# Patient Record
Sex: FEMALE | Race: WHITE | NOT HISPANIC OR LATINO | Employment: FULL TIME | URBAN - NONMETROPOLITAN AREA
[De-identification: names, ages, dates, MRNs, and addresses within clinical notes are randomized per-mention and may not be internally consistent; named-entity substitution may affect disease eponyms.]

---

## 2017-01-01 ENCOUNTER — HOSPITAL ENCOUNTER (OUTPATIENT)
Dept: URGENT CARE | Facility: CLINIC | Age: 57
Discharge: HOME | End: 2017-01-01
Attending: NURSE PRACTITIONER | Admitting: NURSE PRACTITIONER

## 2017-01-01 ENCOUNTER — HOSPITAL ENCOUNTER (OUTPATIENT)
Dept: URGENT CARE | Facility: CLINIC | Age: 57
Discharge: HOME | End: 2017-01-01
Attending: FAMILY MEDICINE | Admitting: FAMILY MEDICINE

## 2017-01-05 ENCOUNTER — OFFICE VISIT (OUTPATIENT)
Dept: FAMILY MEDICINE CLINIC | Facility: CLINIC | Age: 57
End: 2017-01-05

## 2017-01-05 VITALS
HEIGHT: 61 IN | SYSTOLIC BLOOD PRESSURE: 110 MMHG | WEIGHT: 224 LBS | HEART RATE: 57 BPM | DIASTOLIC BLOOD PRESSURE: 62 MMHG | OXYGEN SATURATION: 98 % | BODY MASS INDEX: 42.29 KG/M2

## 2017-01-05 DIAGNOSIS — Z98.84 H/O GASTRIC BYPASS: Primary | ICD-10-CM

## 2017-01-05 DIAGNOSIS — K59.00 CONSTIPATION, UNSPECIFIED CONSTIPATION TYPE: ICD-10-CM

## 2017-01-05 DIAGNOSIS — R10.84 GENERALIZED ABDOMINAL PAIN: ICD-10-CM

## 2017-01-05 LAB
25(OH)D2 SERPL-MCNC: 38 NG/ML (ref 30–100)
ALBUMIN SERPL-MCNC: 3.8 GM/DL (ref 3.4–4.8)
ALP SERPL-CCNC: 72 U/L (ref 38–126)
ALT SERPL-CCNC: 44 U/L (ref 9–52)
ANION GAP SERPL CALCULATED.3IONS-SCNC: 8 MMOL/L (ref 5–15)
AST SERPL-CCNC: 39 U/L (ref 14–36)
BASOPHILS # BLD AUTO: 0.02 X1000/UL (ref 0–0.2)
BASOPHILS NFR BLD AUTO: 0.3 % (ref 0–2)
BILIRUB SERPL-MCNC: 0.7 MG/DL (ref 0.2–1.3)
BUN SERPL-MCNC: 17 MG/DL (ref 7–21)
CALCIUM SERPL-MCNC: 9.4 MG/DL (ref 8.4–10.2)
CHLORIDE SERPL-SCNC: 104 MMOL/L (ref 95–110)
CO2 SERPL-SCNC: 29 MMOL/L (ref 22–31)
CONV AUTO IG#: 0.01 X1000/UL (ref 0.01–0.02)
CONV AUTO IG%: 0.1 % (ref 0–0.5)
CREAT SERPL-MCNC: 0.6 MG/DL (ref 0.5–1)
EOSINOPHIL # BLD AUTO: 0.17 X1000/UL (ref 0–0.7)
EOSINOPHIL NFR BLD AUTO: 2.4 % (ref 0–7)
ERYTHROCYTE [DISTWIDTH] IN BLOOD: 15 % (ref 11.5–14.5)
GLUCOSE SERPL-MCNC: 125 MG/DL (ref 60–100)
GRANULOCYTES # BLD AUTO: 4.09 X1000/UL (ref 2–8.6)
GRANULOCYTES NFR BLD AUTO: 58.3 % (ref 37–80)
HCT VFR BLD CALC: 43.4 % (ref 35–45)
HGB BLD-MCNC: 14.4 GM/DL (ref 12–15.5)
IRON SATN MFR SERPL: 16.5 % (ref 15–50)
IRON SERPL-MCNC: 49 UG/DL (ref 37–170)
LYMPHOCYTES # BLD AUTO: 2.29 X1000/UL (ref 0.6–4.2)
LYMPHOCYTES NFR BLD AUTO: 32.6 % (ref 10–50)
MCH RBC QN: 29.6 PG (ref 26–34)
MCHC RBC-ENTMCNC: 33.2 GM/DL (ref 31.4–36)
MCV RBC: 89.3 FL (ref 80–98)
MONOCYTES # BLD AUTO: 0.44 X1000/UL (ref 0–0.9)
MONOCYTES NFR BLD AUTO: 6.3 % (ref 0–12)
PLATELET # BLD: 264 X1000/MM3 (ref 150–450)
PMV BLD: 10.7 FL (ref 8–12)
POTASSIUM SERPL-SCNC: 3.9 MMOL/L (ref 3.5–5.1)
PROT SERPL-MCNC: 7.2 GM/DL (ref 6.3–8.6)
RBC # BLD: 4.86 MEGA/MM3 (ref 3.77–5.16)
SODIUM SERPL-SCNC: 141 MMOL/L (ref 137–145)
T4 FREE SERPL-MCNC: 0.93 NG/DL (ref 0.78–2.19)
TIBC SERPL-MCNC: 297 UG/DL (ref 265–497)
VIT B12 SERPL-MCNC: 668 PG/ML (ref 239–931)
WBC # BLD: 7 X1000/UL (ref 3.2–9.8)

## 2017-01-05 PROCEDURE — 99214 OFFICE O/P EST MOD 30 MIN: CPT | Performed by: FAMILY MEDICINE

## 2017-01-05 RX ORDER — SENNA AND DOCUSATE SODIUM 50; 8.6 MG/1; MG/1
2 TABLET, FILM COATED ORAL DAILY
Qty: 60 TABLET | Refills: 5 | Status: SHIPPED | OUTPATIENT
Start: 2017-01-05 | End: 2017-08-07

## 2017-01-05 NOTE — MR AVS SNAPSHOT
Kimberly Means   1/5/2017 10:00 AM   Office Visit    Dept Phone:  [phone]   Encounter #:  [id number]    Provider:  Jessi Iverson MD   Department:  [facility] FAMILY MEDICINE                Your Full Care Plan              Today's Medication Changes          These changes are accurate as of: 1/5/17  1:18 PM.  If you have any questions, ask your nurse or doctor.               New Medication(s)Ordered:     sennosides-docusate sodium 8.6-50 MG tablet   Commonly known as:  SENOKOT-S   Take 2 tablets by mouth Daily.   Started by:  Jessi Iverson MD            Where to Get Your Medications      These medications were sent to [facility] [address] [place] [address] AT [facility] - [phone]  - [phone] [facility] [address]     Phone:  [phone]     sennosides-docusate sodium 8.6-50 MG tablet                  Your Updated Medication List          This list is accurate as of: 1/5/17  1:18 PM.  Always use your most recent med list.                albuterol 108 (90 BASE) MCG/ACT inhaler   Commonly known as:  PROVENTIL HFA;VENTOLIN HFA       ALPRAZolam 0.5 MG tablet   Commonly known as:  XANAX       aspirin 81 MG tablet       butalbital-aspirin-caffeine -40 MG capsule   Commonly known as:  FIORINAL       clopidogrel 75 MG tablet   Commonly known as:  PLAVIX   Take 1 tablet by mouth Every Night.       cyanocobalamin 1000 MCG/ML injection       escitalopram 10 MG tablet   Commonly known as:  LEXAPRO       esomeprazole 40 MG capsule   Commonly known as:  NEXIUM   Take 1 capsule by mouth every morning before breakfast.       ezetimibe-simvastatin 10-40 MG per tablet   Commonly known as:  VYTORIN   Take 1 tablet by mouth Every Night.       fluticasone 50 MCG/ACT nasal spray   Commonly known as:  FLONASE       losartan 100 MG tablet   Commonly known as:  COZAAR       metoprolol succinate XL 50 MG 24 hr  tablet   Commonly known as:  TOPROL-XL   Take 1 tablet by mouth Daily.       promethazine 12.5 MG tablet   Commonly known as:  PHENERGAN       sennosides-docusate sodium 8.6-50 MG tablet   Commonly known as:  SENOKOT-S   Take 2 tablets by mouth Daily.       vitamin D 80718 UNITS capsule capsule   Commonly known as:  ERGOCALCIFEROL               We Performed the Following     CBC & Differential     Comprehensive Metabolic Panel     Iron Profile     T4, Free     TSH     Vitamin A     Vitamin B12     Vitamin D 25 Hydroxy     Vitamin E     Vitamin K1       You Were Diagnosed With        Codes Comments    H/O gastric bypass    -  Primary ICD-10-CM: Z98.890  ICD-9-CM: V45.86     Generalized abdominal pain     ICD-10-CM: R10.84  ICD-9-CM: 789.07     Constipation, unspecified constipation type     ICD-10-CM: K59.00  ICD-9-CM: 564.00       Instructions     None    Patient Instructions History      Upcoming Appointments     Visit Type Date Time Department    OFFICE VISIT 1/5/2017 10:00 AM [facility]    OFFICE VISIT 1/16/2017  9:45 AM [facility] F[facility]ULTY [facility]    OFFICE VISIT 4/27/2017  3:15 PM [facility] HEART CARE [facility]      MyChart Signup     Our records indicate that you have declined [facility] [employer] signup. If you would like to sign up for [employer], please email [email or web address] or call [phone] to obtain an activation code.             Other Info from Your Visit           Your Appointments     Jan 16, 2017  9:45 AM CST   Office Visit with Jessi Iverson MD   [facility] FAMILY MEDICINE (--)    [address]   [facility] [address] [phone]           Arrive 15 minutes prior to appointment.            Apr 27, 2017  3:15 PM CDT   Office Visit with Nallely Foster MD   [facility] CARDIOLOGY (--)    [address]  [facility] [address] [phone]           Arrive 15 minutes prior to appointment.              Allergies     Kiwi Extract   "Anaphylaxis    Dilaudid [Hydromorphone]  Other (See Comments)    Rapid heart rate    Latex      Lortab [Hydrocodone-acetaminophen]  Confusion, Hallucinations    Morphine And Related  Itching    Paxil [Paroxetine]      Sulfa Antibiotics  Hives      Reason for Visit     Weight Check           Vital Signs     Blood Pressure Pulse Height Weight Oxygen Saturation Body Mass Index    110/62 (BP Location: Right arm, Patient Position: Sitting, Cuff Size: Adult) 57 61\" (154.9 cm) 224 lb (102 kg) 98% 42.32 kg/m2    Smoking Status                   Never Smoker           Problems and Diagnoses Noted     Abdominal pain    History of gastric bypass    -  Primary    Constipation            "

## 2017-01-05 NOTE — PROGRESS NOTES
Subjective   Kimberly Means is a 56 y.o. female who presents to the clinic for an ER follow up. She was recently seen in the ER for abdominal pain.  She had a CT scan that was abnormal showing  DATE OF PROCEDURE- 1/2/2017 7-00 AM CST CT OF THE ABDOMEN AND PELVIS WITH IV CONTRAST INDICATION FOR PROCEDURE- 56 years -old patient presents for evaluation abdominal pain . TECHNIQUE- Contiguous axial images were obtained from lung bases to the proximal thighs after intravenous administration of 100 mm of Isovue-300 contrast. Oral contrast is not administered. Multiplanar reformations are submitted for interpretation. Dose length product is . Images were acquired in accordance with the principles of ALARA (as low as reasonably allowable). COMPARISON- CT the abdomen and pelvis dated May 3, 2016. FINDINGS- What is seen of the chest wall has a normal appearance. What is seen of the heart has a normal unenhanced appearance without obvious pericardial effusion. Lung bases are clear. No pleural effusions are visible. The liver has decreased attenuation without obvious mass or dilated intrahepatic biliary ducts. The gallbladder is not present with surgical clips in the gallbladder fossa probably secondary to cholecystectomy.. The spleen has a normal appearance with calcified granulomas. Both adrenal glands are within normal limits. The pancreas has a normal appearance. Both kidneys have a normal appearance without obvious perinephric fluid or hydronephrosis. There is no evidence for hydroureter or radiopaque ureteral calculi. The urinary bladder has a normal appearance. The uterus is not visualized suggesting patient may have had a hysterectomy. The distal esophagus has a normal appearance. Surgical sutures are visible in the stomach suggesting sequela of gastric bypass surgery. There is no obvious dilated bowel, ascites or pneumoperitoneum. The small bowel has a normal appearance. The descending colon is not distended which  gives appearance of mild thickening of the walls. There is also mild thickening of the walls of sigmoid colon.. There is a multifocal abnormal thickening of the walls of the ascending colon, new since the previous study. This could represent the presence of polyps and/or annular mass. Scattered colonic diverticula are visualized. No appendix is visualized. Abdominal aorta has a normal tapered appearance. The inferior vena cava has a normal appearance. Abdominal wall has moderately large ventral hernia containing bowel and fat in the central abdominal wall. There is multilevel thoracic spondylosis. Intervertebral disc spaces are within normal limits. Bony pelvis has a normal CT appearance. CONCLUSION- 1. No CT evidence of acute intra-abdominal disease. 2. Status post gastric surgery. 3. Cholecystectomy. 4. Nonspecific thickening of the walls of the distal descending colon and sigmoid colon. This maybe the result of acute or chronic inflammation. 5. Nonspecific abnormal thickening of the walls of the ascending colon. This maybe the result of a neoplastic process. 6. Large ventral hernia containing bowel and fat. 7. Hepatic steatosis.      She has been having problems with constipation.  She states Dr. Villegas did her colonoscopy a year ago.  Her weight is down 40 pounds after having gastric sleeve 11/8/2016.  She wasn't able to eat over the weekend due to abdominal pain.  She ate 1/2 cup of oats this am.  She is feeling better.  She needs labs due to her recent surgery.    History of Present Illness     The following portions of the patient's history were reviewed and updated as appropriate:   She  has a past medical history of Abdominal pain; Acute bronchitis; Acute left otitis media; Acute otitis externa; Allergic rhinitis; Allergy to shrimp; Ankle pain; Asthma; Astigmatism; Bacterial pneumonia (12/02/2015); Contact dermatitis; Coronary arteriosclerosis; Cough; Depressive disorder; Diarrhea; Dysuria; Encounter for  Papanicolaou smear of cervix (06/01/2009); Epigastric pain; Epistaxis; Essential hypertension; Fatigue; Fever; Functional diarrhea; Gastroenteritis; Generalized anxiety disorder (01/2015); Heel pain; Hematochezia; Hernia of abdominal wall (05/13/2016); History of bone density study (05/27/2014); History of colonoscopy; History of esophagogastroduodenoscopy; History of mammogram (06/29/2016); History of screening mammography; Hyperglycemia; Hyperlipidemia; Impaired glucose tolerance; Irritable bowel syndrome; Measles; Mechanical complication of internal orthopedic device; Migraine; Morbid obesity due to excess calories; Mumps; Myopia; Nausea; Need for DTaP vaccine (01/17/2015); Retinal tear; Spasm of back muscles; Sprain of ankle; Umbilical hernia; Upper respiratory infection; Urinary tract infectious disease; Ventral hernia; Vitamin D deficiency; Vitreous detachment; and Vitreous opacities.  She  does not have any pertinent problems on file.  She  has a past surgical history that includes Coronary angioplasty; Cholecystectomy (08/26/1993); Cervical Corpectomy; Epigastric Hernia Repair (02/02/1973); Tonsillectomy; Hysterectomy (09/05/1989); Vaginal delivery; and Gastric Sleeve (11/08/2016).  Her family history includes COPD in her father; Coronary artery disease in her father and other; Diabetes in her father and other; Diabetes type II in her mother; Hypertension in her other; Hypothyroidism in her mother; Long QT syndrome in her mother; Thyroid disease in her father and mother; Tremor in her father.  She  reports that she has never smoked. She has never used smokeless tobacco. She reports that she does not drink alcohol or use illicit drugs.  Current Outpatient Prescriptions   Medication Sig Dispense Refill   • albuterol (PROVENTIL HFA;VENTOLIN HFA) 108 (90 BASE) MCG/ACT inhaler Inhale 2 puffs every 4 (four) hours as needed for wheezing.     • ALPRAZolam (XANAX) 0.5 MG tablet Take 0.5 mg by mouth. 2-3 times daily  as needed for anxiety     • aspirin 81 MG tablet Take 81 mg by mouth every night.     • butalbital-aspirin-caffeine (FIORINAL) -40 MG capsule Take 1 capsule by mouth every 4 (four) hours as needed for headaches.     • clopidogrel (PLAVIX) 75 MG tablet Take 1 tablet by mouth Every Night. 30 tablet 6   • cyanocobalamin 1000 MCG/ML injection Inject  into the shoulder, thigh, or buttocks. Inject 1 ML intramuscularly once every month     • escitalopram (LEXAPRO) 10 MG tablet Take  by mouth.     • esomeprazole (NEXIUM) 40 MG capsule Take 1 capsule by mouth every morning before breakfast. 30 capsule 5   • ezetimibe-simvastatin (VYTORIN) 10-40 MG per tablet Take 1 tablet by mouth Every Night. 30 tablet 5   • fluticasone (FLONASE) 50 MCG/ACT nasal spray 2 sprays into each nostril daily. Administer 2 sprays in each nostril for each dose.     • losartan (COZAAR) 100 MG tablet TK 1 T PO QD  3   • metoprolol succinate XL (TOPROL-XL) 50 MG 24 hr tablet Take 1 tablet by mouth Daily. 90 tablet 3   • promethazine (PHENERGAN) 12.5 MG tablet Take  by mouth.     • vitamin D (ERGOCALCIFEROL) 59060 UNITS capsule capsule Take 50,000 Units by mouth. 1 capsule Monday and Friday     • sennosides-docusate sodium (SENOKOT-S) 8.6-50 MG tablet Take 2 tablets by mouth Daily. 60 tablet 5     No current facility-administered medications for this visit.      Current Outpatient Prescriptions on File Prior to Visit   Medication Sig   • albuterol (PROVENTIL HFA;VENTOLIN HFA) 108 (90 BASE) MCG/ACT inhaler Inhale 2 puffs every 4 (four) hours as needed for wheezing.   • ALPRAZolam (XANAX) 0.5 MG tablet Take 0.5 mg by mouth. 2-3 times daily as needed for anxiety   • aspirin 81 MG tablet Take 81 mg by mouth every night.   • butalbital-aspirin-caffeine (FIORINAL) -40 MG capsule Take 1 capsule by mouth every 4 (four) hours as needed for headaches.   • clopidogrel (PLAVIX) 75 MG tablet Take 1 tablet by mouth Every Night.   • cyanocobalamin 1000  MCG/ML injection Inject  into the shoulder, thigh, or buttocks. Inject 1 ML intramuscularly once every month   • escitalopram (LEXAPRO) 10 MG tablet Take  by mouth.   • esomeprazole (NEXIUM) 40 MG capsule Take 1 capsule by mouth every morning before breakfast.   • ezetimibe-simvastatin (VYTORIN) 10-40 MG per tablet Take 1 tablet by mouth Every Night.   • fluticasone (FLONASE) 50 MCG/ACT nasal spray 2 sprays into each nostril daily. Administer 2 sprays in each nostril for each dose.   • losartan (COZAAR) 100 MG tablet TK 1 T PO QD   • metoprolol succinate XL (TOPROL-XL) 50 MG 24 hr tablet Take 1 tablet by mouth Daily.   • promethazine (PHENERGAN) 12.5 MG tablet Take  by mouth.   • vitamin D (ERGOCALCIFEROL) 81879 UNITS capsule capsule Take 50,000 Units by mouth. 1 capsule Monday and Friday     No current facility-administered medications on file prior to visit.      She is allergic to kiwi extract; dilaudid [hydromorphone]; latex; lortab [hydrocodone-acetaminophen]; morphine and related; paxil [paroxetine]; and sulfa antibiotics..    Review of Systems   Constitutional: Positive for appetite change and fatigue. Negative for chills, fever and unexpected weight change.   HENT: Negative.    Respiratory: Negative.    Cardiovascular: Negative.    Gastrointestinal: Positive for abdominal distention, abdominal pain, constipation, diarrhea and nausea. Negative for anal bleeding, blood in stool and vomiting.   Genitourinary: Negative.    Musculoskeletal: Positive for arthralgias.   Skin: Negative.    Neurological: Negative.    Psychiatric/Behavioral: Negative.        Objective   Physical Exam   Constitutional: She is oriented to person, place, and time. She appears well-developed and well-nourished. No distress.   HENT:   Head: Normocephalic and atraumatic.   Right Ear: External ear normal.   Left Ear: External ear normal.   Nose: Nose normal.   Mouth/Throat: Oropharynx is clear and moist. No oropharyngeal exudate.   Eyes:  Conjunctivae and EOM are normal. Pupils are equal, round, and reactive to light. Right eye exhibits no discharge. Left eye exhibits no discharge. No scleral icterus.   Neck: Neck supple.   Cardiovascular: Normal rate, regular rhythm, normal heart sounds and intact distal pulses.  Exam reveals no gallop and no friction rub.    No murmur heard.  Pulmonary/Chest: Effort normal and breath sounds normal. No respiratory distress. She has no wheezes. She has no rales.   Abdominal: Soft. Bowel sounds are normal. She exhibits no distension and no mass. There is tenderness. There is no rebound and no guarding. A hernia is present.   Musculoskeletal: She exhibits no edema.   Lymphadenopathy:     She has no cervical adenopathy.   Neurological: She is alert and oriented to person, place, and time.   Skin: Skin is warm and dry. She is not diaphoretic.   Psychiatric: She has a normal mood and affect. Her behavior is normal. Judgment and thought content normal.   Nursing note and vitals reviewed.      Assessment/Plan   Problems Addressed this Visit        Nervous and Auditory    Abdominal pain    Relevant Orders    CBC & Differential (Completed)    Comprehensive Metabolic Panel (Completed)      Other Visit Diagnoses     H/O gastric bypass    -  Primary    Relevant Orders    Vitamin B12 (Completed)    Vitamin D 25 Hydroxy (Completed)    TSH    T4, Free (Completed)    Vitamin A    Vitamin E    Vitamin K1    Iron Profile (Completed)    Constipation, unspecified constipation type        Relevant Medications    sennosides-docusate sodium (SENOKOT-S) 8.6-50 MG tablet        Talk with Dr. Villegas regarding her CT results.  Senokot for constipation.  Labs and ER notes reviewed with patient.  Check labs due to bariatric surgery.    Risk score 3.

## 2017-01-09 LAB
A-TOCOPHEROL VIT E SERPL-MCNC: 11.7 MG/L (ref 5.3–16.8)
PHYTONADIONE SERPL-MCNC: 0.43 NG/ML (ref 0.13–1.88)
VIT A SERPL-MCNC: 41 UG/DL (ref 20–65)

## 2017-01-16 ENCOUNTER — OFFICE VISIT (OUTPATIENT)
Dept: FAMILY MEDICINE CLINIC | Facility: CLINIC | Age: 57
End: 2017-01-16

## 2017-01-16 VITALS
BODY MASS INDEX: 40.97 KG/M2 | OXYGEN SATURATION: 97 % | SYSTOLIC BLOOD PRESSURE: 118 MMHG | WEIGHT: 217 LBS | HEIGHT: 61 IN | DIASTOLIC BLOOD PRESSURE: 70 MMHG | HEART RATE: 56 BPM

## 2017-01-16 DIAGNOSIS — K63.9 COLON WALL THICKENING: Primary | ICD-10-CM

## 2017-01-16 PROCEDURE — 99213 OFFICE O/P EST LOW 20 MIN: CPT | Performed by: FAMILY MEDICINE

## 2017-01-16 NOTE — MR AVS SNAPSHOT
Kimberly Means   1/16/2017 9:45 AM   Office Visit    Dept Phone:  [phone]   Encounter #:  [id number]    Provider:  Jessi Iverson MD   Department:  [facility] FAMILY MEDICINE                Your Full Care Plan              Today's Medication Changes          These changes are accurate as of: 1/16/17  4:20 PM.  If you have any questions, ask your nurse or doctor.               New Medication(s)Ordered:     polyethylene glycol 236 G solution   Commonly known as:  GOLYTELY   Take 240 mL by mouth 1 (One) Time for 1 dose.   Started by:  Britta Bernstein MA            Where to Get Your Medications      These medications were sent to [facility] [address] [place] [address] [address] [facility] AT [facility] - [phone]  - [phone] [facility] [address]     Phone:  [phone]     polyethylene glycol 236 G solution                  Your Updated Medication List          This list is accurate as of: 1/16/17  4:20 PM.  Always use your most recent med list.                albuterol 108 (90 BASE) MCG/ACT inhaler   Commonly known as:  PROVENTIL HFA;VENTOLIN HFA       ALPRAZolam 0.5 MG tablet   Commonly known as:  XANAX       aspirin 81 MG tablet       butalbital-aspirin-caffeine -40 MG capsule   Commonly known as:  FIORINAL       clopidogrel 75 MG tablet   Commonly known as:  PLAVIX   Take 1 tablet by mouth Every Night.       cyanocobalamin 1000 MCG/ML injection       escitalopram 10 MG tablet   Commonly known as:  LEXAPRO       esomeprazole 40 MG capsule   Commonly known as:  NEXIUM   Take 1 capsule by mouth every morning before breakfast.       ezetimibe-simvastatin 10-40 MG per tablet   Commonly known as:  VYTORIN   Take 1 tablet by mouth Every Night.       fluticasone 50 MCG/ACT nasal spray   Commonly known as:  FLONASE       losartan 100 MG tablet   Commonly known as:  COZAAR       metoprolol succinate XL 50 MG 24 hr  tablet   Commonly known as:  TOPROL-XL   Take 1 tablet by mouth Daily.       polyethylene glycol 236 G solution   Commonly known as:  GOLYTELY   Take 240 mL by mouth 1 (One) Time for 1 dose.       promethazine 12.5 MG tablet   Commonly known as:  PHENERGAN       sennosides-docusate sodium 8.6-50 MG tablet   Commonly known as:  SENOKOT-S   Take 2 tablets by mouth Daily.       vitamin D 54187 UNITS capsule capsule   Commonly known as:  ERGOCALCIFEROL               We Performed the Following     Ambulatory Referral For Screening Colonoscopy       You Were Diagnosed With        Codes Comments    Colon wall thickening    -  Primary ICD-10-CM: K63.9  ICD-9-CM: 569.89       Instructions     None    Patient Instructions History      Upcoming Appointments     Visit Type Date Time Department    OFFICE VISIT 1/16/2017  9:45 AM [facility] FACULTY [facility]    OFFICE VISIT 4/27/2017  3:15 PM [facility] HEART CARE [facility]      MyChart Signup     Our records indicate that you have declined [facility] signup. If you would like to sign up for [employer] [facility], please email [email or web address] or call [phone] to obtain an activation code.             Other Info from Your Visit           Your Appointments     Apr 27, 2017  3:15 PM CDT   Office Visit with Nallely Foster MD   [facility] CARDIOLOGY (--)    [address] [address] [address]  [facility] [address] [phone]           Arrive 15 minutes prior to appointment.              Allergies     Kiwi Extract  Anaphylaxis    Dilaudid [Hydromorphone]  Other (See Comments)    Rapid heart rate    Latex      Lortab [Hydrocodone-acetaminophen]  Confusion, Hallucinations    Morphine And Related  Itching    Paxil [Paroxetine]      Sulfa Antibiotics  Hives      Reason for Visit     Weight Check           Vital Signs     Blood Pressure Pulse Height Weight Oxygen Saturation Body Mass Index    118/70 (BP Location: Right arm, Patient Position: Sitting, Cuff Size: Adult)  "56 61\" (154.9 cm) 217 lb (98.4 kg) 97% 41 kg/m2    Smoking Status                   Never Smoker           Problems and Diagnoses Noted     Colon wall thickening    -  Primary        "

## 2017-01-30 NOTE — PROGRESS NOTES
Subjective   Kimberly Means is a 56 y.o. female who presents to the clinic for a recheck of her stomach.  She is still having problems with her bowels.  She had an abnormal CT scan that showed thickening of her colon.  She is wanting her lab results.       History of Present Illness     The following portions of the patient's history were reviewed and updated as appropriate:   She  has a past medical history of Abdominal pain; Acute bronchitis; Acute left otitis media; Acute otitis externa; Allergic rhinitis; Allergy to shrimp; Ankle pain; Asthma; Astigmatism; Bacterial pneumonia (12/02/2015); Contact dermatitis; Coronary arteriosclerosis; Cough; Depressive disorder; Diarrhea; Dysuria; Encounter for Papanicolaou smear of cervix (06/01/2009); Epigastric pain; Epistaxis; Essential hypertension; Fatigue; Fever; Functional diarrhea; Gastroenteritis; Generalized anxiety disorder (01/2015); Heel pain; Hematochezia; Hernia of abdominal wall (05/13/2016); History of bone density study (05/27/2014); History of colonoscopy; History of esophagogastroduodenoscopy; History of mammogram (06/29/2016); History of screening mammography; Hyperglycemia; Hyperlipidemia; Impaired glucose tolerance; Irritable bowel syndrome; Measles; Mechanical complication of internal orthopedic device; Migraine; Morbid obesity due to excess calories; Mumps; Myopia; Nausea; Need for DTaP vaccine (01/17/2015); Retinal tear; Spasm of back muscles; Sprain of ankle; Umbilical hernia; Upper respiratory infection; Urinary tract infectious disease; Ventral hernia; Vitamin D deficiency; Vitreous detachment; and Vitreous opacities.  She  does not have any pertinent problems on file.  She  has a past surgical history that includes Coronary angioplasty; Cholecystectomy (08/26/1993); Cervical Corpectomy; Epigastric Hernia Repair (02/02/1973); Tonsillectomy; Hysterectomy (09/05/1989); Vaginal delivery; and Gastric Sleeve (11/08/2016).  Her family history includes  COPD in her father; Coronary artery disease in her father and other; Diabetes in her father and other; Diabetes type II in her mother; Hypertension in her other; Hypothyroidism in her mother; Long QT syndrome in her mother; Thyroid disease in her father and mother; Tremor in her father.  She  reports that she has never smoked. She has never used smokeless tobacco. She reports that she does not drink alcohol or use illicit drugs.  Current Outpatient Prescriptions   Medication Sig Dispense Refill   • albuterol (PROVENTIL HFA;VENTOLIN HFA) 108 (90 BASE) MCG/ACT inhaler Inhale 2 puffs every 4 (four) hours as needed for wheezing.     • ALPRAZolam (XANAX) 0.5 MG tablet Take 0.5 mg by mouth. 2-3 times daily as needed for anxiety     • aspirin 81 MG tablet Take 81 mg by mouth every night.     • butalbital-aspirin-caffeine (FIORINAL) -40 MG capsule Take 1 capsule by mouth every 4 (four) hours as needed for headaches.     • clopidogrel (PLAVIX) 75 MG tablet Take 1 tablet by mouth Every Night. 30 tablet 6   • cyanocobalamin 1000 MCG/ML injection Inject  into the shoulder, thigh, or buttocks. Inject 1 ML intramuscularly once every month     • escitalopram (LEXAPRO) 10 MG tablet Take  by mouth.     • esomeprazole (NEXIUM) 40 MG capsule Take 1 capsule by mouth every morning before breakfast. 30 capsule 5   • ezetimibe-simvastatin (VYTORIN) 10-40 MG per tablet Take 1 tablet by mouth Every Night. 30 tablet 5   • fluticasone (FLONASE) 50 MCG/ACT nasal spray 2 sprays into each nostril daily. Administer 2 sprays in each nostril for each dose.     • losartan (COZAAR) 100 MG tablet TK 1 T PO QD  3   • metoprolol succinate XL (TOPROL-XL) 50 MG 24 hr tablet Take 1 tablet by mouth Daily. 90 tablet 3   • promethazine (PHENERGAN) 12.5 MG tablet Take  by mouth.     • sennosides-docusate sodium (SENOKOT-S) 8.6-50 MG tablet Take 2 tablets by mouth Daily. 60 tablet 5   • vitamin D (ERGOCALCIFEROL) 90541 UNITS capsule capsule Take 50,000  Units by mouth. 1 capsule Monday and Friday       No current facility-administered medications for this visit.      Current Outpatient Prescriptions on File Prior to Visit   Medication Sig   • albuterol (PROVENTIL HFA;VENTOLIN HFA) 108 (90 BASE) MCG/ACT inhaler Inhale 2 puffs every 4 (four) hours as needed for wheezing.   • ALPRAZolam (XANAX) 0.5 MG tablet Take 0.5 mg by mouth. 2-3 times daily as needed for anxiety   • aspirin 81 MG tablet Take 81 mg by mouth every night.   • butalbital-aspirin-caffeine (FIORINAL) -40 MG capsule Take 1 capsule by mouth every 4 (four) hours as needed for headaches.   • clopidogrel (PLAVIX) 75 MG tablet Take 1 tablet by mouth Every Night.   • cyanocobalamin 1000 MCG/ML injection Inject  into the shoulder, thigh, or buttocks. Inject 1 ML intramuscularly once every month   • escitalopram (LEXAPRO) 10 MG tablet Take  by mouth.   • esomeprazole (NEXIUM) 40 MG capsule Take 1 capsule by mouth every morning before breakfast.   • ezetimibe-simvastatin (VYTORIN) 10-40 MG per tablet Take 1 tablet by mouth Every Night.   • fluticasone (FLONASE) 50 MCG/ACT nasal spray 2 sprays into each nostril daily. Administer 2 sprays in each nostril for each dose.   • losartan (COZAAR) 100 MG tablet TK 1 T PO QD   • metoprolol succinate XL (TOPROL-XL) 50 MG 24 hr tablet Take 1 tablet by mouth Daily.   • promethazine (PHENERGAN) 12.5 MG tablet Take  by mouth.   • sennosides-docusate sodium (SENOKOT-S) 8.6-50 MG tablet Take 2 tablets by mouth Daily.   • vitamin D (ERGOCALCIFEROL) 54544 UNITS capsule capsule Take 50,000 Units by mouth. 1 capsule Monday and Friday     No current facility-administered medications on file prior to visit.      She is allergic to kiwi extract; dilaudid [hydromorphone]; latex; lortab [hydrocodone-acetaminophen]; morphine and related; paxil [paroxetine]; and sulfa antibiotics..    Review of Systems   Constitutional: Positive for appetite change and fatigue. Negative for chills,  fever and unexpected weight change.   HENT: Negative.    Respiratory: Negative.    Cardiovascular: Negative.    Gastrointestinal: Positive for abdominal distention, abdominal pain, constipation, diarrhea and nausea. Negative for anal bleeding, blood in stool and vomiting.   Genitourinary: Negative.    Musculoskeletal: Positive for arthralgias.   Skin: Negative.    Neurological: Negative.    Psychiatric/Behavioral: Negative.        Objective   Physical Exam   Constitutional: She is oriented to person, place, and time. She appears well-developed and well-nourished. No distress.   HENT:   Head: Normocephalic and atraumatic.   Right Ear: External ear normal.   Left Ear: External ear normal.   Nose: Nose normal.   Mouth/Throat: Oropharynx is clear and moist. No oropharyngeal exudate.   Eyes: Conjunctivae and EOM are normal. Pupils are equal, round, and reactive to light. Right eye exhibits no discharge. Left eye exhibits no discharge. No scleral icterus.   Neck: Neck supple.   Cardiovascular: Normal rate, regular rhythm, normal heart sounds and intact distal pulses.  Exam reveals no gallop and no friction rub.    No murmur heard.  Pulmonary/Chest: Effort normal and breath sounds normal. No respiratory distress. She has no wheezes. She has no rales.   Abdominal: Soft. Bowel sounds are normal. She exhibits no distension and no mass. There is tenderness. There is no rebound and no guarding. A hernia is present.   Musculoskeletal: She exhibits no edema.   Lymphadenopathy:     She has no cervical adenopathy.   Neurological: She is alert and oriented to person, place, and time.   Skin: Skin is warm and dry. She is not diaphoretic.   Psychiatric: She has a normal mood and affect. Her behavior is normal. Judgment and thought content normal.   Nursing note and vitals reviewed.      Assessment/Plan   Problems Addressed this Visit     None      Visit Diagnoses     Colon wall thickening    -  Primary    Relevant Orders    Ambulatory  Referral For Screening Colonoscopy        Refer to GI for the colon wall thickening on CT scan.  Labs reviewed with patient.    Risk score 4.

## 2017-02-08 RX ORDER — SODIUM, POTASSIUM,MAG SULFATES 17.5-3.13G
SOLUTION, RECONSTITUTED, ORAL ORAL
Qty: 2 BOTTLE | Refills: 0 | Status: ON HOLD | OUTPATIENT
Start: 2017-02-08 | End: 2017-02-17

## 2017-02-15 RX ORDER — CYANOCOBALAMIN 1000 UG/ML
INJECTION, SOLUTION INTRAMUSCULAR; SUBCUTANEOUS
Qty: 1 ML | Refills: 0 | Status: SHIPPED | OUTPATIENT
Start: 2017-02-15 | End: 2017-03-28 | Stop reason: SDUPTHER

## 2017-02-16 ENCOUNTER — OFFICE VISIT (OUTPATIENT)
Dept: OPHTHALMOLOGY | Facility: CLINIC | Age: 57
End: 2017-02-16

## 2017-02-16 DIAGNOSIS — H52.203 ASTIGMATISM, BILATERAL: ICD-10-CM

## 2017-02-16 DIAGNOSIS — H33.312 RETINAL TEAR, LEFT: Primary | ICD-10-CM

## 2017-02-16 DIAGNOSIS — H52.13 MYOPIA, BILATERAL: ICD-10-CM

## 2017-02-16 PROCEDURE — 92014 COMPRE OPH EXAM EST PT 1/>: CPT | Performed by: OPHTHALMOLOGY

## 2017-02-16 NOTE — PROGRESS NOTES
Subjective   Kimberly Means is a 56 y.o. female.   Chief Complaint   Patient presents with   • Eye Exam     HPI     BV, wears CL; retina tear OS, laser       Last edited by Obi Blue MD on 2/16/2017  3:12 PM.       Review of Systems    Objective   Visual Acuity (Snellen - Linear)      Right Left   Dist cc 20/40 -1 20/50 +2   Near cc J10 J16       Correction:  Contacts         Manifest Refraction      Sphere Cylinder Axis Dist   Right -3.50 +0.75 170 20/25   Left -3.25 +0.50 180 20/25            Final Rx      Sphere Cylinder Axis Add   Right -3.50 +0.75 170 +2.50   Left -3.25 +0.75 180 +2.50             Not recorded               Main Ophthalmology Exam     External Exam      Right Left    External Normal Normal      Slit Lamp Exam      Right Left    Lids/Lashes Normal Normal    Conjunctiva/Sclera White and quiet White and quiet    Cornea Clear SCL Clear SCL    Anterior Chamber Deep and quiet Deep and quiet    Iris Round and reactive Round and reactive    Lens Clear Clear    Vitreous Normal Normal      Fundus Exam      Right Left    Disc Normal Normal    Macula Normal Normal    Vessels Normal Normal    Periphery Normal CR scar sup nasal                Assessment/Plan   Diagnoses and all orders for this visit:    Retinal tear, left  Comments:  s/p retinopexy, doing well    Myopia, bilateral    Astigmatism, bilateral    Glasses Rx given per refraction  Contact Lens Rx as above         No Follow-up on file.

## 2017-02-16 NOTE — PROGRESS NOTES
Subjective   Kimberly Means is a 56 y.o. female.   Chief Complaint   Patient presents with   • Eye Exam     HPI     BV, wears CL; retina tear OS, laser       Last edited by Obi lBue MD on 2/16/2017  3:12 PM.       Review of Systems    Objective   Visual Acuity (Snellen - Linear)      Right Left   Dist cc 20/40 -1 20/50 +2   Near cc J10 J16       Correction:  Contacts         Manifest Refraction      Sphere Cylinder Axis Dist   Right -3.50 +0.75 170 20/25   Left -3.25 +0.50 180 20/25            Final Rx      Sphere Cylinder Axis Add   Right -3.50 +0.75 170 +2.50   Left -3.25 +0.75 180 +2.50             Not recorded               Main Ophthalmology Exam     External Exam      Right Left    External Normal Normal      Slit Lamp Exam      Right Left    Lids/Lashes Normal Normal    Conjunctiva/Sclera White and quiet White and quiet    Cornea Clear SCL Clear SCL    Anterior Chamber Deep and quiet Deep and quiet    Iris Round and reactive Round and reactive    Lens Clear Clear    Vitreous Normal Normal      Fundus Exam      Right Left    Disc Normal Normal    Macula Normal Normal    Vessels Normal Normal    Periphery Normal CR scar sup nasal                Assessment/Plan   Diagnoses and all orders for this visit:    Retinal tear, left  Comments:  s/p retinopexy, doing well    Myopia, bilateral    Astigmatism, bilateral    Glasses Rx given per refraction  Contact Lens Rx as above         Return in about 1 year (around 2/16/2018), or if symptoms worsen or fail to improve.

## 2017-02-17 ENCOUNTER — HOSPITAL ENCOUNTER (OUTPATIENT)
Facility: HOSPITAL | Age: 57
Setting detail: HOSPITAL OUTPATIENT SURGERY
Discharge: HOME | End: 2017-02-17
Attending: INTERNAL MEDICINE | Admitting: INTERNAL MEDICINE

## 2017-02-17 ENCOUNTER — ANESTHESIA EVENT (OUTPATIENT)
Dept: GASTROENTEROLOGY | Facility: HOSPITAL | Age: 57
End: 2017-02-17

## 2017-02-17 ENCOUNTER — ANESTHESIA (OUTPATIENT)
Dept: GASTROENTEROLOGY | Facility: HOSPITAL | Age: 57
End: 2017-02-17

## 2017-02-17 VITALS
WEIGHT: 204.15 LBS | HEART RATE: 75 BPM | RESPIRATION RATE: 20 BRPM | TEMPERATURE: 97 F | DIASTOLIC BLOOD PRESSURE: 65 MMHG | HEIGHT: 61 IN | BODY MASS INDEX: 38.54 KG/M2 | OXYGEN SATURATION: 96 % | SYSTOLIC BLOOD PRESSURE: 128 MMHG

## 2017-02-17 DIAGNOSIS — K63.9 DISEASE OF INTESTINE: ICD-10-CM

## 2017-02-17 PROCEDURE — 45380 COLONOSCOPY AND BIOPSY: CPT | Performed by: INTERNAL MEDICINE

## 2017-02-17 PROCEDURE — 25010000002 FENTANYL CITRATE (PF) 100 MCG/2ML SOLUTION: Performed by: NURSE ANESTHETIST, CERTIFIED REGISTERED

## 2017-02-17 PROCEDURE — 88305 TISSUE EXAM BY PATHOLOGIST: CPT | Performed by: PATHOLOGY

## 2017-02-17 PROCEDURE — 25010000002 MIDAZOLAM PER 1 MG: Performed by: NURSE ANESTHETIST, CERTIFIED REGISTERED

## 2017-02-17 PROCEDURE — 88305 TISSUE EXAM BY PATHOLOGIST: CPT | Performed by: INTERNAL MEDICINE

## 2017-02-17 PROCEDURE — 25010000002 PROPOFOL 10 MG/ML EMULSION: Performed by: NURSE ANESTHETIST, CERTIFIED REGISTERED

## 2017-02-17 RX ORDER — DEXTROSE AND SODIUM CHLORIDE 5; .45 G/100ML; G/100ML
20 INJECTION, SOLUTION INTRAVENOUS CONTINUOUS
Status: DISCONTINUED | OUTPATIENT
Start: 2017-02-17 | End: 2017-02-17 | Stop reason: HOSPADM

## 2017-02-17 RX ORDER — PROPOFOL 10 MG/ML
VIAL (ML) INTRAVENOUS AS NEEDED
Status: DISCONTINUED | OUTPATIENT
Start: 2017-02-17 | End: 2017-02-17 | Stop reason: SURG

## 2017-02-17 RX ORDER — MIDAZOLAM HYDROCHLORIDE 1 MG/ML
INJECTION INTRAMUSCULAR; INTRAVENOUS AS NEEDED
Status: DISCONTINUED | OUTPATIENT
Start: 2017-02-17 | End: 2017-02-17 | Stop reason: SURG

## 2017-02-17 RX ORDER — FENTANYL CITRATE 50 UG/ML
INJECTION, SOLUTION INTRAMUSCULAR; INTRAVENOUS AS NEEDED
Status: DISCONTINUED | OUTPATIENT
Start: 2017-02-17 | End: 2017-02-17 | Stop reason: SURG

## 2017-02-17 RX ADMIN — FENTANYL CITRATE 50 MCG: 50 INJECTION, SOLUTION INTRAMUSCULAR; INTRAVENOUS at 10:31

## 2017-02-17 RX ADMIN — FENTANYL CITRATE 50 MCG: 50 INJECTION, SOLUTION INTRAMUSCULAR; INTRAVENOUS at 10:30

## 2017-02-17 RX ADMIN — MIDAZOLAM 1 MG: 1 INJECTION INTRAMUSCULAR; INTRAVENOUS at 10:30

## 2017-02-17 RX ADMIN — DEXTROSE AND SODIUM CHLORIDE 20 ML/HR: 5; 450 INJECTION, SOLUTION INTRAVENOUS at 10:16

## 2017-02-17 RX ADMIN — PROPOFOL 130 MG: 10 INJECTION, EMULSION INTRAVENOUS at 10:30

## 2017-02-17 RX ADMIN — MIDAZOLAM 1 MG: 1 INJECTION INTRAMUSCULAR; INTRAVENOUS at 10:31

## 2017-02-17 NOTE — PLAN OF CARE
Problem: GI Endoscopy (Adult)  Goal: Signs and Symptoms of Listed Potential Problems Will be Absent or Manageable (GI Endoscopy)    02/17/17 1048   GI Endoscopy   Problems Assessed (GI Endoscopy) all   Problems Present (GI Endoscopy) none

## 2017-02-17 NOTE — ADDENDUM NOTE
Addendum  created 02/17/17 1152 by Skip Crabtree, CRNA    Anesthesia Event edited, Anesthesia Intra Meds edited, Procedure Event Log accessed

## 2017-02-17 NOTE — H&P
[facility]      Chief Complaint:   No chief complaint on file.      Subjective     HPI:   Patient found on CT scan to have a thickening of the colon most likely secondary to inflammatory changes but possibly secondary to neoplastic changes.  Patient will need colonoscopy to evaluate this area with biopsies in the area of the sigmoid and descending colon.    Plan while patient follow up with GI following this procedure.    Past Medical History:   Past Medical History   Diagnosis Date   • Abdominal pain    • Acute bronchitis    • Acute left otitis media    • Acute otitis externa    • Allergic rhinitis      Due to pollen   • Ankle pain    • Asthma    • Astigmatism    • Bacterial pneumonia 12/02/2015   • Contact dermatitis    • Coronary arteriosclerosis      stent to LAD 2006      • Cough    • Depressive disorder    • Diarrhea    • Dysuria    • Encounter for Papanicolaou smear of cervix 06/01/2009   • Epigastric pain    • Epistaxis    • Essential hypertension    • Fatigue    • Fever    • Functional diarrhea    • Gastroenteritis    • Generalized anxiety disorder 01/2015     given samples of lexapro.   • Heel pain    • Hematochezia    • Hernia of abdominal wall 05/13/2016     incarcerated- primarily repaired. Now recurrent      • History of bone density study 05/27/2014   • History of colonoscopy      diagnostic (screening) 02271- Normal colonoscopy.;  Last Performed: 12/10/2015   • History of esophagogastroduodenoscopy      epigastric pain, gastritis without bleeding,gastroesophageal reflux without esophagitis;  Last Performed: 01/06/1999   • History of mammogram 06/29/2016   • History of screening mammography    • Hyperglycemia    • Hyperlipidemia    • Impaired glucose tolerance    • Irritable bowel syndrome    • Measles    • Mechanical complication of internal orthopedic device    • Migraine    • Morbid obesity due to excess calories    • Mumps    • Myopia    • Nausea    • Need for DTaP vaccine  01/17/2015   • Retinal tear    • Spasm of back muscles    • Sprain of ankle    • Umbilical hernia    • Upper respiratory infection    • Urinary tract infectious disease    • Ventral hernia      Open ventral hernioplasty. Incarcerated ventral hernia;  Last Performed: 07/17/2012   • Vitamin D deficiency    • Vitreous detachment    • Vitreous opacities        Family History:  Family History   Problem Relation Age of Onset   • Thyroid disease Mother    • Diabetes type II Mother    • Long QT syndrome Mother    • Hypothyroidism Mother    • Thyroid disease Father    • Coronary artery disease Father    • Tremor Father    • Diabetes Father    • COPD Father    • Coronary artery disease Other    • Diabetes Other    • Hypertension Other        Social History:   reports that she has never smoked. She has never used smokeless tobacco. She reports that she does not drink alcohol or use illicit drugs.    Medications:   Current Facility-Administered Medications   Medication Dose Route Frequency Provider Last Rate Last Dose   • dextrose 5 % and sodium chloride 0.45 % infusion  20 mL/hr Intravenous Continuous Young Villegas MD 20 mL/hr at 02/17/17 1016 20 mL/hr at 02/17/17 1016       Allergies:  Kiwi extract; Dilaudid [hydromorphone]; Latex; Lortab [hydrocodone-acetaminophen]; Morphine and related; Paxil [paroxetine]; and Sulfa antibiotics    ROS:    Review of Systems   HENT: Negative for congestion, sore throat and trouble swallowing.    Respiratory: Negative for apnea, cough, choking, chest tightness, shortness of breath, wheezing and stridor.    Cardiovascular: Negative for chest pain, palpitations and leg swelling.   Gastrointestinal: Negative for abdominal distention, abdominal pain, anal bleeding, blood in stool, constipation, diarrhea, nausea, rectal pain and vomiting.   Skin: Negative for color change, pallor, rash and wound.   Neurological: Negative for dizziness, syncope, weakness and headaches.   Psychiatric/Behavioral:  "Negative for agitation, behavioral problems, confusion and decreased concentration.     Objective     Blood pressure 130/79, pulse 79, temperature 97.5 °F (36.4 °C), resp. rate 18, height 61\" (154.9 cm), weight 204 lb 2.3 oz (92.6 kg), SpO2 97 %.    Physical Exam   Constitutional: She is oriented to person, place, and time. She appears well-developed and well-nourished. No distress.   HENT:   Head: Normocephalic and atraumatic.   Cardiovascular: Normal rate, regular rhythm, normal heart sounds and intact distal pulses.  Exam reveals no gallop and no friction rub.    No murmur heard.  Pulmonary/Chest: Breath sounds normal. No respiratory distress. She has no wheezes. She has no rales. She exhibits no tenderness.   Abdominal: Soft. Bowel sounds are normal. She exhibits no distension and no mass. There is no tenderness. There is no rebound and no guarding. No hernia.   Musculoskeletal: Normal range of motion. She exhibits no edema.   Neurological: She is alert and oriented to person, place, and time.   Skin: Skin is warm and dry. No rash noted. She is not diaphoretic. No erythema. No pallor.   Psychiatric: She has a normal mood and affect. Her behavior is normal. Judgment and thought content normal.        Assessment/Plan   [unfilled]    COLONOSCOPY (N/A)    [unfilled]    Anticipated Surgical Procedure:  Orders Placed This Encounter   Procedures   • Insert Peripheral IV     Standing Status:   Standing     Number of Occurrences:   1       The risks, benefits, and alternatives of this procedure have been discussed with the patient or the responsible party- the patient understands and agrees to proceed.                                                                "

## 2017-02-17 NOTE — PLAN OF CARE
Problem: Patient Care Overview (Adult)  Goal: Plan of Care Review    02/17/17 1044   Coping/Psychosocial Response Interventions   Plan Of Care Reviewed With patient   Patient Care Overview   Progress no change   Outcome Evaluation   Outcome Summary/Follow up Plan vtials stable, pt responds to verbal stimuli         Problem: GI Endoscopy (Adult)  Goal: Signs and Symptoms of Listed Potential Problems Will be Absent or Manageable (GI Endoscopy)    02/17/17 1044   GI Endoscopy   Problems Assessed (GI Endoscopy) all   Problems Present (GI Endoscopy) none

## 2017-02-17 NOTE — PLAN OF CARE
Problem: Patient Care Overview (Adult)  Goal: Plan of Care Review    02/17/17 1049   Coping/Psychosocial Response Interventions   Plan Of Care Reviewed With patient   Patient Care Overview   Progress no change

## 2017-02-20 LAB
LAB AP CASE REPORT: NORMAL
Lab: NORMAL
PATH REPORT.FINAL DX SPEC: NORMAL
PATH REPORT.GROSS SPEC: NORMAL

## 2017-03-29 RX ORDER — CYANOCOBALAMIN 1000 UG/ML
INJECTION, SOLUTION INTRAMUSCULAR; SUBCUTANEOUS
Qty: 1 ML | Refills: 0 | Status: SHIPPED | OUTPATIENT
Start: 2017-03-29 | End: 2017-04-04 | Stop reason: SDUPTHER

## 2017-04-04 RX ORDER — CYANOCOBALAMIN 1000 UG/ML
1000 INJECTION, SOLUTION INTRAMUSCULAR; SUBCUTANEOUS
Qty: 1 ML | Refills: 5 | Status: SHIPPED | OUTPATIENT
Start: 2017-04-04 | End: 2017-05-17 | Stop reason: SDUPTHER

## 2017-04-07 RX ORDER — CLOPIDOGREL BISULFATE 75 MG/1
TABLET ORAL
Qty: 30 TABLET | Refills: 6 | Status: SHIPPED | OUTPATIENT
Start: 2017-04-07 | End: 2017-12-21 | Stop reason: SDUPTHER

## 2017-04-11 ENCOUNTER — APPOINTMENT (OUTPATIENT)
Dept: LAB | Facility: HOSPITAL | Age: 57
End: 2017-04-11

## 2017-04-11 PROCEDURE — 82150 ASSAY OF AMYLASE: CPT | Performed by: FAMILY MEDICINE

## 2017-04-11 PROCEDURE — 80053 COMPREHEN METABOLIC PANEL: CPT | Performed by: FAMILY MEDICINE

## 2017-04-11 PROCEDURE — 85025 COMPLETE CBC W/AUTO DIFF WBC: CPT | Performed by: FAMILY MEDICINE

## 2017-05-17 RX ORDER — ERGOCALCIFEROL 1.25 MG/1
CAPSULE ORAL
Qty: 30 CAPSULE | Refills: 2 | Status: SHIPPED | OUTPATIENT
Start: 2017-05-17 | End: 2017-12-21 | Stop reason: SDUPTHER

## 2017-05-17 RX ORDER — CYANOCOBALAMIN 1000 UG/ML
1000 INJECTION, SOLUTION INTRAMUSCULAR; SUBCUTANEOUS
Qty: 1 ML | Refills: 5 | Status: SHIPPED | OUTPATIENT
Start: 2017-05-17 | End: 2017-12-21 | Stop reason: SDUPTHER

## 2017-06-13 DIAGNOSIS — E78.5 HYPERLIPIDEMIA, UNSPECIFIED HYPERLIPIDEMIA TYPE: Primary | ICD-10-CM

## 2017-06-15 ENCOUNTER — LAB (OUTPATIENT)
Dept: LAB | Facility: HOSPITAL | Age: 57
End: 2017-06-15

## 2017-06-15 DIAGNOSIS — E78.5 HYPERLIPIDEMIA, UNSPECIFIED HYPERLIPIDEMIA TYPE: ICD-10-CM

## 2017-06-15 LAB
ARTICHOKE IGE QN: 116 MG/DL (ref 1–129)
CHOLEST SERPL-MCNC: 191 MG/DL (ref 0–199)
HDLC SERPL-MCNC: 44 MG/DL (ref 60–200)
LDLC/HDLC SERPL: 2.66 {RATIO} (ref 0–3.22)
TRIGL SERPL-MCNC: 150 MG/DL (ref 20–199)

## 2017-06-15 PROCEDURE — 80061 LIPID PANEL: CPT | Performed by: INTERNAL MEDICINE

## 2017-06-15 PROCEDURE — 36415 COLL VENOUS BLD VENIPUNCTURE: CPT

## 2017-06-16 ENCOUNTER — OFFICE VISIT (OUTPATIENT)
Dept: CARDIOLOGY | Facility: CLINIC | Age: 57
End: 2017-06-16

## 2017-06-16 VITALS
WEIGHT: 184 LBS | HEART RATE: 74 BPM | DIASTOLIC BLOOD PRESSURE: 70 MMHG | BODY MASS INDEX: 34.74 KG/M2 | HEIGHT: 61 IN | SYSTOLIC BLOOD PRESSURE: 122 MMHG

## 2017-06-16 DIAGNOSIS — E66.01 MORBID OBESITY DUE TO EXCESS CALORIES (HCC): ICD-10-CM

## 2017-06-16 DIAGNOSIS — E78.2 MIXED HYPERLIPIDEMIA: Primary | ICD-10-CM

## 2017-06-16 DIAGNOSIS — I25.10 CORONARY ARTERIOSCLEROSIS: ICD-10-CM

## 2017-06-16 DIAGNOSIS — I10 ESSENTIAL HYPERTENSION: ICD-10-CM

## 2017-06-16 PROCEDURE — 99213 OFFICE O/P EST LOW 20 MIN: CPT | Performed by: INTERNAL MEDICINE

## 2017-06-16 NOTE — PROGRESS NOTES
Kimberly Means  56 y.o. female    06/16/2017  1. Mixed hyperlipidemia    2. Essential hypertension    3. Coronary arteriosclerosis    4. Morbid obesity due to excess calories        History of Present Illness    Mrs. Means is here for follow-up of her above stated problems.  She has done remarkably well following her gastric sleeve surgery and has lost about 80 pounds in weight.  Blood pressure was in the normal range.  No chest pain, shortness of breath or palpitation was reported and clinical exam did not show any signs of angina or congestive heart failure.  She has been exercising on a regular basis and watching her diet.        SUBJECTIVE    Allergies   Allergen Reactions   • Kiwi Extract Anaphylaxis   • Dilaudid [Hydromorphone] Other (See Comments)     Rapid heart rate   • Latex    • Lortab [Hydrocodone-Acetaminophen] Confusion and Hallucinations   • Morphine And Related Itching   • Paxil [Paroxetine]    • Sulfa Antibiotics Hives         Past Medical History:   Diagnosis Date   • Abdominal pain    • Acute bronchitis    • Acute left otitis media    • Acute otitis externa    • Allergic rhinitis     Due to pollen   • Ankle pain    • Asthma    • Astigmatism    • Bacterial pneumonia 12/02/2015   • Contact dermatitis    • Coronary arteriosclerosis     stent to LAD 2006      • Cough    • Depressive disorder    • Diarrhea    • Dysuria    • Encounter for Papanicolaou smear of cervix 06/01/2009   • Epigastric pain    • Epistaxis    • Essential hypertension    • Fatigue    • Fever    • Functional diarrhea    • Gastroenteritis    • Generalized anxiety disorder 01/2015    given samples of lexapro.   • Heel pain    • Hematochezia    • Hernia of abdominal wall 05/13/2016    incarcerated- primarily repaired. Now recurrent      • History of bone density study 05/27/2014   • History of colonoscopy     diagnostic (screening) 01944- Normal colonoscopy.;  Last Performed: 12/10/2015   • History of esophagogastroduodenoscopy      epigastric pain, gastritis without bleeding,gastroesophageal reflux without esophagitis;  Last Performed: 01/06/1999   • History of mammogram 06/29/2016   • History of screening mammography    • Hyperglycemia    • Hyperlipidemia    • Impaired glucose tolerance    • Irritable bowel syndrome    • Measles    • Mechanical complication of internal orthopedic device    • Migraine    • Morbid obesity due to excess calories    • Mumps    • Myopia    • Nausea    • Need for DTaP vaccine 01/17/2015   • Retinal tear    • Spasm of back muscles    • Sprain of ankle    • Umbilical hernia    • Upper respiratory infection    • Urinary tract infectious disease    • Ventral hernia     Open ventral hernioplasty. Incarcerated ventral hernia;  Last Performed: 07/17/2012   • Vitamin D deficiency    • Vitreous detachment    • Vitreous opacities          Past Surgical History:   Procedure Laterality Date   • CERVICAL CONIZATION      stress urinary incontinence,endocervical dysplasia;  Last Performed: 09/05/1989   • CHOLECYSTECTOMY  08/26/1993    Laparoscopic   • COLONOSCOPY N/A 2/17/2017    Procedure: COLONOSCOPY;  Surgeon: Young Villegas MD;  Location: [facility] ENDOSCOPY;  Service:    • CORONARY ANGIOPLASTY      stent to LAD;  Last Performed: 2006   • EPIGASTRIC HERNIA REPAIR  02/02/1973   • GASTRIC SLEEVE LAPAROSCOPIC  11/08/2016   • HYSTERECTOMY  09/05/1989    mmk urethropexy   • TONSILLECTOMY      postoperative tonsillectomy,transfixed bleeding vessel, right tonsillar fossa;  Last Performed: 01/05/1974   • VAGINAL DELIVERY      x 2         Family History   Problem Relation Age of Onset   • Thyroid disease Mother    • Diabetes type II Mother    • Long QT syndrome Mother    • Hypothyroidism Mother    • Thyroid disease Father    • Coronary artery disease Father    • Tremor Father    • Diabetes Father    • COPD Father    • Coronary artery disease Other    • Diabetes Other    • Hypertension Other          Social History     Social History   •  Marital status:      Spouse name: Milla   • Number of children: 2   • Years of education: N/A     Occupational History   •  [employer]     Social History Main Topics   • Smoking status: Never Smoker   • Smokeless tobacco: Never Used   • Alcohol use No   • Drug use: No   • Sexual activity: Yes     Partners: Male     Birth control/ protection: Post-menopausal     Other Topics Concern   • Not on file     Social History Narrative         Current Outpatient Prescriptions   Medication Sig Dispense Refill   • albuterol (PROVENTIL HFA;VENTOLIN HFA) 108 (90 BASE) MCG/ACT inhaler Inhale 2 puffs every 4 (four) hours as needed for wheezing.     • ALPRAZolam (XANAX) 0.5 MG tablet Take 0.5 mg by mouth. 2-3 times daily as needed for anxiety     • aspirin 81 MG tablet Take 81 mg by mouth every night.     • butalbital-aspirin-caffeine (FIORINAL) -40 MG capsule Take 1 capsule by mouth every 4 (four) hours as needed for headaches.     • clopidogrel (PLAVIX) 75 MG tablet TAKE 1 TABLET BY MOUTH EVERY NIGHT. 30 tablet 6   • cyanocobalamin 1000 MCG/ML injection Inject 1 mL under the skin Every 28 (Twenty-Eight) Days. 1 mL 5   • escitalopram (LEXAPRO) 10 MG tablet Take 10 mg by mouth Daily.     • esomeprazole (NEXIUM) 40 MG capsule Take 1 capsule by mouth every morning before breakfast. 30 capsule 5   • ezetimibe-simvastatin (VYTORIN) 10-40 MG per tablet Take 1 tablet by mouth Every Night. 30 tablet 5   • fluticasone (FLONASE) 50 MCG/ACT nasal spray 2 sprays into each nostril daily. Administer 2 sprays in each nostril for each dose.     • metoprolol succinate XL (TOPROL-XL) 50 MG 24 hr tablet Take 1 tablet by mouth Daily. 90 tablet 3   • sennosides-docusate sodium (SENOKOT-S) 8.6-50 MG tablet Take 2 tablets by mouth Daily. 60 tablet 5   • vitamin D (ERGOCALCIFEROL) 65748 UNITS capsule capsule 1 capsule Monday and Friday 30 capsule 2     No current facility-administered medications for this visit.   "        OBJECTIVE    /70  Pulse 74  Ht 61\" (154.9 cm)  Wt 184 lb (83.5 kg)  BMI 34.77 kg/m2        Review of Systems     Constitutional:   weight loss following gastric sleeve surgery,  fever or chills, no change in exercise tolerance     HENT:  Denies any hearing loss, epistaxis, hoarseness, or difficulty speaking.     Eyes: Wears eyeglasses or contact lenses     Respiratory:  Denies dyspnea with exertion,no cough, wheezing, or hemoptysis.     Cardiovascular: Negative for palpations, chest pain, orthopnea, PND, peripheral edema, syncope, or claudication.     Gastrointestinal:  Denies change in bowel habits, dyspepsia, ulcer disease, hematochezia, or melena.     Endocrine: Negative for cold intolerance, heat intolerance, polydipsia, polyphagia and polyuria. Denies any history of weight change, heat/cold intolerance, polydipsia, polyuria     Genitourinary: Negative.      Musculoskeletal: Denies any history of arthritic symptoms or back problems     Skin:  Denies any change in hair or nails, rashes, or skin lesions.     Allergic/Immunologic: Negative.  Negative for environmental allergies, food allergies and immunocompromised state.     Neurological:  Denies any history of recurrent headaches, strokes, TIA, or seizure disorder.     Hematological: Denies any food allergies, seasonal allergies, bleeding disorders, or lymphadenopathy.     Psychiatric/Behavioral: Denies any history of depression, substance abuse, or change in cognitive function.         Physical Exam     Constitutional: Cooperative, alert and oriented,  in no acute distress.     HENT:   Head: Normocephalic, normal hair patterns, no masses or tenderness.  Ears, Nose, and Throat: No gross abnormalities. No pallor or cyanosis.   Eyes: EOMS intact, PERRL, conjunctivae and lids unremarkable. Fundoscopic exam and visual fields not performed.   Neck: No palpable masses or adenopathy, no thyromegaly, no JVD, carotid pulses are full and equal bilaterally " and without  Bruits.     Cardiovascular: Regular rhythm, S1 and S2 normal, no S3 or S4.  No murmurs, gallops, or rubs detected.     Pulmonary/Chest: Chest: normal symmetry, no tenderness to palpation, normal respiratory excursion            Pulmonary: Normal breath sounds. No rales or ronchi.    Abdominal: Abdomen soft, bowel sounds normoactive, no masses, no hepatosplenomegaly, non-tender, no bruits.     Musculoskeletal: No deformities, clubbing, cyanosis, erythema, or edema observed. There are no spinal abnormalities noted. Normal muscle strength and tone. Pulses full and equal in all extremities, no bruits auscultated.     Neurological: No gross motor or sensory deficits noted, affect appropriate, oriented to time, person, place.     Skin: Warm and dry to the touch, no apparent skin lesions or masses noted.     Psychiatric: She has a normal mood and affect. Her behavior is normal. Judgment and thought content normal.         Procedures      Lab Results   Component Value Date    WBC 8.65 04/11/2017    HGB 13.6 04/11/2017    HCT 41.2 04/11/2017    MCV 89.6 04/11/2017     04/11/2017     Lab Results   Component Value Date    GLUCOSE 101 (H) 04/11/2017    BUN 20 04/11/2017    CREATININE 0.62 04/11/2017    EGFRIFNONA 99 04/11/2017    BCR 32.3 (H) 04/11/2017    CO2 26.0 04/11/2017    CALCIUM 9.1 04/11/2017    ALBUMIN 4.00 04/11/2017    LABIL2 1.4 04/11/2017    AST 40 (H) 04/11/2017    ALT 35 04/11/2017     Lab Results   Component Value Date    CHOL 191 06/15/2017     Lab Results   Component Value Date    TRIG 150 06/15/2017    TRIG 228 (H) 03/30/2016    TRIG 198 09/12/2015     Lab Results   Component Value Date    HDL 44 (L) 06/15/2017    HDL 43 (L) 03/30/2016    HDL 38 (L) 01/20/2015     Lab Results   Component Value Date    LDLCALC 140 (H) 03/30/2016    LDLCALC 88 09/12/2015    LDLCALC 98 03/04/2015     No results found for: LDL  No results found for: HDLLDLRATIO  No components found for: CHOLHDL  Lab Results    Component Value Date    HGBA1C 6.2 (H) 03/31/2016     Lab Results   Component Value Date    TSH 1.36 09/29/2016           ASSESSMENT AND PLAN  Mrs. Means is stable with no evidence of angina, arrhythmia or congestive heart failure.  Antiplatelet therapy with aspirin and Plavix, lipid-lowering therapy with Vytorin and Toprol-XL has been continued.    Diagnoses and all orders for this visit:    Mixed hyperlipidemia    Essential hypertension    Coronary arteriosclerosis    Morbid obesity due to excess calories        Nallely Foster MD  6/16/2017  4:16 PM

## 2017-06-26 ENCOUNTER — OFFICE VISIT (OUTPATIENT)
Dept: OPHTHALMOLOGY | Facility: CLINIC | Age: 57
End: 2017-06-26

## 2017-06-26 DIAGNOSIS — H15.102 EPISCLERITIS, LEFT: Primary | ICD-10-CM

## 2017-06-26 DIAGNOSIS — H33.312 RETINAL TEAR, LEFT: ICD-10-CM

## 2017-06-26 PROCEDURE — 99213 OFFICE O/P EST LOW 20 MIN: CPT | Performed by: OPHTHALMOLOGY

## 2017-06-26 RX ORDER — PREDNISOLONE ACETATE 10 MG/ML
1 SUSPENSION/ DROPS OPHTHALMIC
Qty: 1 EACH | Refills: 1 | OUTPATIENT
Start: 2017-06-26 | End: 2017-08-07

## 2017-06-26 NOTE — PROGRESS NOTES
Subjective   Kimberly Means is a 56 y.o. female.   No chief complaint on file.    HPI     Pain, redness, OS x 4 days, no vision change       Last edited by Obi Blue MD on 6/26/2017  1:43 PM.       Review of Systems    Objective   Base Eye Exam     Visual Acuity (Snellen - Linear)      Right Left   Dist cc 20/25 20/30         Tonometry (Applanation, 1:44 PM)      Right Left   Pressure 17 17               Slit Lamp and Fundus Exam     External Exam      Right Left    External Normal tender globe      Slit Lamp Exam      Right Left    Lids/Lashes Normal Normal    Conjunctiva/Sclera White and quiet 3+ Injection sup temp bulbar    Cornea Clear Clear    Anterior Chamber Deep and quiet Deep and quiet    Iris Round and reactive Round and reactive    Lens Clear Clear    Vitreous Normal Normal      Fundus Exam      Right Left    Disc Normal Normal    Macula Normal Normal    Vessels Normal Normal    Periphery Normal CR scar sup nasal                Assessment/Plan   Diagnoses and all orders for this visit:    Episcleritis, left  Comments:  focal  Orders:  -     prednisoLONE acetate (OMNIPRED) 1 % ophthalmic suspension; Administer 1 drop into the left eye Every 2 (Two) Hours.    Retinal tear, left  Comments:  old, doing well      Plan:   PF OS    Return in about 4 days (around 6/30/2017).

## 2017-06-30 ENCOUNTER — OFFICE VISIT (OUTPATIENT)
Dept: OPHTHALMOLOGY | Facility: CLINIC | Age: 57
End: 2017-06-30

## 2017-06-30 DIAGNOSIS — H15.102 EPISCLERITIS, LEFT: Primary | ICD-10-CM

## 2017-06-30 PROCEDURE — 99212 OFFICE O/P EST SF 10 MIN: CPT | Performed by: OPHTHALMOLOGY

## 2017-06-30 NOTE — PROGRESS NOTES
Subjective   Kimberly Means is a 56 y.o. female.   Chief Complaint   Patient presents with   • Follow-up         Review of Systems    Objective   Base Eye Exam     Visual Acuity (Snellen - Linear)      Right Left   Dist cc 20/25 20/20 -1         Tonometry      Right Left   Pressure  19               Slit Lamp and Fundus Exam     External Exam      Right Left    External Normal tender globe improved      Slit Lamp Exam      Right Left    Lids/Lashes Normal Normal    Conjunctiva/Sclera White and quiet 1+ Injection sup    Cornea Clear Clear    Anterior Chamber Deep and quiet Deep and quiet    Iris Round and reactive Round and reactive    Lens Clear Clear    Vitreous Normal Normal                Assessment/Plan   Diagnoses and all orders for this visit:    Episcleritis, left  Comments:  improved      Plan:     Cont PF q2h x 3 days then qid    Return in about 1 week (around 7/7/2017).

## 2017-07-25 ENCOUNTER — TRANSCRIBE ORDERS (OUTPATIENT)
Dept: LAB | Facility: HOSPITAL | Age: 57
End: 2017-07-25

## 2017-07-25 ENCOUNTER — APPOINTMENT (OUTPATIENT)
Dept: LAB | Facility: HOSPITAL | Age: 57
End: 2017-07-25

## 2017-07-25 DIAGNOSIS — Z98.84 BARIATRIC SURGERY STATUS: Primary | ICD-10-CM

## 2017-07-25 LAB
25(OH)D3 SERPL-MCNC: 38.7 NG/ML (ref 30–100)
ALBUMIN SERPL-MCNC: 4.1 G/DL (ref 3.4–4.8)
ALBUMIN/GLOB SERPL: 1.4 G/DL (ref 1.1–1.8)
ALP SERPL-CCNC: 84 U/L (ref 38–126)
ALT SERPL W P-5'-P-CCNC: 43 U/L (ref 9–52)
ANION GAP SERPL CALCULATED.3IONS-SCNC: 7 MMOL/L (ref 5–15)
AST SERPL-CCNC: 42 U/L (ref 14–36)
BILIRUB SERPL-MCNC: 0.6 MG/DL (ref 0.2–1.3)
BUN BLD-MCNC: 13 MG/DL (ref 7–21)
BUN/CREAT SERPL: 21.7 (ref 7–25)
CALCIUM SPEC-SCNC: 9.2 MG/DL (ref 8.4–10.2)
CHLORIDE SERPL-SCNC: 104 MMOL/L (ref 95–110)
CO2 SERPL-SCNC: 32 MMOL/L (ref 22–31)
CREAT BLD-MCNC: 0.6 MG/DL (ref 0.5–1)
DEPRECATED RDW RBC AUTO: 45 FL (ref 36.4–46.3)
ERYTHROCYTE [DISTWIDTH] IN BLOOD BY AUTOMATED COUNT: 13.7 % (ref 11.5–14.5)
GFR SERPL CREATININE-BSD FRML MDRD: 102 ML/MIN/1.73 (ref 51–120)
GLOBULIN UR ELPH-MCNC: 3 GM/DL (ref 2.3–3.5)
GLUCOSE BLD-MCNC: 89 MG/DL (ref 60–100)
HCT VFR BLD AUTO: 41.8 % (ref 35–45)
HGB BLD-MCNC: 13.6 G/DL (ref 12–15.5)
LYMPHOCYTES # BLD MANUAL: 2.37 10*3/MM3 (ref 0.6–4.2)
LYMPHOCYTES NFR BLD MANUAL: 37 % (ref 10–50)
LYMPHOCYTES NFR BLD MANUAL: 8 % (ref 0–12)
MCH RBC QN AUTO: 29.4 PG (ref 26.5–34)
MCHC RBC AUTO-ENTMCNC: 32.5 G/DL (ref 31.4–36)
MCV RBC AUTO: 90.5 FL (ref 80–98)
MONOCYTES # BLD AUTO: 0.51 10*3/MM3 (ref 0–0.9)
NEUTROPHILS # BLD AUTO: 3.53 10*3/MM3 (ref 2–8.6)
NEUTROPHILS NFR BLD MANUAL: 55 % (ref 37–80)
PLATELET # BLD AUTO: 207 10*3/MM3 (ref 150–450)
PMV BLD AUTO: 10 FL (ref 8–12)
POTASSIUM BLD-SCNC: 4 MMOL/L (ref 3.5–5.1)
PROT SERPL-MCNC: 7.1 G/DL (ref 6.3–8.6)
RBC # BLD AUTO: 4.62 10*6/MM3 (ref 3.77–5.16)
RBC MORPH BLD: NORMAL
SMALL PLATELETS BLD QL SMEAR: ADEQUATE
SODIUM BLD-SCNC: 143 MMOL/L (ref 137–145)
VIT B12 BLD-MCNC: 726 PG/ML (ref 239–931)
WBC MORPH BLD: NORMAL
WBC NRBC COR # BLD: 6.41 10*3/MM3 (ref 3.2–9.8)

## 2017-07-25 PROCEDURE — 80053 COMPREHEN METABOLIC PANEL: CPT | Performed by: SURGERY

## 2017-07-25 PROCEDURE — 85007 BL SMEAR W/DIFF WBC COUNT: CPT | Performed by: SURGERY

## 2017-07-25 PROCEDURE — 82306 VITAMIN D 25 HYDROXY: CPT | Performed by: SURGERY

## 2017-07-25 PROCEDURE — 36415 COLL VENOUS BLD VENIPUNCTURE: CPT | Performed by: SURGERY

## 2017-07-25 PROCEDURE — 82607 VITAMIN B-12: CPT | Performed by: SURGERY

## 2017-07-25 PROCEDURE — 85027 COMPLETE CBC AUTOMATED: CPT | Performed by: SURGERY

## 2017-08-06 ENCOUNTER — HOSPITAL ENCOUNTER (OUTPATIENT)
Dept: CT IMAGING | Facility: HOSPITAL | Age: 57
Discharge: HOME | End: 2017-08-06
Admitting: NURSE PRACTITIONER

## 2017-08-06 PROCEDURE — 74177 CT ABD & PELVIS W/CONTRAST: CPT

## 2017-08-06 PROCEDURE — 0 IOPAMIDOL 61 % SOLUTION: Performed by: NURSE PRACTITIONER

## 2017-08-06 RX ADMIN — IOPAMIDOL 95 ML: 612 INJECTION, SOLUTION INTRAVENOUS at 12:00

## 2017-09-18 DIAGNOSIS — E78.2 MIXED HYPERLIPIDEMIA: ICD-10-CM

## 2017-09-18 RX ORDER — EZETIMIBE AND SIMVASTATIN 10; 40 MG/1; MG/1
1 TABLET ORAL NIGHTLY
Qty: 30 TABLET | Refills: 5 | OUTPATIENT
Start: 2017-09-18 | End: 2019-03-19

## 2017-10-27 DIAGNOSIS — K21.9 GASTROESOPHAGEAL REFLUX DISEASE, ESOPHAGITIS PRESENCE NOT SPECIFIED: ICD-10-CM

## 2017-10-27 DIAGNOSIS — F41.1 GENERALIZED ANXIETY DISORDER: Primary | ICD-10-CM

## 2017-10-27 RX ORDER — OMEPRAZOLE 20 MG/1
20 CAPSULE, DELAYED RELEASE ORAL DAILY
Qty: 30 CAPSULE | Refills: 5 | Status: SHIPPED | OUTPATIENT
Start: 2017-10-27 | End: 2017-11-16

## 2017-10-27 RX ORDER — OMEPRAZOLE 20 MG/1
CAPSULE, DELAYED RELEASE ORAL
Qty: 30 CAPSULE | Refills: 0 | OUTPATIENT
Start: 2017-10-27

## 2017-10-27 RX ORDER — ESCITALOPRAM OXALATE 10 MG/1
10 TABLET ORAL DAILY
Qty: 30 TABLET | Refills: 5 | Status: SHIPPED | OUTPATIENT
Start: 2017-10-27 | End: 2017-12-21 | Stop reason: SDUPTHER

## 2017-10-27 RX ORDER — ESCITALOPRAM OXALATE 10 MG/1
TABLET ORAL
Qty: 90 TABLET | Refills: 0 | OUTPATIENT
Start: 2017-10-27

## 2017-11-16 ENCOUNTER — OFFICE VISIT (OUTPATIENT)
Dept: FAMILY MEDICINE CLINIC | Facility: CLINIC | Age: 57
End: 2017-11-16

## 2017-11-16 VITALS
DIASTOLIC BLOOD PRESSURE: 80 MMHG | SYSTOLIC BLOOD PRESSURE: 120 MMHG | WEIGHT: 170 LBS | BODY MASS INDEX: 32.1 KG/M2 | HEIGHT: 61 IN | HEART RATE: 55 BPM | OXYGEN SATURATION: 98 %

## 2017-11-16 DIAGNOSIS — F41.1 GENERALIZED ANXIETY DISORDER: ICD-10-CM

## 2017-11-16 DIAGNOSIS — J30.9 ALLERGIC RHINITIS, UNSPECIFIED CHRONICITY, UNSPECIFIED SEASONALITY, UNSPECIFIED TRIGGER: ICD-10-CM

## 2017-11-16 DIAGNOSIS — Z12.31 SCREENING MAMMOGRAM, ENCOUNTER FOR: ICD-10-CM

## 2017-11-16 DIAGNOSIS — K21.00 GASTROESOPHAGEAL REFLUX DISEASE WITH ESOPHAGITIS: Primary | ICD-10-CM

## 2017-11-16 DIAGNOSIS — Z80.3 FAMILY HISTORY OF BREAST CANCER IN FIRST DEGREE RELATIVE: ICD-10-CM

## 2017-11-16 DIAGNOSIS — I25.10 CORONARY ARTERIOSCLEROSIS: ICD-10-CM

## 2017-11-16 DIAGNOSIS — E55.9 VITAMIN D DEFICIENCY: ICD-10-CM

## 2017-11-16 DIAGNOSIS — Z98.890 STATUS POST GASTRIC SURGERY: ICD-10-CM

## 2017-11-16 PROCEDURE — 99214 OFFICE O/P EST MOD 30 MIN: CPT | Performed by: FAMILY MEDICINE

## 2017-11-16 RX ORDER — DEXLANSOPRAZOLE 60 MG/1
60 CAPSULE, DELAYED RELEASE ORAL DAILY
Qty: 30 CAPSULE | Refills: 5 | Status: SHIPPED | OUTPATIENT
Start: 2017-11-16 | End: 2017-12-16

## 2017-11-28 RX ORDER — METOPROLOL SUCCINATE 50 MG/1
50 TABLET, EXTENDED RELEASE ORAL DAILY
Qty: 90 TABLET | Refills: 3 | Status: SHIPPED | OUTPATIENT
Start: 2017-11-28 | End: 2019-01-10 | Stop reason: SDUPTHER

## 2017-12-06 ENCOUNTER — TRANSCRIBE ORDERS (OUTPATIENT)
Dept: URGENT CARE | Facility: CLINIC | Age: 57
End: 2017-12-06

## 2017-12-06 DIAGNOSIS — M62.838 MUSCLE SPASMS OF NECK: Primary | ICD-10-CM

## 2017-12-06 RX ORDER — TIZANIDINE 4 MG/1
4 TABLET ORAL EVERY 8 HOURS PRN
Qty: 20 TABLET | Refills: 0 | Status: SHIPPED | OUTPATIENT
Start: 2017-12-06 | End: 2017-12-13

## 2017-12-21 RX ORDER — DEXLANSOPRAZOLE 60 MG/1
60 CAPSULE, DELAYED RELEASE ORAL DAILY
Qty: 90 CAPSULE | Refills: 3 | Status: SHIPPED | OUTPATIENT
Start: 2017-12-21 | End: 2018-01-20

## 2017-12-21 RX ORDER — FLUTICASONE PROPIONATE 50 MCG
2 SPRAY, SUSPENSION (ML) NASAL DAILY
Qty: 16 G | Refills: 5 | Status: SHIPPED | OUTPATIENT
Start: 2017-12-21 | End: 2019-04-11 | Stop reason: SDUPTHER

## 2017-12-21 RX ORDER — ERGOCALCIFEROL 1.25 MG/1
CAPSULE ORAL
Qty: 30 CAPSULE | Refills: 2 | Status: SHIPPED | OUTPATIENT
Start: 2017-12-21 | End: 2019-01-11

## 2017-12-21 RX ORDER — ESCITALOPRAM OXALATE 10 MG/1
10 TABLET ORAL DAILY
Qty: 90 TABLET | Refills: 3 | Status: SHIPPED | OUTPATIENT
Start: 2017-12-21 | End: 2018-09-22 | Stop reason: SDUPTHER

## 2017-12-21 RX ORDER — CYANOCOBALAMIN 1000 UG/ML
1000 INJECTION, SOLUTION INTRAMUSCULAR; SUBCUTANEOUS
Qty: 1 ML | Refills: 5 | Status: SHIPPED | OUTPATIENT
Start: 2017-12-21 | End: 2018-12-28 | Stop reason: SDUPTHER

## 2017-12-21 RX ORDER — CLOPIDOGREL BISULFATE 75 MG/1
75 TABLET ORAL DAILY
Qty: 90 TABLET | Refills: 3 | Status: SHIPPED | OUTPATIENT
Start: 2017-12-21 | End: 2018-04-09 | Stop reason: SDUPTHER

## 2018-04-09 DIAGNOSIS — I25.10 CORONARY ARTERIOSCLEROSIS: ICD-10-CM

## 2018-04-09 RX ORDER — CLOPIDOGREL BISULFATE 75 MG/1
75 TABLET ORAL DAILY
Qty: 90 TABLET | Refills: 3 | Status: SHIPPED | OUTPATIENT
Start: 2018-04-09 | End: 2019-01-21 | Stop reason: SDUPTHER

## 2018-04-09 RX ORDER — CLOPIDOGREL BISULFATE 75 MG/1
TABLET ORAL
Qty: 30 TABLET | Refills: 0 | Status: CANCELLED | OUTPATIENT
Start: 2018-04-09

## 2018-04-10 ENCOUNTER — APPOINTMENT (OUTPATIENT)
Dept: GENERAL RADIOLOGY | Facility: HOSPITAL | Age: 58
End: 2018-04-10

## 2018-04-10 ENCOUNTER — HOSPITAL ENCOUNTER (EMERGENCY)
Facility: HOSPITAL | Age: 58
Discharge: HOME | End: 2018-04-10
Attending: FAMILY MEDICINE | Admitting: FAMILY MEDICINE

## 2018-04-10 VITALS
TEMPERATURE: 97.8 F | DIASTOLIC BLOOD PRESSURE: 65 MMHG | HEIGHT: 61 IN | BODY MASS INDEX: 31.15 KG/M2 | HEART RATE: 67 BPM | RESPIRATION RATE: 14 BRPM | OXYGEN SATURATION: 95 % | WEIGHT: 165 LBS | SYSTOLIC BLOOD PRESSURE: 140 MMHG

## 2018-04-10 DIAGNOSIS — R00.2 PALPITATIONS: Primary | ICD-10-CM

## 2018-04-10 DIAGNOSIS — R11.0 NAUSEA: ICD-10-CM

## 2018-04-10 DIAGNOSIS — R42 DIZZINESS: ICD-10-CM

## 2018-04-10 LAB
ALBUMIN SERPL-MCNC: 4.1 G/DL (ref 3.4–4.8)
ALBUMIN/GLOB SERPL: 1.2 G/DL (ref 1.1–1.8)
ALP SERPL-CCNC: 72 U/L (ref 38–126)
ALT SERPL W P-5'-P-CCNC: 31 U/L (ref 9–52)
ANION GAP SERPL CALCULATED.3IONS-SCNC: 11 MMOL/L (ref 5–15)
AST SERPL-CCNC: 36 U/L (ref 14–36)
BASOPHILS # BLD AUTO: 0.03 10*3/MM3 (ref 0–0.2)
BASOPHILS NFR BLD AUTO: 0.4 % (ref 0–2)
BILIRUB SERPL-MCNC: 0.3 MG/DL (ref 0.2–1.3)
BUN BLD-MCNC: 15 MG/DL (ref 7–21)
BUN/CREAT SERPL: 22.1 (ref 7–25)
CALCIUM SPEC-SCNC: 9.3 MG/DL (ref 8.4–10.2)
CHLORIDE SERPL-SCNC: 102 MMOL/L (ref 95–110)
CO2 SERPL-SCNC: 29 MMOL/L (ref 22–31)
CREAT BLD-MCNC: 0.68 MG/DL (ref 0.5–1)
DEPRECATED RDW RBC AUTO: 41.8 FL (ref 36.4–46.3)
EOSINOPHIL # BLD AUTO: 0.13 10*3/MM3 (ref 0–0.7)
EOSINOPHIL NFR BLD AUTO: 1.6 % (ref 0–7)
ERYTHROCYTE [DISTWIDTH] IN BLOOD BY AUTOMATED COUNT: 13.2 % (ref 11.5–14.5)
GFR SERPL CREATININE-BSD FRML MDRD: 89 ML/MIN/1.73 (ref 51–120)
GLOBULIN UR ELPH-MCNC: 3.5 GM/DL (ref 2.3–3.5)
GLUCOSE BLD-MCNC: 77 MG/DL (ref 60–100)
HCT VFR BLD AUTO: 44 % (ref 35–45)
HGB BLD-MCNC: 14.9 G/DL (ref 12–15.5)
HOLD SPECIMEN: NORMAL
HOLD SPECIMEN: NORMAL
IMM GRANULOCYTES # BLD: 0.02 10*3/MM3 (ref 0–0.02)
IMM GRANULOCYTES NFR BLD: 0.2 % (ref 0–0.5)
LYMPHOCYTES # BLD AUTO: 2.77 10*3/MM3 (ref 0.6–4.2)
LYMPHOCYTES NFR BLD AUTO: 34.5 % (ref 10–50)
MCH RBC QN AUTO: 29.6 PG (ref 26.5–34)
MCHC RBC AUTO-ENTMCNC: 33.9 G/DL (ref 31.4–36)
MCV RBC AUTO: 87.5 FL (ref 80–98)
MONOCYTES # BLD AUTO: 0.49 10*3/MM3 (ref 0–0.9)
MONOCYTES NFR BLD AUTO: 6.1 % (ref 0–12)
NEUTROPHILS # BLD AUTO: 4.59 10*3/MM3 (ref 2–8.6)
NEUTROPHILS NFR BLD AUTO: 57.2 % (ref 37–80)
NT-PROBNP SERPL-MCNC: 96.9 PG/ML (ref 0–900)
PLATELET # BLD AUTO: 220 10*3/MM3 (ref 150–450)
PMV BLD AUTO: 9.8 FL (ref 8–12)
POTASSIUM BLD-SCNC: 4 MMOL/L (ref 3.5–5.1)
PROT SERPL-MCNC: 7.6 G/DL (ref 6.3–8.6)
RBC # BLD AUTO: 5.03 10*6/MM3 (ref 3.77–5.16)
SODIUM BLD-SCNC: 142 MMOL/L (ref 137–145)
TROPONIN I SERPL-MCNC: <0.012 NG/ML
WBC NRBC COR # BLD: 8.03 10*3/MM3 (ref 3.2–9.8)
WHOLE BLOOD HOLD SPECIMEN: NORMAL
WHOLE BLOOD HOLD SPECIMEN: NORMAL

## 2018-04-10 PROCEDURE — 93010 ELECTROCARDIOGRAM REPORT: CPT | Performed by: INTERNAL MEDICINE

## 2018-04-10 PROCEDURE — 80053 COMPREHEN METABOLIC PANEL: CPT | Performed by: FAMILY MEDICINE

## 2018-04-10 PROCEDURE — 93005 ELECTROCARDIOGRAM TRACING: CPT | Performed by: FAMILY MEDICINE

## 2018-04-10 PROCEDURE — 84484 ASSAY OF TROPONIN QUANT: CPT | Performed by: FAMILY MEDICINE

## 2018-04-10 PROCEDURE — 71045 X-RAY EXAM CHEST 1 VIEW: CPT

## 2018-04-10 PROCEDURE — 83880 ASSAY OF NATRIURETIC PEPTIDE: CPT | Performed by: FAMILY MEDICINE

## 2018-04-10 PROCEDURE — 85025 COMPLETE CBC W/AUTO DIFF WBC: CPT | Performed by: FAMILY MEDICINE

## 2018-04-10 PROCEDURE — 99283 EMERGENCY DEPT VISIT LOW MDM: CPT

## 2018-04-10 RX ORDER — ONDANSETRON 4 MG/1
4 TABLET, ORALLY DISINTEGRATING ORAL EVERY 6 HOURS PRN
Qty: 12 TABLET | Refills: 0 | Status: SHIPPED | OUTPATIENT
Start: 2018-04-10 | End: 2018-04-13

## 2018-04-10 RX ORDER — ASPIRIN 325 MG
325 TABLET ORAL ONCE
Status: COMPLETED | OUTPATIENT
Start: 2018-04-10 | End: 2018-04-10

## 2018-04-10 RX ORDER — SODIUM CHLORIDE 0.9 % (FLUSH) 0.9 %
10 SYRINGE (ML) INJECTION AS NEEDED
Status: DISCONTINUED | OUTPATIENT
Start: 2018-04-10 | End: 2018-04-10 | Stop reason: HOSPADM

## 2018-04-10 RX ADMIN — Medication 10 ML: at 11:22

## 2018-04-10 RX ADMIN — ASPIRIN 325 MG: 325 TABLET ORAL at 11:22

## 2018-04-10 NOTE — ED PROVIDER NOTES
Subjective   Patient presents to emergency department for palpitations, nausea, shortness of breath, and dizziness which started spontaneously while she was at work today.  Episode has lasted approximately 3 hours.  Hx of coronary angioplasty with stent placement in 2006, HTN, hyperlipidemia, obesity.  Denies smoking.  She was given oral Zofran prior to arrival. Patient states the symptoms have resolved since then but seem to come in waves.  Cardiologist is Dr Foster.        History provided by:  Patient   used: No    Chest Pain   Chest pain location: no chest pain, (L) arm discomfort.  Pain radiates to:  L arm  Pain severity:  Mild  Onset quality:  Sudden  Duration:  3 hours  Context: at rest    Context: not breathing    Relieved by:  Nothing  Associated symptoms: anxiety, dizziness, nausea and shortness of breath    Associated symptoms: no abdominal pain, no altered mental status, no anorexia, no back pain, no claudication, no cough, no diaphoresis, no dysphagia, no fatigue, no fever, no headache, no heartburn, no lower extremity edema, no near-syncope, no numbness, no orthopnea, no palpitations, no PND, no syncope, no vomiting and no weakness    Risk factors: high cholesterol, hypertension and obesity    Risk factors: no diabetes mellitus, no prior DVT/PE and no smoking    Dizziness   Quality:  Room spinning  Severity:  Moderate  Onset quality:  Sudden  Duration:  3 hours  Timing:  Intermittent  Progression:  Resolved  Context: head movement    Context: not with loss of consciousness    Associated symptoms: chest pain, nausea and shortness of breath    Associated symptoms: no headaches, no palpitations, no syncope, no vomiting and no weakness    Shortness of Breath   Associated symptoms: chest pain    Associated symptoms: no abdominal pain, no claudication, no cough, no diaphoresis, no fever, no headaches, no neck pain, no PND, no syncope and no vomiting        Review of Systems    Constitutional: Negative for diaphoresis, fatigue and fever.   HENT: Negative for trouble swallowing.    Eyes: Negative for visual disturbance.   Respiratory: Positive for shortness of breath. Negative for cough.    Cardiovascular: Positive for chest pain. Negative for palpitations, orthopnea, claudication, syncope, PND and near-syncope.   Gastrointestinal: Positive for nausea. Negative for abdominal pain, anorexia, heartburn and vomiting.   Genitourinary: Negative for dysuria and flank pain.   Musculoskeletal: Negative for back pain, neck pain and neck stiffness.   Skin: Negative for color change.   Allergic/Immunologic: Negative for immunocompromised state.   Neurological: Positive for dizziness. Negative for tremors, seizures, syncope, facial asymmetry, speech difficulty, weakness, light-headedness, numbness and headaches.   Hematological: Does not bruise/bleed easily.   Psychiatric/Behavioral: Negative for confusion.       Past Medical History:   Diagnosis Date   • Abdominal pain    • Acute bronchitis    • Acute left otitis media    • Acute otitis externa    • Allergic rhinitis     Due to pollen   • Ankle pain    • Asthma    • Astigmatism    • Bacterial pneumonia 12/02/2015   • Contact dermatitis    • Coronary arteriosclerosis     stent to LAD 2006      • Cough    • Depressive disorder     pt states she has not suffered from depression, takes lexapro for anxiety   • Diarrhea    • Dysuria    • Encounter for Papanicolaou smear of cervix 06/01/2009   • Epigastric pain    • Epistaxis    • Essential hypertension    • Fatigue    • Fever    • Functional diarrhea    • Gastroenteritis    • Generalized anxiety disorder 01/2015    given samples of lexapro.   • Heel pain    • Hematochezia    • Hernia of abdominal wall 05/13/2016    incarcerated- primarily repaired. Now recurrent      • History of bone density study 05/27/2014   • History of colonoscopy     diagnostic (screening) 11788- Normal colonoscopy.;  Last  "Performed: 12/10/2015   • History of esophagogastroduodenoscopy     epigastric pain, gastritis without bleeding,gastroesophageal reflux without esophagitis;  Last Performed: 01/06/1999   • History of mammogram 06/29/2016   • History of screening mammography    • Hyperglycemia    • Hyperlipidemia    • Impaired glucose tolerance    • Irritable bowel syndrome    • Measles    • Mechanical complication of internal orthopedic device    • Migraine    • Morbid obesity due to excess calories    • Mumps    • Myopia    • Nausea    • Need for DTaP vaccine 01/17/2015   • Retinal tear    • Spasm of back muscles    • Sprain of ankle    • Umbilical hernia    • Upper respiratory infection    • Urinary tract infectious disease    • Ventral hernia     Open ventral hernioplasty. Incarcerated ventral hernia;  Last Performed: 07/17/2012   • Vitamin D deficiency    • Vitreous detachment    • Vitreous opacities        Allergies   Allergen Reactions   • Kiwi Extract Anaphylaxis   • Dilaudid [Hydromorphone] Other (See Comments)     Rapid heart rate  Rapid heart rate   • Latex    • Lortab [Hydrocodone-Acetaminophen] Confusion and Hallucinations   • Morphine And Related Itching   • Paxil [Paroxetine] Other (See Comments)     \"MESSES WITH MY MIND\"   • Sulfa Antibiotics Hives and Rash       Past Surgical History:   Procedure Laterality Date   • CERVICAL CONIZATION      stress urinary incontinence,endocervical dysplasia;  Last Performed: 09/05/1989   • CHOLECYSTECTOMY  08/26/1993    Laparoscopic   • COLONOSCOPY N/A 2/17/2017    Procedure: COLONOSCOPY;  Surgeon: Young Villegas MD;  Location: [facility] ENDOSCOPY;  Service:    • CORONARY ANGIOPLASTY      stent to LAD;  Last Performed: 2006   • CORONARY ANGIOPLASTY WITH STENT PLACEMENT     • EPIGASTRIC HERNIA REPAIR  02/02/1973   • GASTRIC SLEEVE LAPAROSCOPIC  11/08/2016   • HYSTERECTOMY  09/05/1989    mmk urethropexy   • TONSILLECTOMY      postoperative tonsillectomy,transfixed bleeding vessel, right " "tonsillar fossa;  Last Performed: 01/05/1974   • VAGINAL DELIVERY      x 2       Family History   Problem Relation Age of Onset   • Thyroid disease Mother    • Diabetes type II Mother    • Long QT syndrome Mother    • Hypothyroidism Mother    • Thyroid disease Father    • Coronary artery disease Father    • Tremor Father    • Diabetes Father    • COPD Father    • Coronary artery disease Other    • Diabetes Other    • Hypertension Other        Social History     Social History   • Marital status:      Spouse name: Milla   • Number of children: 2     Occupational History   •  [employer]     Social History Main Topics   • Smoking status: Never Smoker   • Smokeless tobacco: Never Used   • Alcohol use No   • Drug use: No   • Sexual activity: Yes     Partners: Male     Birth control/ protection: Post-menopausal     Other Topics Concern   • Not on file           Objective      /84 (BP Location: Left arm, Patient Position: Lying)   Pulse 67   Temp 97.8 °F (36.6 °C) (Oral)   Resp 12   Ht 154.9 cm (61\")   Wt 74.8 kg (165 lb)   SpO2 97%   BMI 31.18 kg/m²     Physical Exam   Constitutional: She is oriented to person, place, and time. She appears well-developed and well-nourished.   HENT:   Head: Normocephalic and atraumatic.   Eyes: EOM are normal. Pupils are equal, round, and reactive to light.   Cardiovascular: Normal rate, regular rhythm, normal heart sounds and intact distal pulses.    Pulmonary/Chest: Effort normal and breath sounds normal. No respiratory distress. She has no wheezes.   Abdominal: Soft. Bowel sounds are normal. She exhibits no distension and no mass. There is no tenderness. There is no guarding.   Neurological: She is alert and oriented to person, place, and time. She has normal strength. No cranial nerve deficit or sensory deficit. Gait abnormal. GCS eye subscore is 4. GCS verbal subscore is 5. GCS motor subscore is 6.   Skin: Skin is warm and dry. "   Psychiatric: She has a normal mood and affect. Her behavior is normal. Thought content normal.   Nursing note and vitals reviewed.      ECG 12 Lead    Date/Time: 4/10/2018 12:04 PM  Performed by: ROSELINE WATTERS  Authorized by: ANTIONETTE MAHONEY   Interpreted by physician  Comparison: not compared with previous ECG   Rhythm: sinus rhythm  Rate: normal  BPM: 64  ST Segments: ST segments normal  Clinical impression: normal ECG               ED Course  ED Course   Comment By Time   Cardiac work up grossly negative, HEART Score 3.  Dizziness/vertigo seems to be positional and intermittent.  She is agreeable to follow up with PCP further evaluation/treatment.  Also recommend follow up with Dr Foster for further evaluation. Roseline Watters PA-C 04/10 1156      Results for orders placed or performed during the hospital encounter of 04/10/18   Troponin   Result Value Ref Range    Troponin I <0.012 <=0.034 ng/mL   Comprehensive Metabolic Panel   Result Value Ref Range    Glucose 77 60 - 100 mg/dL    BUN 15 7 - 21 mg/dL    Creatinine 0.68 0.50 - 1.00 mg/dL    Sodium 142 137 - 145 mmol/L    Potassium 4.0 3.5 - 5.1 mmol/L    Chloride 102 95 - 110 mmol/L    CO2 29.0 22.0 - 31.0 mmol/L    Calcium 9.3 8.4 - 10.2 mg/dL    Total Protein 7.6 6.3 - 8.6 g/dL    Albumin 4.10 3.40 - 4.80 g/dL    ALT (SGPT) 31 9 - 52 U/L    AST (SGOT) 36 14 - 36 U/L    Alkaline Phosphatase 72 38 - 126 U/L    Total Bilirubin 0.3 0.2 - 1.3 mg/dL    eGFR Non  Amer 89 51 - 120 mL/min/1.73    Globulin 3.5 2.3 - 3.5 gm/dL    A/G Ratio 1.2 1.1 - 1.8 g/dL    BUN/Creatinine Ratio 22.1 7.0 - 25.0    Anion Gap 11.0 5.0 - 15.0 mmol/L   BNP   Result Value Ref Range    proBNP 96.9 0.0 - 900.0 pg/mL   CBC Auto Differential   Result Value Ref Range    WBC 8.03 3.20 - 9.80 10*3/mm3    RBC 5.03 3.77 - 5.16 10*6/mm3    Hemoglobin 14.9 12.0 - 15.5 g/dL    Hematocrit 44.0 35.0 - 45.0 %    MCV 87.5 80.0 - 98.0 fL    MCH 29.6 26.5 - 34.0 pg    MCHC 33.9  31.4 - 36.0 g/dL    RDW 13.2 11.5 - 14.5 %    RDW-SD 41.8 36.4 - 46.3 fl    MPV 9.8 8.0 - 12.0 fL    Platelets 220 150 - 450 10*3/mm3    Neutrophil % 57.2 37.0 - 80.0 %    Lymphocyte % 34.5 10.0 - 50.0 %    Monocyte % 6.1 0.0 - 12.0 %    Eosinophil % 1.6 0.0 - 7.0 %    Basophil % 0.4 0.0 - 2.0 %    Immature Grans % 0.2 0.0 - 0.5 %    Neutrophils, Absolute 4.59 2.00 - 8.60 10*3/mm3    Lymphocytes, Absolute 2.77 0.60 - 4.20 10*3/mm3    Monocytes, Absolute 0.49 0.00 - 0.90 10*3/mm3    Eosinophils, Absolute 0.13 0.00 - 0.70 10*3/mm3    Basophils, Absolute 0.03 0.00 - 0.20 10*3/mm3    Immature Grans, Absolute 0.02 0.00 - 0.02 10*3/mm3     Xr Finger 2+ View Left    Result Date: 3/18/2018  Narrative: Procedure: Left hand index finger 3 views Reason for exam: Hit index finger with hammer last p.m. FINDINGS: Left hand index finger distal phalange distal tuft oblique linear lucency suspicious for hairline nondisplaced fracture in this region. Surrounding soft tissue swelling. Otherwise bony and soft tissue structures of the left hand index finger unremarkable.     Impression: 1.  Left hand index finger distal phalange distal tuft oblique linear lucency suspicious for hairline nondisplaced fracture in this region. Surrounding soft tissue swelling. 2.  Otherwise unremarkable left hand index finger. Electronically signed by:  Adam Durand MD  3/18/2018 9:50 AM CDT Workstation: [id number]    Xr Chest 1 View    Result Date: 4/10/2018  Narrative: [facility] [place] Patient Name: MRS. BRET ASENCIO ORDERING: ANTIONETTE MAHONEY ATTENDING: ANTIONETTE MAHONEY REFERRING: ANTIONETTE MAHONEY ----------------------- PROCEDURE: Portable chest x-ray TECHNIQUE: Single AP view of the chest COMPARISON: 10/24/2016 HISTORY: Chest Pain triage protocol chest pain FINDINGS:  Life-support devices: None Lungs/pleura: No overt pulmonary vascular congestion, focal pulmonary parenchymal opacity, pleural effusion or pneumothorax. Heart, hilar  and mediastinal structures: The aorta appears tortuous , the hilar and mediastinal structures appear otherwise unremarkable. Cardiac silhouette is normal in size.     Impression: CONCLUSION: No acute pulmonary disease. Electronically signed by:  Partha Valencia MD  4/10/2018 11:34 AM CDT Workstation: [id number]              HEART Score (for prediction of 6-week risk of major adverse cardiac event) reviewed and/or performed as part of the patient evaluation and treatment planning process.  The result associated with this review/performance is: 3       MDM    Final diagnoses:   Palpitations   Dizziness   Nausea                 Chance Watters PA-C  04/10/18 [id number]

## 2018-04-11 ENCOUNTER — TELEPHONE (OUTPATIENT)
Dept: FAMILY MEDICINE CLINIC | Facility: CLINIC | Age: 58
End: 2018-04-11

## 2018-04-11 ENCOUNTER — OFFICE VISIT (OUTPATIENT)
Dept: CARDIOLOGY | Facility: CLINIC | Age: 58
End: 2018-04-11

## 2018-04-11 VITALS
HEART RATE: 66 BPM | HEIGHT: 61 IN | BODY MASS INDEX: 34.2 KG/M2 | SYSTOLIC BLOOD PRESSURE: 128 MMHG | OXYGEN SATURATION: 97 % | WEIGHT: 181.13 LBS | DIASTOLIC BLOOD PRESSURE: 80 MMHG

## 2018-04-11 DIAGNOSIS — E78.2 MIXED HYPERLIPIDEMIA: ICD-10-CM

## 2018-04-11 DIAGNOSIS — I25.10 CORONARY ARTERIOSCLEROSIS: Primary | ICD-10-CM

## 2018-04-11 DIAGNOSIS — R00.2 PALPITATION: ICD-10-CM

## 2018-04-11 DIAGNOSIS — I10 ESSENTIAL HYPERTENSION: ICD-10-CM

## 2018-04-11 DIAGNOSIS — R42 DIZZINESS: ICD-10-CM

## 2018-04-11 PROCEDURE — 99214 OFFICE O/P EST MOD 30 MIN: CPT | Performed by: INTERNAL MEDICINE

## 2018-04-11 NOTE — PROGRESS NOTES
"Kimberly Means  57 y.o. female    04/11/2018  1. Coronary arteriosclerosis    2. Essential hypertension    3. Mixed hyperlipidemia    4. Dizziness    5. Palpitation        History of Present Illness    Mrs. Means is here for follow-up of her above stated problems.  She was feeling well until yesterday morning when she developed fluttering the chest, anxiety subsequently has had vertigo.  Her symptoms persisted and hence she was seen in the emergency room.  Workup was negative with normal EKG, troponins were within normal limits, electrolytes and CBC were within normal limits.  BNP was normal.  The patient was reassured and advised to follow-up with cardiology.  The patient denied any kenneth chest pain but did experience mild chest pressure when she had fluttering in the chest.  She denied any palpitations today.  No chest pain on exertion is reported.  She has had a congestion and mild ringing in the ears on the right side.  Clinical exam was unremarkable.  Blood pressure was in the normal range.  No signs of congestive heart failure was noted.  Her history was reviewed in detail.      SUBJECTIVE    Allergies   Allergen Reactions   • Kiwi Extract Anaphylaxis   • Lortab [Hydrocodone-Acetaminophen] Confusion and Hallucinations   • Paxil [Paroxetine] Other (See Comments)     \"MESSES WITH MY MIND\"   • Dilaudid [Hydromorphone] Other (See Comments)     Rapid heart rate  Rapid heart rate   • Latex Rash   • Morphine And Related Itching   • Sulfa Antibiotics Hives and Rash         Past Medical History:   Diagnosis Date   • Abdominal pain    • Acute bronchitis    • Acute left otitis media    • Acute otitis externa    • Allergic rhinitis     Due to pollen   • Ankle pain    • Asthma    • Astigmatism    • Bacterial pneumonia 12/02/2015   • Contact dermatitis    • Coronary arteriosclerosis     stent to LAD 2006      • Cough    • Depressive disorder     pt states she has not suffered from depression, takes lexapro for anxiety   • " Diarrhea    • Dysuria    • Encounter for Papanicolaou smear of cervix 06/01/2009   • Epigastric pain    • Epistaxis    • Essential hypertension    • Fatigue    • Fever    • Functional diarrhea    • Gastroenteritis    • Generalized anxiety disorder 01/2015    given samples of lexapro.   • Heel pain    • Hematochezia    • Hernia of abdominal wall 05/13/2016    incarcerated- primarily repaired. Now recurrent      • History of bone density study 05/27/2014   • History of colonoscopy     diagnostic (screening) 92979- Normal colonoscopy.;  Last Performed: 12/10/2015   • History of esophagogastroduodenoscopy     epigastric pain, gastritis without bleeding,gastroesophageal reflux without esophagitis;  Last Performed: 01/06/1999   • History of mammogram 06/29/2016   • History of screening mammography    • Hyperglycemia    • Hyperlipidemia    • Impaired glucose tolerance    • Irritable bowel syndrome    • Measles    • Mechanical complication of internal orthopedic device    • Migraine    • Morbid obesity due to excess calories    • Mumps    • Myopia    • Nausea    • Need for DTaP vaccine 01/17/2015   • Retinal tear    • Spasm of back muscles    • Sprain of ankle    • Umbilical hernia    • Upper respiratory infection    • Urinary tract infectious disease    • Ventral hernia     Open ventral hernioplasty. Incarcerated ventral hernia;  Last Performed: 07/17/2012   • Vitamin D deficiency    • Vitreous detachment    • Vitreous opacities          Past Surgical History:   Procedure Laterality Date   • CERVICAL CONIZATION      stress urinary incontinence,endocervical dysplasia;  Last Performed: 09/05/1989   • CHOLECYSTECTOMY  08/26/1993    Laparoscopic   • COLONOSCOPY N/A 2/17/2017    Procedure: COLONOSCOPY;  Surgeon: Young Villegas MD;  Location: [facility] ENDOSCOPY;  Service:    • CORONARY ANGIOPLASTY      stent to LAD;  Last Performed: 2006   • CORONARY ANGIOPLASTY WITH STENT PLACEMENT     • EPIGASTRIC HERNIA REPAIR  02/02/1973   •  GASTRIC SLEEVE LAPAROSCOPIC  11/08/2016   • HYSTERECTOMY  09/05/1989    mmk urethropexy   • TONSILLECTOMY      postoperative tonsillectomy,transfixed bleeding vessel, right tonsillar fossa;  Last Performed: 01/05/1974   • VAGINAL DELIVERY      x 2         Family History   Problem Relation Age of Onset   • Thyroid disease Mother    • Diabetes type II Mother    • Long QT syndrome Mother    • Hypothyroidism Mother    • Thyroid disease Father    • Coronary artery disease Father    • Tremor Father    • Diabetes Father    • COPD Father    • Coronary artery disease Other    • Diabetes Other    • Hypertension Other          Social History     Social History   • Marital status:      Spouse name: Milla   • Number of children: 2   • Years of education: N/A     Occupational History   •  [employer]     Social History Main Topics   • Smoking status: Never Smoker   • Smokeless tobacco: Never Used   • Alcohol use No   • Drug use: No   • Sexual activity: Yes     Partners: Male     Birth control/ protection: Post-menopausal, Surgical     Other Topics Concern   • Not on file     Social History Narrative   • No narrative on file         Current Outpatient Prescriptions   Medication Sig Dispense Refill   • albuterol (PROVENTIL HFA;VENTOLIN HFA) 108 (90 BASE) MCG/ACT inhaler Inhale 2 puffs every 4 (four) hours as needed for wheezing.     • ALPRAZolam (XANAX) 0.5 MG tablet Take 0.5 mg by mouth. 2-3 times daily as needed for anxiety     • aspirin 81 MG tablet Take 81 mg by mouth every night.     • butalbital-aspirin-caffeine (FIORINAL) -40 MG capsule Take 1 capsule by mouth every 4 (four) hours as needed for headaches.     • clopidogrel (PLAVIX) 75 MG tablet Take 1 tablet by mouth Daily. 90 tablet 3   • cyanocobalamin 1000 MCG/ML injection Inject 1 mL under the skin Every 28 (Twenty-Eight) Days. 1 mL 5   • escitalopram (LEXAPRO) 10 MG tablet Take 1 tablet by mouth Daily. 90 tablet 3   •  "ezetimibe-simvastatin (VYTORIN) 10-40 MG per tablet Take 1 tablet by mouth Every Night. 30 tablet 5   • fluticasone (FLONASE) 50 MCG/ACT nasal spray 2 sprays into each nostril Daily. Administer 2 sprays in each nostril for each dose. 16 g 5   • metoprolol succinate XL (TOPROL-XL) 50 MG 24 hr tablet Take 1 tablet by mouth Daily. 90 tablet 3   • ondansetron ODT (ZOFRAN-ODT) 4 MG disintegrating tablet Take 1 tablet by mouth Every 6 (Six) Hours As Needed for Nausea for up to 3 days. 12 tablet 0   • promethazine (PHENERGAN) 25 MG tablet Take 1 tablet by mouth Every 6 (Six) Hours As Needed for Nausea or Vomiting. 30 tablet 0   • vitamin D (ERGOCALCIFEROL) 77140 units capsule capsule 1 capsule Monday and Friday 30 capsule 2     No current facility-administered medications for this visit.          OBJECTIVE    /80 (BP Location: Left arm, Patient Position: Sitting, Cuff Size: Adult)   Pulse 66   Ht 154.9 cm (61\")   Wt 82.2 kg (181 lb 2 oz)   LMP 04/11/1989 (Within Months)   SpO2 97%   Breastfeeding? No   BMI 34.22 kg/m²         Review of Systems     Constitutional:  Denies recent weight loss, weight gain, fever or chills, no change in exercise tolerance     HENT:  Denies any hearing loss, epistaxis, hoarseness, or difficulty speaking.     Eyes: Wears eyeglasses or contact lenses     Respiratory:  Denies dyspnea with exertion,no cough, wheezing, or hemoptysis.     Cardiovascular:  palpations, No chest pain, orthopnea, PND, peripheral edema, syncope, or claudication.     Gastrointestinal:  Denies change in bowel habits, dyspepsia, ulcer disease, hematochezia, or melena.     Endocrine: Negative for cold intolerance, heat intolerance, polydipsia, polyphagia and polyuria. Denies any history of weight change, heat/cold intolerance, polydipsia, polyuria     Genitourinary: Negative.      Musculoskeletal: Denies any history of arthritic symptoms or back problems     Skin:  Denies any change in hair or nails, rashes, or " skin lesions.     Allergic/Immunologic: Negative.  Negative for environmental allergies, food allergies and immunocompromised state.     Neurological:  Denies any history of recurrent headaches, strokes, TIA, or seizure disorder. c/o vertigo/dizziness    Hematological: Denies any food allergies, seasonal allergies, bleeding disorders, or lymphadenopathy.     Psychiatric/Behavioral: Denies any history of depression, substance abuse, or change in cognitive function.         Physical Exam     Constitutional: Cooperative, alert and oriented, well-developed, well-nourished, in no acute distress.     HENT:   Head: Normocephalic, normal hair patterns, no masses or tenderness.  Ears, Nose, and Throat: No gross abnormalities. No pallor or cyanosis. Eyes: EOMS intact, PERRL, conjunctivae and lids unremarkable. Fundoscopic exam and visual fields not performed.   Neck: No palpable masses or adenopathy, no thyromegaly, no JVD, carotid pulses are full and equal bilaterally and without  Bruits.     Cardiovascular: Regular rhythm, S1 and S2 normal, no S3 or S4.  No murmurs, gallops, or rubs detected.     Pulmonary/Chest: Chest: normal symmetry, no tenderness to palpation, normal respiratory excursion, no intercostal retraction, no use of accessory muscles.            Pulmonary: Normal breath sounds. No rales or ronchi.    Abdominal: Abdomen soft, bowel sounds normoactive, no masses, no hepatosplenomegaly, non-tender, no bruits.     Musculoskeletal: No deformities, clubbing, cyanosis, erythema, or edema observed. There are no spinal abnormalities noted. Normal muscle strength and tone.     Neurological: No gross motor or sensory deficits noted, affect appropriate, oriented to time, person, place.     Skin: Warm and dry to the touch, no apparent skin lesions or masses noted.     Psychiatric: She has a normal mood and affect. Her behavior is normal. Judgment and thought content normal.         Procedures      Lab Results   Component  Value Date    WBC 8.03 04/10/2018    HGB 14.9 04/10/2018    HCT 44.0 04/10/2018    MCV 87.5 04/10/2018     04/10/2018     Lab Results   Component Value Date    GLUCOSE 77 04/10/2018    BUN 15 04/10/2018    CREATININE 0.68 04/10/2018    EGFRIFNONA 89 04/10/2018    BCR 22.1 04/10/2018    CO2 29.0 04/10/2018    CALCIUM 9.3 04/10/2018    ALBUMIN 4.10 04/10/2018    LABIL2 1.2 04/10/2018    AST 36 04/10/2018    ALT 31 04/10/2018     Lab Results   Component Value Date    CHOL 191 06/15/2017     Lab Results   Component Value Date    TRIG 150 06/15/2017    TRIG 228 (H) 03/30/2016    TRIG 198 09/12/2015     Lab Results   Component Value Date    HDL 44 (L) 06/15/2017    HDL 43 (L) 03/30/2016    HDL 38 (L) 01/20/2015     No components found for: LDLCALC  Lab Results   Component Value Date     06/15/2017     (H) 03/30/2016     (H) 03/30/2016     No results found for: HDLLDLRATIO  No components found for: CHOLHDL  Lab Results   Component Value Date    HGBA1C 6.2 (H) 03/31/2016     Lab Results   Component Value Date    TSH 1.36 09/29/2016           ASSESSMENT AND PLAN  Mrs. Means has episodes of vertigo/dizziness which appears to be secondary to inner ear problem.  Suspicion for a primary arrhythmia is low.  However since she did have fluttering in the chest is reported to proceed with a Holter monitor.  An echocardiogram to assess left ventricular and valvular function is being arranged.  Suspicion for ongoing ischemia is low.  She will be seen Dr. Grey later today.  I have suggested meclizine 25 mg up to twice a day.  She will discuss this with Dr. Grey.  Her present antiplatelet therapy with aspirin and Plavix, lipid-lowering therapy with Vytorin, antihypertensive therapy with metoprolol succinate have been continued.  If she has exertional chest pain in the future I will consider noninvasive testing to rule out progression of coronary artery disease.    Kimberly was seen today for follow-up and  dizziness.    Diagnoses and all orders for this visit:    Coronary arteriosclerosis  -     Holter Monitor - 24 Hour; Future  -     Adult Transthoracic Echo Complete W/ Cont if Necessary Per Protocol; Future    Essential hypertension  -     Holter Monitor - 24 Hour; Future  -     Adult Transthoracic Echo Complete W/ Cont if Necessary Per Protocol; Future    Mixed hyperlipidemia  -     Holter Monitor - 24 Hour; Future  -     Adult Transthoracic Echo Complete W/ Cont if Necessary Per Protocol; Future    Dizziness  -     Holter Monitor - 24 Hour; Future  -     Adult Transthoracic Echo Complete W/ Cont if Necessary Per Protocol; Future    Palpitation  -     Holter Monitor - 24 Hour; Future  -     Adult Transthoracic Echo Complete W/ Cont if Necessary Per Protocol; Future        Nallely Foster MD  4/11/2018  9:14 AM

## 2018-05-21 RX ORDER — ERGOCALCIFEROL 1.25 MG/1
CAPSULE ORAL
Qty: 30 CAPSULE | Refills: 0 | Status: SHIPPED | OUTPATIENT
Start: 2018-05-21 | End: 2018-10-26 | Stop reason: SDUPTHER

## 2018-06-18 ENCOUNTER — TELEPHONE (OUTPATIENT)
Dept: CARDIOLOGY | Facility: CLINIC | Age: 58
End: 2018-06-18

## 2018-09-22 DIAGNOSIS — F41.1 GENERALIZED ANXIETY DISORDER: ICD-10-CM

## 2018-09-24 ENCOUNTER — TELEPHONE (OUTPATIENT)
Dept: FAMILY MEDICINE CLINIC | Facility: CLINIC | Age: 58
End: 2018-09-24

## 2018-09-24 RX ORDER — ERGOCALCIFEROL 1.25 MG/1
CAPSULE ORAL
Qty: 23 CAPSULE | Refills: 0 | Status: SHIPPED | OUTPATIENT
Start: 2018-09-24 | End: 2018-10-26 | Stop reason: SDUPTHER

## 2018-09-24 RX ORDER — ESCITALOPRAM OXALATE 10 MG/1
TABLET ORAL
Qty: 30 TABLET | Refills: 0 | Status: SHIPPED | OUTPATIENT
Start: 2018-09-24 | End: 2018-11-05 | Stop reason: SDUPTHER

## 2018-09-24 NOTE — TELEPHONE ENCOUNTER
Pt called, she is out of Lexapro and Vitamin D2. Pt wanted to know if this could be sent to [facility].     Pt is out and can not go without this medication.   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         Pt wants to change pharmacy to [facility] please

## 2018-10-12 ENCOUNTER — LAB (OUTPATIENT)
Dept: LAB | Facility: HOSPITAL | Age: 58
End: 2018-10-12

## 2018-10-12 ENCOUNTER — OFFICE VISIT (OUTPATIENT)
Dept: CARDIOLOGY | Facility: CLINIC | Age: 58
End: 2018-10-12

## 2018-10-12 ENCOUNTER — APPOINTMENT (OUTPATIENT)
Dept: LAB | Facility: HOSPITAL | Age: 58
End: 2018-10-12

## 2018-10-12 VITALS
WEIGHT: 168 LBS | HEART RATE: 68 BPM | HEIGHT: 61 IN | SYSTOLIC BLOOD PRESSURE: 132 MMHG | OXYGEN SATURATION: 98 % | BODY MASS INDEX: 31.72 KG/M2 | DIASTOLIC BLOOD PRESSURE: 82 MMHG

## 2018-10-12 DIAGNOSIS — E55.9 VITAMIN D DEFICIENCY: ICD-10-CM

## 2018-10-12 DIAGNOSIS — I25.10 CORONARY ARTERIOSCLEROSIS: Primary | ICD-10-CM

## 2018-10-12 DIAGNOSIS — I25.10 CORONARY ARTERIOSCLEROSIS: ICD-10-CM

## 2018-10-12 DIAGNOSIS — I10 ESSENTIAL HYPERTENSION: ICD-10-CM

## 2018-10-12 DIAGNOSIS — E78.2 MIXED HYPERLIPIDEMIA: ICD-10-CM

## 2018-10-12 LAB
ALBUMIN SERPL-MCNC: 3.7 G/DL (ref 3.4–4.8)
ALBUMIN/GLOB SERPL: 1.1 G/DL (ref 1.1–1.8)
ALP SERPL-CCNC: 71 U/L (ref 38–126)
ALT SERPL W P-5'-P-CCNC: 27 U/L (ref 9–52)
ANION GAP SERPL CALCULATED.3IONS-SCNC: 6 MMOL/L (ref 5–15)
ARTICHOKE IGE QN: 118 MG/DL (ref 1–129)
AST SERPL-CCNC: 52 U/L (ref 14–36)
BASOPHILS # BLD AUTO: 0.03 10*3/MM3 (ref 0–0.2)
BASOPHILS NFR BLD AUTO: 0.5 % (ref 0–2)
BILIRUB SERPL-MCNC: 0.6 MG/DL (ref 0.2–1.3)
BUN BLD-MCNC: 12 MG/DL (ref 7–21)
BUN/CREAT SERPL: 16.7 (ref 7–25)
CALCIUM SPEC-SCNC: 8.9 MG/DL (ref 8.4–10.2)
CHLORIDE SERPL-SCNC: 105 MMOL/L (ref 95–110)
CHOLEST SERPL-MCNC: 218 MG/DL (ref 0–199)
CO2 SERPL-SCNC: 28 MMOL/L (ref 22–31)
CREAT BLD-MCNC: 0.72 MG/DL (ref 0.5–1)
DEPRECATED RDW RBC AUTO: 42.9 FL (ref 36.4–46.3)
EOSINOPHIL # BLD AUTO: 0.11 10*3/MM3 (ref 0–0.7)
EOSINOPHIL NFR BLD AUTO: 1.8 % (ref 0–7)
ERYTHROCYTE [DISTWIDTH] IN BLOOD BY AUTOMATED COUNT: 13 % (ref 11.5–14.5)
GFR SERPL CREATININE-BSD FRML MDRD: 83 ML/MIN/1.73 (ref 45–104)
GLOBULIN UR ELPH-MCNC: 3.3 GM/DL (ref 2.3–3.5)
GLUCOSE BLD-MCNC: 86 MG/DL (ref 60–100)
HCT VFR BLD AUTO: 43.6 % (ref 35–45)
HDLC SERPL-MCNC: 46 MG/DL (ref 60–200)
HGB BLD-MCNC: 14.4 G/DL (ref 12–15.5)
IMM GRANULOCYTES # BLD: 0.02 10*3/MM3 (ref 0–0.02)
IMM GRANULOCYTES NFR BLD: 0.3 % (ref 0–0.5)
LDLC/HDLC SERPL: 2.77 {RATIO} (ref 0–3.22)
LYMPHOCYTES # BLD AUTO: 2.14 10*3/MM3 (ref 0.6–4.2)
LYMPHOCYTES NFR BLD AUTO: 34.7 % (ref 10–50)
MCH RBC QN AUTO: 29.8 PG (ref 26.5–34)
MCHC RBC AUTO-ENTMCNC: 33 G/DL (ref 31.4–36)
MCV RBC AUTO: 90.3 FL (ref 80–98)
MONOCYTES # BLD AUTO: 0.35 10*3/MM3 (ref 0–0.9)
MONOCYTES NFR BLD AUTO: 5.7 % (ref 0–12)
NEUTROPHILS # BLD AUTO: 3.52 10*3/MM3 (ref 2–8.6)
NEUTROPHILS NFR BLD AUTO: 57 % (ref 37–80)
PLATELET # BLD AUTO: 244 10*3/MM3 (ref 150–450)
PMV BLD AUTO: 9.5 FL (ref 8–12)
POTASSIUM BLD-SCNC: 4 MMOL/L (ref 3.5–5.1)
PROT SERPL-MCNC: 7 G/DL (ref 6.3–8.6)
RBC # BLD AUTO: 4.83 10*6/MM3 (ref 3.77–5.16)
SODIUM BLD-SCNC: 139 MMOL/L (ref 137–145)
TRIGL SERPL-MCNC: 222 MG/DL (ref 20–199)
WBC NRBC COR # BLD: 6.17 10*3/MM3 (ref 3.2–9.8)

## 2018-10-12 PROCEDURE — 85025 COMPLETE CBC W/AUTO DIFF WBC: CPT

## 2018-10-12 PROCEDURE — 36415 COLL VENOUS BLD VENIPUNCTURE: CPT

## 2018-10-12 PROCEDURE — 82652 VIT D 1 25-DIHYDROXY: CPT

## 2018-10-12 PROCEDURE — 99214 OFFICE O/P EST MOD 30 MIN: CPT | Performed by: INTERNAL MEDICINE

## 2018-10-12 PROCEDURE — 80061 LIPID PANEL: CPT

## 2018-10-12 PROCEDURE — 80053 COMPREHEN METABOLIC PANEL: CPT

## 2018-10-12 NOTE — PROGRESS NOTES
"Kimberly Means  58 y.o. female    10/12/2018  1. Coronary arteriosclerosis    2. Essential hypertension    3. Mixed hyperlipidemia    4. Vitamin D deficiency        History of Present Illness    Mrs. Means is here for follow-up of her above stated problems.  She has done well with regards to her heart with no chest pain, shortness of breath, palpitation, dizziness or syncope.  She has been watching her diet very closely and has done remarkably well following gastric sleeve surgery.  Blood pressure was in the normal range.        SUBJECTIVE    Allergies   Allergen Reactions   • Kiwi Extract Anaphylaxis   • Lortab [Hydrocodone-Acetaminophen] Confusion and Hallucinations   • Paxil [Paroxetine] Other (See Comments)     \"MESSES WITH MY MIND\"   • Dilaudid [Hydromorphone] Other (See Comments)     Rapid heart rate  Rapid heart rate   • Latex Rash   • Morphine And Related Itching   • Sulfa Antibiotics Hives and Rash         Past Medical History:   Diagnosis Date   • Abdominal pain    • Acute bronchitis    • Acute left otitis media    • Acute otitis externa    • Allergic rhinitis     Due to pollen   • Ankle pain    • Asthma    • Astigmatism    • Bacterial pneumonia 12/02/2015   • Contact dermatitis    • Coronary arteriosclerosis     stent to LAD 2006      • Cough    • Depressive disorder     pt states she has not suffered from depression, takes lexapro for anxiety   • Diarrhea    • Dysuria    • Encounter for Papanicolaou smear of cervix 06/01/2009   • Epigastric pain    • Epistaxis    • Essential hypertension    • Fatigue    • Fever    • Functional diarrhea    • Gastroenteritis    • Generalized anxiety disorder 01/2015    given samples of lexapro.   • Heel pain    • Hematochezia    • Hernia of abdominal wall 05/13/2016    incarcerated- primarily repaired. Now recurrent      • History of bone density study 05/27/2014   • History of colonoscopy     diagnostic (screening) 80586- Normal colonoscopy.;  Last Performed: 12/10/2015 "   • History of esophagogastroduodenoscopy     epigastric pain, gastritis without bleeding,gastroesophageal reflux without esophagitis;  Last Performed: 01/06/1999   • History of mammogram 06/29/2016   • History of screening mammography    • Hyperglycemia    • Hyperlipidemia    • Impaired glucose tolerance    • Irritable bowel syndrome    • Measles    • Mechanical complication of internal orthopedic device (CMS/[facility])    • Migraine    • Morbid obesity due to excess calories (CMS/[facility])    • Mumps    • Myopia    • Nausea    • Need for DTaP vaccine 01/17/2015   • Retinal tear    • Spasm of back muscles    • Sprain of ankle    • Umbilical hernia    • Upper respiratory infection    • Urinary tract infectious disease    • Ventral hernia     Open ventral hernioplasty. Incarcerated ventral hernia;  Last Performed: 07/17/2012   • Vitamin D deficiency    • Vitreous detachment    • Vitreous opacities          Past Surgical History:   Procedure Laterality Date   • CERVICAL CONIZATION      stress urinary incontinence,endocervical dysplasia;  Last Performed: 09/05/1989   • CHOLECYSTECTOMY  08/26/1993    Laparoscopic   • COLONOSCOPY N/A 2/17/2017    Procedure: COLONOSCOPY;  Surgeon: Young Villegas MD;  Location: [facility] ENDOSCOPY;  Service:    • CORONARY ANGIOPLASTY      stent to LAD;  Last Performed: 2006   • CORONARY ANGIOPLASTY WITH STENT PLACEMENT     • EPIGASTRIC HERNIA REPAIR  02/02/1973   • GASTRIC SLEEVE LAPAROSCOPIC  11/08/2016   • HYSTERECTOMY  09/05/1989    mmk urethropexy   • TONSILLECTOMY      postoperative tonsillectomy,transfixed bleeding vessel, right tonsillar fossa;  Last Performed: 01/05/1974   • VAGINAL DELIVERY      x 2         Family History   Problem Relation Age of Onset   • Thyroid disease Mother    • Diabetes type II Mother    • Long QT syndrome Mother    • Hypothyroidism Mother    • Thyroid disease Father    • Coronary artery disease Father    • Tremor Father    • Diabetes Father    • COPD Father    •  Coronary artery disease Other    • Diabetes Other    • Hypertension Other          Social History     Social History   • Marital status:      Spouse name: Milla   • Number of children: 2   • Years of education: N/A     Occupational History   •  [employer]     Social History Main Topics   • Smoking status: Never Smoker   • Smokeless tobacco: Never Used   • Alcohol use No   • Drug use: No   • Sexual activity: Yes     Partners: Male     Birth control/ protection: Post-menopausal, Surgical     Other Topics Concern   • Not on file     Social History Narrative   • No narrative on file         Current Outpatient Prescriptions   Medication Sig Dispense Refill   • albuterol (PROVENTIL HFA;VENTOLIN HFA) 108 (90 BASE) MCG/ACT inhaler Inhale 2 puffs every 4 (four) hours as needed for wheezing.     • ALPRAZolam (XANAX) 0.5 MG tablet Take 0.5 mg by mouth. 2-3 times daily as needed for anxiety     • aspirin 81 MG tablet Take 81 mg by mouth every night.     • butalbital-aspirin-caffeine (FIORINAL) -40 MG capsule Take 1 capsule by mouth every 4 (four) hours as needed for headaches.     • clopidogrel (PLAVIX) 75 MG tablet Take 1 tablet by mouth Daily. 90 tablet 3   • cyanocobalamin 1000 MCG/ML injection Inject 1 mL under the skin Every 28 (Twenty-Eight) Days. 1 mL 5   • escitalopram (LEXAPRO) 10 MG tablet TAKE ONE TABLET BY MOUTH DAILY 30 tablet 0   • ezetimibe-simvastatin (VYTORIN) 10-40 MG per tablet Take 1 tablet by mouth Every Night. 30 tablet 5   • fluticasone (FLONASE) 50 MCG/ACT nasal spray 2 sprays into each nostril Daily. Administer 2 sprays in each nostril for each dose. 16 g 5   • metoprolol succinate XL (TOPROL-XL) 50 MG 24 hr tablet Take 1 tablet by mouth Daily. 90 tablet 3   • promethazine (PHENERGAN) 25 MG tablet Take 1 tablet by mouth Every 6 (Six) Hours As Needed for Nausea or Vomiting. 30 tablet 0   • vitamin D (ERGOCALCIFEROL) 98820 units capsule capsule 1 capsule Monday and  "Friday 30 capsule 2   • vitamin D (ERGOCALCIFEROL) 20215 units capsule capsule TAKE 1 CAPSULE BY MOUTH ON MONDAY AND FRIDAY 30 capsule 0   • vitamin D (ERGOCALCIFEROL) 73051 units capsule capsule TAKE 1 CAPSULE BY MOUTH ON MONDAY AND FRIDAY 23 capsule 0     No current facility-administered medications for this visit.          OBJECTIVE    /82   Pulse 68   Ht 154.9 cm (60.98\")   Wt 76.2 kg (168 lb)   LMP 04/11/1989 (Within Months)   SpO2 98%   BMI 31.76 kg/m²         Review of Systems     Constitutional:  Denies recent weight loss, weight gain, fever or chills, no change in exercise tolerance     HENT:  Denies any hearing loss, epistaxis, hoarseness, or difficulty speaking.     Eyes: Wears eyeglasses or contact lenses     Respiratory:  Denies dyspnea with exertion,no cough, wheezing, or hemoptysis.     Cardiovascular: Negative for palpations, chest pain, orthopnea, PND, peripheral edema, syncope, or claudication.     Gastrointestinal:  Denies change in bowel habits, dyspepsia, ulcer disease, hematochezia, or melena.     Endocrine: Negative for cold intolerance, heat intolerance, polydipsia, polyphagia and polyuria.     Genitourinary: Negative.      Musculoskeletal: Denies any history of arthritic symptoms or back problems     Skin:  Denies any change in hair or nails, rashes, or skin lesions.     Allergic/Immunologic: Negative.  Negative for environmental allergies, food allergies and immunocompromised state.     Neurological:  Denies any history of recurrent headaches, strokes, TIA, or seizure disorder.     Hematological: Denies any food allergies, seasonal allergies, bleeding disorders, or lymphadenopathy.     Psychiatric/Behavioral: history of depression    Physical Exam     Constitutional: Cooperative, alert and oriented,  in no acute distress.     HENT:   Head: Normocephalic, normal hair patterns, no masses or tenderness.  Ears, Nose, and Throat: No gross abnormalities. No pallor or cyanosis.   Eyes: " EOMS intact, PERRL, conjunctivae and lids unremarkable. Fundoscopic exam and visual fields not performed.   Neck: No palpable masses or adenopathy, no thyromegaly, no JVD, carotid pulses are full and equal bilaterally and without  Bruits.     Cardiovascular: Regular rhythm, S1 and S2 normal, no S3 or S4.  No murmurs, gallops, or rubs detected.     Pulmonary/Chest: Chest: normal symmetry,  normal respiratory excursion, no intercostal retraction, no use of accessory muscles.            Pulmonary: Normal breath sounds. No rales or ronchi.    Abdominal: Abdomen soft, bowel sounds normoactive, no masses, no hepatosplenomegaly, non-tender, no bruits.     Musculoskeletal: No deformities, clubbing, cyanosis, erythema, or edema observed.     Neurological: No gross motor or sensory deficits noted, affect appropriate, oriented to time, person, place.     Skin: Warm and dry to the touch, no apparent skin lesions or masses noted.     Psychiatric: She has a normal mood and affect. Her behavior is normal. Judgment and thought content normal.         Procedures      Lab Results   Component Value Date    WBC 8.03 04/10/2018    HGB 14.9 04/10/2018    HCT 44.0 04/10/2018    MCV 87.5 04/10/2018     04/10/2018     Lab Results   Component Value Date    GLUCOSE 77 04/10/2018    BUN 15 04/10/2018    CREATININE 0.68 04/10/2018    EGFRIFNONA 89 04/10/2018    BCR 22.1 04/10/2018    CO2 29.0 04/10/2018    CALCIUM 9.3 04/10/2018    ALBUMIN 4.10 04/10/2018    AST 36 04/10/2018    ALT 31 04/10/2018     Lab Results   Component Value Date    CHOL 191 06/15/2017     Lab Results   Component Value Date    TRIG 150 06/15/2017    TRIG 228 (H) 03/30/2016    TRIG 198 09/12/2015     Lab Results   Component Value Date    HDL 44 (L) 06/15/2017    HDL 43 (L) 03/30/2016    HDL 38 (L) 01/20/2015     No components found for: LDLCALC  Lab Results   Component Value Date     06/15/2017     (H) 03/30/2016     (H) 03/30/2016     No results  found for: HDLLDLRATIO  No components found for: CHOLHDL  Lab Results   Component Value Date    HGBA1C 6.2 (H) 03/31/2016     Lab Results   Component Value Date    TSH 1.36 09/29/2016           ASSESSMENT AND PLAN  Mrs. Means is stable with regards to her heart with no evidence of angina or arrhythmia.  I have continued antiplatelet therapy with aspirin and Plavix, lipid-lowering therapy with Vytorin, antihypertensive therapy with metoprolol succinate.  Lab tests indicated below have been ordered.    Kimberly was seen today for follow-up.    Diagnoses and all orders for this visit:    Coronary arteriosclerosis  -     Lipid Panel; Future  -     CBC & Differential; Future  -     Comprehensive Metabolic Panel; Future  -     Vitamin D 1,25 Dihydroxy; Future    Essential hypertension  -     Lipid Panel; Future  -     CBC & Differential; Future  -     Comprehensive Metabolic Panel; Future  -     Vitamin D 1,25 Dihydroxy; Future    Mixed hyperlipidemia  -     Lipid Panel; Future  -     CBC & Differential; Future  -     Comprehensive Metabolic Panel; Future  -     Vitamin D 1,25 Dihydroxy; Future    Vitamin D deficiency  -     Lipid Panel; Future  -     CBC & Differential; Future  -     Comprehensive Metabolic Panel; Future  -     Vitamin D 1,25 Dihydroxy; Future        Nallely Foster MD  10/12/2018  11:38 AM

## 2018-10-15 LAB — 1,25(OH)2D3 SERPL-MCNC: 69.7 PG/ML (ref 19.9–79.3)

## 2018-10-26 ENCOUNTER — OFFICE VISIT (OUTPATIENT)
Dept: FAMILY MEDICINE CLINIC | Facility: CLINIC | Age: 58
End: 2018-10-26

## 2018-10-26 VITALS
SYSTOLIC BLOOD PRESSURE: 116 MMHG | DIASTOLIC BLOOD PRESSURE: 80 MMHG | WEIGHT: 169.6 LBS | BODY MASS INDEX: 32.02 KG/M2 | HEIGHT: 61 IN

## 2018-10-26 DIAGNOSIS — I10 ESSENTIAL HYPERTENSION: Primary | ICD-10-CM

## 2018-10-26 DIAGNOSIS — R42 DIZZINESS: ICD-10-CM

## 2018-10-26 DIAGNOSIS — F32.A DEPRESSIVE DISORDER: ICD-10-CM

## 2018-10-26 DIAGNOSIS — Z76.89 ENCOUNTER TO ESTABLISH CARE: ICD-10-CM

## 2018-10-26 DIAGNOSIS — E55.9 VITAMIN D DEFICIENCY: ICD-10-CM

## 2018-10-26 DIAGNOSIS — Z12.39 SCREENING FOR BREAST CANCER: ICD-10-CM

## 2018-10-26 DIAGNOSIS — E78.2 MIXED HYPERLIPIDEMIA: ICD-10-CM

## 2018-10-26 PROCEDURE — 99214 OFFICE O/P EST MOD 30 MIN: CPT | Performed by: NURSE PRACTITIONER

## 2018-10-26 RX ORDER — MECLIZINE HCL 12.5 MG/1
12.5 TABLET ORAL 3 TIMES DAILY PRN
Qty: 60 TABLET | Refills: 2 | Status: SHIPPED | OUTPATIENT
Start: 2018-10-26 | End: 2018-11-05

## 2018-11-02 ENCOUNTER — APPOINTMENT (OUTPATIENT)
Dept: PHYSICAL THERAPY | Facility: HOSPITAL | Age: 58
End: 2018-11-02

## 2018-11-05 ENCOUNTER — TELEPHONE (OUTPATIENT)
Dept: FAMILY MEDICINE CLINIC | Facility: CLINIC | Age: 58
End: 2018-11-05

## 2018-11-05 ENCOUNTER — APPOINTMENT (OUTPATIENT)
Dept: LAB | Facility: HOSPITAL | Age: 58
End: 2018-11-05

## 2018-11-05 DIAGNOSIS — F41.1 GENERALIZED ANXIETY DISORDER: ICD-10-CM

## 2018-11-05 PROCEDURE — 84443 ASSAY THYROID STIM HORMONE: CPT | Performed by: FAMILY MEDICINE

## 2018-11-05 RX ORDER — ESCITALOPRAM OXALATE 10 MG/1
10 TABLET ORAL DAILY
Qty: 30 TABLET | Refills: 5 | Status: SHIPPED | OUTPATIENT
Start: 2018-11-05 | End: 2019-04-26 | Stop reason: SDUPTHER

## 2018-11-07 RX ORDER — OMEPRAZOLE 20 MG/1
CAPSULE, DELAYED RELEASE ORAL
Qty: 30 CAPSULE | Refills: 3 | Status: SHIPPED | OUTPATIENT
Start: 2018-11-07 | End: 2018-11-09 | Stop reason: SDUPTHER

## 2018-11-09 RX ORDER — OMEPRAZOLE 20 MG/1
20 CAPSULE, DELAYED RELEASE ORAL DAILY
Qty: 30 CAPSULE | Refills: 3 | Status: SHIPPED | OUTPATIENT
Start: 2018-11-09 | End: 2019-04-26 | Stop reason: SDUPTHER

## 2018-11-12 DIAGNOSIS — Z13.820 OSTEOPOROSIS SCREENING: Primary | ICD-10-CM

## 2018-12-28 DIAGNOSIS — Z98.890 STATUS POST GASTRIC SURGERY: ICD-10-CM

## 2018-12-28 RX ORDER — CYANOCOBALAMIN 1000 UG/ML
INJECTION, SOLUTION INTRAMUSCULAR; SUBCUTANEOUS
Qty: 1 ML | Refills: 5 | Status: SHIPPED | OUTPATIENT
Start: 2018-12-28 | End: 2019-04-26 | Stop reason: SDUPTHER

## 2019-01-10 RX ORDER — METOPROLOL SUCCINATE 50 MG/1
TABLET, EXTENDED RELEASE ORAL
Qty: 90 TABLET | Refills: 1 | Status: SHIPPED | OUTPATIENT
Start: 2019-01-10 | End: 2019-04-16

## 2019-01-11 RX ORDER — ERGOCALCIFEROL 1.25 MG/1
CAPSULE ORAL
Qty: 23 CAPSULE | Refills: 0 | Status: SHIPPED | OUTPATIENT
Start: 2019-01-11 | End: 2019-04-26 | Stop reason: SDUPTHER

## 2019-01-13 ENCOUNTER — HOSPITAL ENCOUNTER (OUTPATIENT)
Dept: CT IMAGING | Facility: HOSPITAL | Age: 59
Discharge: HOME | End: 2019-01-13
Admitting: NURSE PRACTITIONER

## 2019-01-13 DIAGNOSIS — R10.9 FLANK PAIN: ICD-10-CM

## 2019-01-13 PROBLEM — N39.0 URINARY TRACT INFECTION IN FEMALE: Status: ACTIVE | Noted: 2019-01-13

## 2019-01-13 PROCEDURE — 74176 CT ABD & PELVIS W/O CONTRAST: CPT

## 2019-01-21 DIAGNOSIS — I25.10 CORONARY ARTERIOSCLEROSIS: ICD-10-CM

## 2019-01-21 RX ORDER — CLOPIDOGREL BISULFATE 75 MG/1
75 TABLET ORAL DAILY
Qty: 90 TABLET | Refills: 3 | Status: SHIPPED | OUTPATIENT
Start: 2019-01-21 | End: 2019-04-26 | Stop reason: SDUPTHER

## 2019-03-21 ENCOUNTER — OFFICE VISIT (OUTPATIENT)
Dept: OTOLARYNGOLOGY | Facility: CLINIC | Age: 59
End: 2019-03-21

## 2019-03-21 VITALS
SYSTOLIC BLOOD PRESSURE: 110 MMHG | BODY MASS INDEX: 31.36 KG/M2 | DIASTOLIC BLOOD PRESSURE: 80 MMHG | HEIGHT: 62 IN | WEIGHT: 170.4 LBS

## 2019-03-21 DIAGNOSIS — H81.20 VESTIBULAR NEURONITIS, UNSPECIFIED LATERALITY: Primary | ICD-10-CM

## 2019-03-21 PROCEDURE — 99203 OFFICE O/P NEW LOW 30 MIN: CPT | Performed by: OTOLARYNGOLOGY

## 2019-03-21 RX ORDER — PREDNISONE 20 MG/1
TABLET ORAL
Qty: 12 TABLET | Refills: 0 | Status: SHIPPED | OUTPATIENT
Start: 2019-03-21 | End: 2019-04-11

## 2019-03-21 NOTE — PROGRESS NOTES
"Subjective   Kimberly Means is a 58 y.o. female.     History of Present Illness   Patient is here for evaluation of dizziness.  States she initially began having symptoms 8-9 months ago.  Associated symptoms included nausea.  Dizziness was present whether she moved or not.  Symptoms improved but never completely resolved.  3 days ago had a flareup of more intense symptoms.  Again had associated nausea.  No change in hearing with the dizziness.  No otorrhea.  No otalgia.  Does occasionally have a brief abnormal olfactory sensation.  No vision change.  Was given meclizine which she states makes her sleepy and less nauseated.  No previous otologic surgery.      The following portions of the patient's history were reviewed and updated as appropriate: allergies, current medications, past family history, past medical history, past social history, past surgical history and problem list.      Kimberly Means reports that she has never smoked. She has never used smokeless tobacco. She reports that she does not drink alcohol or use drugs.  Patient is not a tobacco user and has not been counseled for use of tobacco products    Family History   Problem Relation Age of Onset   • Thyroid disease Mother    • Diabetes type II Mother    • Long QT syndrome Mother    • Hypothyroidism Mother    • Breast cancer Mother    • Thyroid disease Father    • Coronary artery disease Father    • Tremor Father    • Diabetes Father    • COPD Father    • Coronary artery disease Other    • Diabetes Other    • Hypertension Other        Allergies   Allergen Reactions   • Kiwi Extract Anaphylaxis   • Lortab [Hydrocodone-Acetaminophen] Confusion and Hallucinations   • Paxil [Paroxetine] Other (See Comments)     \"MESSES WITH MY MIND\"   • Dilaudid [Hydromorphone] Other (See Comments)     Rapid heart rate  Rapid heart rate   • Latex Rash   • Morphine And Related Itching   • Sulfa Antibiotics Hives and Rash         Current Outpatient Medications:   •  " ALPRAZolam (XANAX) 0.5 MG tablet, Take 0.5 mg by mouth. 2-3 times daily as needed for anxiety, Disp: , Rfl:   •  aspirin 81 MG tablet, Take 81 mg by mouth every night., Disp: , Rfl:   •  butalbital-aspirin-caffeine (FIORINAL) -40 MG capsule, Take 1 capsule by mouth every 4 (four) hours as needed for headaches., Disp: , Rfl:   •  clopidogrel (PLAVIX) 75 MG tablet, Take 1 tablet by mouth Daily., Disp: 90 tablet, Rfl: 3  •  cyanocobalamin 1000 MCG/ML injection, ADMINISTER 1 ML UNDER THE SKIN EVERY 28 DAYS, Disp: 1 mL, Rfl: 5  •  escitalopram (LEXAPRO) 10 MG tablet, Take 1 tablet by mouth Daily., Disp: 30 tablet, Rfl: 5  •  fluticasone (FLONASE) 50 MCG/ACT nasal spray, 2 sprays into each nostril Daily. Administer 2 sprays in each nostril for each dose., Disp: 16 g, Rfl: 5  •  meclizine (ANTIVERT) 25 MG tablet, Take 1 tablet by mouth 3 (Three) Times a Day As Needed for dizziness., Disp: 30 tablet, Rfl: 0  •  metoprolol succinate XL (TOPROL-XL) 50 MG 24 hr tablet, TAKE ONE TABLET BY MOUTH DAILY, Disp: 90 tablet, Rfl: 1  •  omeprazole (priLOSEC) 20 MG capsule, Take 1 capsule by mouth Daily., Disp: 30 capsule, Rfl: 3  •  vitamin D (ERGOCALCIFEROL) 50819 units capsule capsule, TAKE ONE CAPSULE BY MOUTH  ON MONDAYS AND FRIDAYS, Disp: 23 capsule, Rfl: 0    Past Medical History:   Diagnosis Date   • Abdominal pain    • Acute bronchitis    • Acute left otitis media    • Acute otitis externa    • Allergic rhinitis     Due to pollen   • Ankle pain    • Asthma    • Astigmatism    • Bacterial pneumonia 12/02/2015   • Contact dermatitis    • Coronary arteriosclerosis     stent to LAD 2006      • Cough    • Depressive disorder     pt states she has not suffered from depression, takes lexapro for anxiety   • Diarrhea    • Dysuria    • Encounter for Papanicolaou smear of cervix 06/01/2009   • Epigastric pain    • Epistaxis    • Essential hypertension    • Fatigue    • Fever    • Functional diarrhea    • Gastroenteritis    •  Generalized anxiety disorder 01/2015    given samples of lexapro.   • Heel pain    • Hematochezia    • Hernia of abdominal wall 05/13/2016    incarcerated- primarily repaired. Now recurrent      • History of bone density study 05/27/2014   • History of colonoscopy     diagnostic (screening) 26346- Normal colonoscopy.;  Last Performed: 12/10/2015   • History of esophagogastroduodenoscopy     epigastric pain, gastritis without bleeding,gastroesophageal reflux without esophagitis;  Last Performed: 01/06/1999   • History of mammogram 06/29/2016   • History of screening mammography    • Hyperglycemia    • Hyperlipidemia    • Impaired glucose tolerance    • Irritable bowel syndrome    • Measles    • Mechanical complication of internal orthopedic device (CMS/HCC)    • Migraine    • Morbid obesity due to excess calories (CMS/HCC)    • Mumps    • Myopia    • Nausea    • Need for DTaP vaccine 01/17/2015   • Retinal tear    • Spasm of back muscles    • Sprain of ankle    • Umbilical hernia    • Upper respiratory infection    • Urinary tract infectious disease    • Ventral hernia     Open ventral hernioplasty. Incarcerated ventral hernia;  Last Performed: 07/17/2012   • Vitamin D deficiency    • Vitreous detachment    • Vitreous opacities          Review of Systems   HENT: Negative for hearing loss.    Neurological: Positive for dizziness and headaches.   All other systems reviewed and are negative.          Objective   Physical Exam  General: Well-developed well-nourished female in no acute distress.  Alert and oriented x-3. Head: Normocephalic. Face: Symmetrical strength and appearance. PERRL.  Fine nystagmus on lateral gaze, low amplitude.  Voice:Strong. Speech:Fluent  Ears: External ears no deformity, canals no discharge, tympanic membranes intact clear and mobile bilaterally.  Nose: Nares show no discharge mass polyp or purulence.  Boggy mucosa is present.  No gross external deformity.  Septum: Midline  Oral cavity: Lips  and gums without lesions.  Tongue and floor of mouth without lesions.  Parotid and submandibular ducts unobstructed.  No mucosal lesions on the buccal mucosa or vestibule of the mouth.  Pharynx: No erythema exudate mass or ulcer  Neck: No lymphadenopathy.  No thyromegaly.  Trachea and larynx midline.  No masses in the parotid or submandibular glands.  Carlos-Hallpike maneuvers do not produce any rotatory nystagmus.  She says having her head to the right made her symptoms somewhat worse and arising from supine made her symptoms somewhat worse but her symptoms did not worsen with her head to the left.      Assessment/Plan   Kimberly was seen today for dizziness.    Diagnoses and all orders for this visit:    Vestibular neuronitis, unspecified laterality      Plan: Clinically this is most consistent with vestibular neuronitis, although with her occasional olfactory symptoms, vestibular migraine would be in the differential.  Will treat with prednisone 60 mg taper down to nothing over 6 days.  May continue to use the meclizine as needed for symptoms.  Reevaluate in 3 weeks.  If no better consider vestibular testing.

## 2019-03-22 RX ORDER — EZETIMIBE AND SIMVASTATIN 10; 40 MG/1; MG/1
1 TABLET ORAL NIGHTLY
Qty: 30 TABLET | Refills: 6 | Status: SHIPPED | OUTPATIENT
Start: 2019-03-22 | End: 2019-10-25 | Stop reason: SDUPTHER

## 2019-04-11 ENCOUNTER — OFFICE VISIT (OUTPATIENT)
Dept: OTOLARYNGOLOGY | Facility: CLINIC | Age: 59
End: 2019-04-11

## 2019-04-11 VITALS — HEIGHT: 62 IN | BODY MASS INDEX: 31.28 KG/M2 | WEIGHT: 170 LBS

## 2019-04-11 DIAGNOSIS — J31.0 CHRONIC RHINITIS: ICD-10-CM

## 2019-04-11 DIAGNOSIS — H81.20 VESTIBULAR NEURONITIS, UNSPECIFIED LATERALITY: Primary | ICD-10-CM

## 2019-04-11 PROCEDURE — 99213 OFFICE O/P EST LOW 20 MIN: CPT | Performed by: OTOLARYNGOLOGY

## 2019-04-11 RX ORDER — FLUTICASONE PROPIONATE 50 MCG
2 SPRAY, SUSPENSION (ML) NASAL DAILY
Qty: 16 G | Refills: 5 | Status: SHIPPED | OUTPATIENT
Start: 2019-04-11 | End: 2021-02-04 | Stop reason: SDUPTHER

## 2019-04-15 NOTE — PROGRESS NOTES
Subjective   Kimberly Means is a 58 y.o. female.       History of Present Illness   Patient was seen recently with recurrent dizziness that was thought to be vestibular neuronitis.  Was also having brief olfactory hallucinations that suggested possibility of migraine.  Was treated with prednisone.  Reports she is significantly improved.  She says she still has some occasional brief olfactory hallucinations.  No significant dizziness.  She does have chronic rhinitis for which she utilizes Flonase and she needs a refill of this.    The following portions of the patient's history were reviewed and updated as appropriate: allergies, current medications, past family history, past medical history, past social history, past surgical history and problem list.     reports that she has never smoked. She has never used smokeless tobacco. She reports that she does not drink alcohol or use drugs.   Patient is not a tobacco user and has not been counseled for use of tobacco products      Review of Systems   Constitutional: Negative for fever.           Objective   Physical Exam  General: Well-developed well-nourished female in no acute distress.  Alert and oriented x-3.  PERRL. EOMI. Voice:Strong. Speech:Fluent  Ears: External ears no deformity, canals no discharge, tympanic membranes intact clear and mobile bilaterally.  Nose: Nares show no discharge mass polyp or purulence.  Boggy mucosa is present.  No gross external deformity.  Septum: Midline  Oral cavity: Lips and gums without lesions.  Tongue and floor of mouth without lesions.  Parotid and submandibular ducts unobstructed.  No mucosal lesions on the buccal mucosa or vestibule of the mouth.  Pharynx: No erythema exudate mass or ulcer  Neck: No lymphadenopathy.  No thyromegaly.  Trachea and larynx midline.  No masses in the parotid or submandibular glands.    Assessment/Plan   Kimberly was seen today for follow-up.    Diagnoses and all orders for this visit:    Vestibular  neuronitis, unspecified laterality    Chronic rhinitis    Other orders  -     fluticasone (FLONASE) 50 MCG/ACT nasal spray; 2 sprays into the nostril(s) as directed by provider Daily. Administer 2 sprays in each nostril for each dose.        Plan: Flonase refilled.  Since she is otherwise improved we will just observe for now.  If her dizziness recurs will proceed with vestibular testing.  If the olfactory hallucinations continue/worsen consider neurologic evaluation.

## 2019-04-16 ENCOUNTER — LAB (OUTPATIENT)
Dept: LAB | Facility: HOSPITAL | Age: 59
End: 2019-04-16

## 2019-04-16 ENCOUNTER — OFFICE VISIT (OUTPATIENT)
Dept: CARDIOLOGY | Facility: CLINIC | Age: 59
End: 2019-04-16

## 2019-04-16 VITALS
HEIGHT: 62 IN | BODY MASS INDEX: 32.46 KG/M2 | OXYGEN SATURATION: 98 % | HEART RATE: 50 BPM | SYSTOLIC BLOOD PRESSURE: 110 MMHG | DIASTOLIC BLOOD PRESSURE: 78 MMHG | WEIGHT: 176.4 LBS

## 2019-04-16 DIAGNOSIS — I10 ESSENTIAL HYPERTENSION: ICD-10-CM

## 2019-04-16 DIAGNOSIS — E78.2 MIXED HYPERLIPIDEMIA: ICD-10-CM

## 2019-04-16 DIAGNOSIS — I25.10 CORONARY ARTERIOSCLEROSIS: ICD-10-CM

## 2019-04-16 DIAGNOSIS — I25.10 CORONARY ARTERIOSCLEROSIS: Primary | ICD-10-CM

## 2019-04-16 LAB
ALBUMIN SERPL-MCNC: 4.1 G/DL (ref 3.5–5.2)
ALBUMIN/GLOB SERPL: 1.4 G/DL
ALP SERPL-CCNC: 64 U/L (ref 39–117)
ALT SERPL W P-5'-P-CCNC: 14 U/L (ref 1–33)
ANION GAP SERPL CALCULATED.3IONS-SCNC: 11.7 MMOL/L
AST SERPL-CCNC: 14 U/L (ref 1–32)
BASOPHILS # BLD AUTO: 0.04 10*3/MM3 (ref 0–0.2)
BASOPHILS NFR BLD AUTO: 0.6 % (ref 0–1.5)
BILIRUB SERPL-MCNC: 0.4 MG/DL (ref 0.2–1.2)
BUN BLD-MCNC: 14 MG/DL (ref 8–23)
BUN/CREAT SERPL: 20.3 (ref 7–25)
CALCIUM SPEC-SCNC: 8.9 MG/DL (ref 8.6–10.5)
CHLORIDE SERPL-SCNC: 104 MMOL/L (ref 98–107)
CHOLEST SERPL-MCNC: 218 MG/DL (ref 0–200)
CO2 SERPL-SCNC: 28.3 MMOL/L (ref 22–29)
CREAT BLD-MCNC: 0.69 MG/DL (ref 0.57–1)
DEPRECATED RDW RBC AUTO: 42.8 FL (ref 37–54)
EOSINOPHIL # BLD AUTO: 0.17 10*3/MM3 (ref 0–0.4)
EOSINOPHIL NFR BLD AUTO: 2.4 % (ref 0.3–6.2)
ERYTHROCYTE [DISTWIDTH] IN BLOOD BY AUTOMATED COUNT: 13.2 % (ref 12.3–15.4)
GFR SERPL CREATININE-BSD FRML MDRD: 87 ML/MIN/1.73
GLOBULIN UR ELPH-MCNC: 2.9 GM/DL
GLUCOSE BLD-MCNC: 89 MG/DL (ref 65–99)
HBA1C MFR BLD: 5.79 % (ref 4.8–5.6)
HCT VFR BLD AUTO: 43.9 % (ref 34–46.6)
HDLC SERPL-MCNC: 59 MG/DL (ref 40–60)
HGB BLD-MCNC: 14.1 G/DL (ref 12–15.9)
IMM GRANULOCYTES # BLD AUTO: 0.01 10*3/MM3 (ref 0–0.05)
IMM GRANULOCYTES NFR BLD AUTO: 0.1 % (ref 0–0.5)
LDLC SERPL CALC-MCNC: 120 MG/DL (ref 0–100)
LDLC/HDLC SERPL: 2.03 {RATIO}
LYMPHOCYTES # BLD AUTO: 2.89 10*3/MM3 (ref 0.7–3.1)
LYMPHOCYTES NFR BLD AUTO: 41.3 % (ref 19.6–45.3)
MCH RBC QN AUTO: 28.7 PG (ref 26.6–33)
MCHC RBC AUTO-ENTMCNC: 32.1 G/DL (ref 31.5–35.7)
MCV RBC AUTO: 89.4 FL (ref 79–97)
MONOCYTES # BLD AUTO: 0.44 10*3/MM3 (ref 0.1–0.9)
MONOCYTES NFR BLD AUTO: 6.3 % (ref 5–12)
NEUTROPHILS # BLD AUTO: 3.45 10*3/MM3 (ref 1.4–7)
NEUTROPHILS NFR BLD AUTO: 49.3 % (ref 42.7–76)
NRBC BLD AUTO-RTO: 0 /100 WBC (ref 0–0)
PLATELET # BLD AUTO: 241 10*3/MM3 (ref 140–450)
PMV BLD AUTO: 10.6 FL (ref 6–12)
POTASSIUM BLD-SCNC: 4.5 MMOL/L (ref 3.5–5.2)
PROT SERPL-MCNC: 7 G/DL (ref 6–8.5)
RBC # BLD AUTO: 4.91 10*6/MM3 (ref 3.77–5.28)
SODIUM BLD-SCNC: 144 MMOL/L (ref 136–145)
TRIGL SERPL-MCNC: 195 MG/DL (ref 0–150)
VLDLC SERPL-MCNC: 39 MG/DL (ref 5–40)
WBC NRBC COR # BLD: 7 10*3/MM3 (ref 3.4–10.8)

## 2019-04-16 PROCEDURE — 36415 COLL VENOUS BLD VENIPUNCTURE: CPT | Performed by: INTERNAL MEDICINE

## 2019-04-16 PROCEDURE — 99214 OFFICE O/P EST MOD 30 MIN: CPT | Performed by: INTERNAL MEDICINE

## 2019-04-16 PROCEDURE — 80061 LIPID PANEL: CPT

## 2019-04-16 PROCEDURE — 80053 COMPREHEN METABOLIC PANEL: CPT

## 2019-04-16 PROCEDURE — 83036 HEMOGLOBIN GLYCOSYLATED A1C: CPT | Performed by: INTERNAL MEDICINE

## 2019-04-16 PROCEDURE — 85025 COMPLETE CBC W/AUTO DIFF WBC: CPT

## 2019-04-16 RX ORDER — METOPROLOL SUCCINATE 50 MG/1
25 TABLET, EXTENDED RELEASE ORAL DAILY
Qty: 90 TABLET | Refills: 1
Start: 2019-04-16 | End: 2019-04-26 | Stop reason: SDUPTHER

## 2019-04-16 NOTE — PROGRESS NOTES
"Kimberly Means  58 y.o. female    04/16/2019  1. Coronary arteriosclerosis    2. Essential hypertension    3. Mixed hyperlipidemia        History of Present Illness  Kimberly Means is here for follow-up of her above-stated problems.  She is done well chest pain, shortness of breath, palpitation.  She has been watching her diet closely and did discuss exercise programs.  Blood pressure was in the normal range.  No signs of angina or congestive heart failure was noted.  Her LDL was noted to be elevated when checked in October 2018.  This will be reassessed.  She did report fatigue and wondered if this was related to her heart rate being slow.    SUBJECTIVE    Allergies   Allergen Reactions   • Kiwi Extract Anaphylaxis   • Lortab [Hydrocodone-Acetaminophen] Confusion and Hallucinations   • Paxil [Paroxetine] Other (See Comments)     \"MESSES WITH MY MIND\"   • Dilaudid [Hydromorphone] Other (See Comments)     Rapid heart rate  Rapid heart rate   • Latex Rash   • Morphine And Related Itching   • Sulfa Antibiotics Hives and Rash         Past Medical History:   Diagnosis Date   • Abdominal pain    • Acute bronchitis    • Acute left otitis media    • Acute otitis externa    • Allergic rhinitis     Due to pollen   • Ankle pain    • Asthma    • Astigmatism    • Bacterial pneumonia 12/02/2015   • Contact dermatitis    • Coronary arteriosclerosis     stent to LAD 2006      • Cough    • Depressive disorder     pt states she has not suffered from depression, takes lexapro for anxiety   • Diarrhea    • Dysuria    • Encounter for Papanicolaou smear of cervix 06/01/2009   • Epigastric pain    • Epistaxis    • Essential hypertension    • Fatigue    • Fever    • Functional diarrhea    • Gastroenteritis    • Generalized anxiety disorder 01/2015    given samples of lexapro.   • Heel pain    • Hematochezia    • Hernia of abdominal wall 05/13/2016    incarcerated- primarily repaired. Now recurrent      • History of bone density study " 05/27/2014   • History of colonoscopy     diagnostic (screening) 69589- Normal colonoscopy.;  Last Performed: 12/10/2015   • History of esophagogastroduodenoscopy     epigastric pain, gastritis without bleeding,gastroesophageal reflux without esophagitis;  Last Performed: 01/06/1999   • History of mammogram 06/29/2016   • History of screening mammography    • Hyperglycemia    • Hyperlipidemia    • Impaired glucose tolerance    • Irritable bowel syndrome    • Measles    • Mechanical complication of internal orthopedic device (CMS/[facility])    • Migraine    • Morbid obesity due to excess calories (CMS/[facility])    • Mumps    • Myopia    • Nausea    • Need for DTaP vaccine 01/17/2015   • Retinal tear    • Spasm of back muscles    • Sprain of ankle    • Umbilical hernia    • Upper respiratory infection    • Urinary tract infectious disease    • Ventral hernia     Open ventral hernioplasty. Incarcerated ventral hernia;  Last Performed: 07/17/2012   • Vitamin D deficiency    • Vitreous detachment    • Vitreous opacities          Past Surgical History:   Procedure Laterality Date   • CERVICAL CONIZATION      stress urinary incontinence,endocervical dysplasia;  Last Performed: 09/05/1989   • CHOLECYSTECTOMY  08/26/1993    Laparoscopic   • COLONOSCOPY N/A 2/17/2017    Procedure: COLONOSCOPY;  Surgeon: Young Villegas MD;  Location: [facility] ENDOSCOPY;  Service:    • CORONARY ANGIOPLASTY      stent to LAD;  Last Performed: 2006   • CORONARY ANGIOPLASTY WITH STENT PLACEMENT     • EPIGASTRIC HERNIA REPAIR  02/02/1973   • GASTRIC SLEEVE LAPAROSCOPIC  11/08/2016   • HYSTERECTOMY  09/05/1989    [facility]k urethropexy   • TONSILLECTOMY      postoperative tonsillectomy,transfixed bleeding vessel, right tonsillar fossa;  Last Performed: 01/05/1974   • VAGINAL DELIVERY      x 2         Family History   Problem Relation Age of Onset   • Thyroid disease Mother    • Diabetes type II Mother    • Long QT syndrome Mother    • Hypothyroidism Mother    • Breast  cancer Mother    • Thyroid disease Father    • Coronary artery disease Father    • Tremor Father    • Diabetes Father    • COPD Father    • Coronary artery disease Other    • Diabetes Other    • Hypertension Other          Social History     Socioeconomic History   • Marital status:      Spouse name: Milla   • Number of children: 2   • Years of education: Not on file   • Highest education level: Not on file   Occupational History   • Occupation:      Employer: [employer]   Tobacco Use   • Smoking status: Never Smoker   • Smokeless tobacco: Never Used   Substance and Sexual Activity   • Alcohol use: No   • Drug use: No   • Sexual activity: Yes     Partners: Male     Birth control/protection: Post-menopausal, Surgical         Current Outpatient Medications   Medication Sig Dispense Refill   • ALPRAZolam (XANAX) 0.5 MG tablet Take 0.5 mg by mouth. 2-3 times daily as needed for anxiety     • aspirin 81 MG tablet Take 81 mg by mouth every night.     • butalbital-aspirin-caffeine (FIORINAL) -40 MG capsule Take 1 capsule by mouth every 4 (four) hours as needed for headaches.     • clopidogrel (PLAVIX) 75 MG tablet Take 1 tablet by mouth Daily. 90 tablet 3   • cyanocobalamin 1000 MCG/ML injection ADMINISTER 1 ML UNDER THE SKIN EVERY 28 DAYS 1 mL 5   • escitalopram (LEXAPRO) 10 MG tablet Take 1 tablet by mouth Daily. 30 tablet 5   • ezetimibe-simvastatin (VYTORIN) 10-40 MG per tablet Take 1 tablet by mouth Every Night. 30 tablet 6   • fluticasone (FLONASE) 50 MCG/ACT nasal spray 2 sprays into the nostril(s) as directed by provider Daily. Administer 2 sprays in each nostril for each dose. 16 g 5   • meclizine (ANTIVERT) 25 MG tablet Take 1 tablet by mouth 3 (Three) Times a Day As Needed for dizziness. 30 tablet 0   • metoprolol succinate XL (TOPROL-XL) 50 MG 24 hr tablet Take 0.5 tablets by mouth Daily. 90 tablet 1   • omeprazole (priLOSEC) 20 MG capsule Take 1 capsule by mouth Daily. 30  "capsule 3   • vitamin D (ERGOCALCIFEROL) 66954 units capsule capsule TAKE ONE CAPSULE BY MOUTH  ON MONDAYS AND FRIDAYS 23 capsule 0     No current facility-administered medications for this visit.          OBJECTIVE    /78   Pulse 50   Ht 157.5 cm (62.01\")   Wt 80 kg (176 lb 6.4 oz)   LMP 04/11/1989 (Within Months)   SpO2 98%   BMI 32.26 kg/m²         Review of Systems     Constitutional:  Denies recent weight loss, weight gain, fever or chills, fatigue     HENT:  Denies any hearing loss, epistaxis, hoarseness, or difficulty speaking.     Eyes: Wears eyeglasses or contact lenses     Respiratory:  Denies dyspnea with exertion,no cough, wheezing, or hemoptysis.     Cardiovascular: Negative for palpations, chest pain, orthopnea, PND, peripheral edema, syncope, or claudication.     Gastrointestinal:  Denies change in bowel habits, dyspepsia, ulcer disease, hematochezia, or melena.     Endocrine: Negative for cold intolerance, heat intolerance, polydipsia, polyphagia and polyuria.     Genitourinary: Negative.      Musculoskeletal: Denies any history of arthritic symptoms or back problems     Skin:  Denies any change in hair or nails, rashes, or skin lesions.     Allergic/Immunologic: Negative.  Negative for environmental allergies, food allergies and immunocompromised state.     Neurological:  Denies any history of recurrent headaches, strokes, TIA, or seizure disorder.     Hematological: Denies any food allergies, seasonal allergies, bleeding disorders, or lymphadenopathy.     Psychiatric/Behavioral: history of depression, No substance abuse, or change in cognitive function.         Physical Exam     Constitutional: Cooperative, alert and oriented, in no acute distress.     HENT:   Head: Normocephalic, normal hair patterns, no masses or tenderness.  Ears, Nose, and Throat: No gross abnormalities. No pallor or cyanosis.    Eyes: EOMS intact, PERRL, conjunctivae and lids unremarkable. Fundoscopic exam and " visual fields not performed.   Neck: No palpable masses or adenopathy, no thyromegaly, no JVD, carotid pulses are full and equal bilaterally and without  Bruits.     Cardiovascular: Regular rhythm, S1 and S2 normal, no S3 or S4. No murmurs, gallops, or rubs detected.     Pulmonary/Chest: Chest: normal symmetry,  normal respiratory excursion, no intercostal retraction, no use of accessory muscles.            Pulmonary: Normal breath sounds. No rales or ronchi.    Abdominal: Abdomen soft, bowel sounds normoactive, no masses, no hepatosplenomegaly, non-tender, no bruits.     Musculoskeletal: No deformities, clubbing, cyanosis, erythema, or edema observed. There are no spinal abnormalities noted. Normal muscle strength and tone. Pulses full and equal in all extremities, no bruits auscultated.     Neurological: No gross motor or sensory deficits noted, affect appropriate, oriented to time, person, place.     Skin: Warm and dry to the touch, no apparent skin lesions or masses noted.     Psychiatric: She has a normal mood and affect. Her behavior is normal. Judgment and thought content normal.         Procedures      Lab Results   Component Value Date    WBC 6.17 10/12/2018    HGB 14.4 10/12/2018    HCT 43.6 10/12/2018    MCV 90.3 10/12/2018     10/12/2018     Lab Results   Component Value Date    GLUCOSE 86 10/12/2018    BUN 12 10/12/2018    CREATININE 0.72 10/12/2018    EGFRIFNONA 83 10/12/2018    BCR 16.7 10/12/2018    CO2 28.0 10/12/2018    CALCIUM 8.9 10/12/2018    ALBUMIN 3.70 10/12/2018    AST 52 (H) 10/12/2018    ALT 27 10/12/2018     Lab Results   Component Value Date    CHOL 218 (H) 10/12/2018    CHOL 191 06/15/2017     Lab Results   Component Value Date    TRIG 222 (H) 10/12/2018    TRIG 150 06/15/2017    TRIG 228 (H) 03/30/2016     Lab Results   Component Value Date    HDL 46 (L) 10/12/2018    HDL 44 (L) 06/15/2017    HDL 43 (L) 03/30/2016     No components found for: LDLCALC  Lab Results   Component  Value Date     10/12/2018     06/15/2017     (H) 03/30/2016     (H) 03/30/2016     No results found for: HDLLDLRATIO  No components found for: CHOLHDL  Lab Results   Component Value Date    HGBA1C 6.2 (H) 03/31/2016     Lab Results   Component Value Date    TSH 1.410 11/05/2018           ASSESSMENT AND PLAN  Mrs. Means stable at this time with no clinical evidence of progression of coronary artery disease.  Blood pressure is in the normal range.  I have continued antiplatelet therapy with aspirin and Plavix, loading therapy with Vytorin.  I have decreased the dose of metoprolol succinate to 25 mg daily with the hope that her heart rate increase along with improvement in fatigue.  I have advised her to get onto an exercise program.  Lab tests indicated below have been ordered.    Kimberly was seen today for fatigue.    Diagnoses and all orders for this visit:    Coronary arteriosclerosis  -     Lipid Panel; Future  -     CBC & Differential; Future  -     Comprehensive Metabolic Panel; Future  -     Hemoglobin A1c    Essential hypertension  -     Lipid Panel; Future  -     CBC & Differential; Future  -     Comprehensive Metabolic Panel; Future  -     Hemoglobin A1c    Mixed hyperlipidemia  -     Lipid Panel; Future  -     CBC & Differential; Future  -     Comprehensive Metabolic Panel; Future  -     Hemoglobin A1c    Other orders  -     metoprolol succinate XL (TOPROL-XL) 50 MG 24 hr tablet; Take 0.5 tablets by mouth Daily.        Patient's Body mass index is 32.26 kg/m². BMI is above normal parameters. Recommendations include: exercise counseling and nutrition counseling.        Nallely Foster MD  4/16/2019  3:40 PM

## 2019-04-18 ENCOUNTER — DOCUMENTATION (OUTPATIENT)
Dept: CARDIOLOGY | Facility: CLINIC | Age: 59
End: 2019-04-18

## 2019-04-26 ENCOUNTER — OFFICE VISIT (OUTPATIENT)
Dept: FAMILY MEDICINE CLINIC | Facility: CLINIC | Age: 59
End: 2019-04-26

## 2019-04-26 VITALS
WEIGHT: 173.8 LBS | DIASTOLIC BLOOD PRESSURE: 74 MMHG | HEIGHT: 62 IN | BODY MASS INDEX: 31.98 KG/M2 | SYSTOLIC BLOOD PRESSURE: 118 MMHG

## 2019-04-26 DIAGNOSIS — F41.1 GENERALIZED ANXIETY DISORDER: ICD-10-CM

## 2019-04-26 DIAGNOSIS — E66.09 CLASS 1 OBESITY DUE TO EXCESS CALORIES WITH SERIOUS COMORBIDITY AND BODY MASS INDEX (BMI) OF 31.0 TO 31.9 IN ADULT: Primary | ICD-10-CM

## 2019-04-26 DIAGNOSIS — F33.0 MILD EPISODE OF RECURRENT MAJOR DEPRESSIVE DISORDER (HCC): ICD-10-CM

## 2019-04-26 DIAGNOSIS — I25.10 CORONARY ARTERIOSCLEROSIS: ICD-10-CM

## 2019-04-26 DIAGNOSIS — I10 ESSENTIAL HYPERTENSION: ICD-10-CM

## 2019-04-26 DIAGNOSIS — E78.2 MIXED HYPERLIPIDEMIA: ICD-10-CM

## 2019-04-26 DIAGNOSIS — Z98.890 STATUS POST GASTRIC SURGERY: ICD-10-CM

## 2019-04-26 PROCEDURE — 99214 OFFICE O/P EST MOD 30 MIN: CPT | Performed by: NURSE PRACTITIONER

## 2019-04-26 RX ORDER — OMEPRAZOLE 20 MG/1
20 CAPSULE, DELAYED RELEASE ORAL DAILY
Qty: 90 CAPSULE | Refills: 2 | Status: SHIPPED | OUTPATIENT
Start: 2019-04-26 | End: 2020-01-02 | Stop reason: SDUPTHER

## 2019-04-26 RX ORDER — METOPROLOL SUCCINATE 50 MG/1
25 TABLET, EXTENDED RELEASE ORAL DAILY
Qty: 90 TABLET | Refills: 1 | Status: SHIPPED | OUTPATIENT
Start: 2019-04-26 | End: 2019-10-25

## 2019-04-26 RX ORDER — ESCITALOPRAM OXALATE 10 MG/1
10 TABLET ORAL DAILY
Qty: 90 TABLET | Refills: 2 | Status: SHIPPED | OUTPATIENT
Start: 2019-04-26 | End: 2020-01-02 | Stop reason: SDUPTHER

## 2019-04-26 RX ORDER — CLOPIDOGREL BISULFATE 75 MG/1
75 TABLET ORAL DAILY
Qty: 90 TABLET | Refills: 3 | Status: SHIPPED | OUTPATIENT
Start: 2019-04-26 | End: 2020-01-02 | Stop reason: SDUPTHER

## 2019-04-26 RX ORDER — CYANOCOBALAMIN 1000 UG/ML
1000 INJECTION, SOLUTION INTRAMUSCULAR; SUBCUTANEOUS
Qty: 1 ML | Refills: 5 | Status: SHIPPED | OUTPATIENT
Start: 2019-04-26 | End: 2020-01-02 | Stop reason: SDUPTHER

## 2019-04-26 RX ORDER — ERGOCALCIFEROL 1.25 MG/1
50000 CAPSULE ORAL 2 TIMES WEEKLY
Qty: 24 CAPSULE | Refills: 2 | Status: SHIPPED | OUTPATIENT
Start: 2019-04-29 | End: 2019-09-25 | Stop reason: SDUPTHER

## 2019-04-26 RX ORDER — ESCITALOPRAM OXALATE 10 MG/1
TABLET ORAL
Qty: 30 TABLET | Refills: 4 | Status: SHIPPED | OUTPATIENT
Start: 2019-04-26 | End: 2019-12-05

## 2019-04-26 NOTE — PROGRESS NOTES
"Subjective   Kimberly Means is a 58 y.o. female.  Six month follow-up.  For the past several months had been feeling fatigued and tired all of the time.  Was seen by her cardiologist and he reduced her metoprolol and \"I feel so much better.  It made all the difference in the world.\"  Had a gastric sleeve in the past and is concerned that her weight seems to be creeping back up.  Also having issues with rash to the skin folds from the excess loose skin.  Needs refills on her medications    Hypertension   This is a chronic problem. The current episode started more than 1 year ago. The problem is unchanged. The problem is controlled. There are no associated agents to hypertension. Risk factors for coronary artery disease include dyslipidemia, family history, post-menopausal state, stress and obesity. Past treatments include beta blockers. Current antihypertension treatment includes beta blockers. The current treatment provides significant improvement. There are no compliance problems.  Hypertensive end-organ damage includes CAD/MI.   Hyperlipidemia   This is a chronic problem. The current episode started more than 1 year ago. The problem is controlled. Recent lipid tests were reviewed and are high. Exacerbating diseases include obesity. Factors aggravating her hyperlipidemia include beta blockers and fatty foods. Current antihyperlipidemic treatment includes statins and ezetimibe. The current treatment provides moderate improvement of lipids. There are no compliance problems.  Risk factors for coronary artery disease include dyslipidemia, family history, hypertension, obesity and post-menopausal.   Depression   Visit Type: follow-up  Patient presents with the following symptoms: decreased concentration and depressed mood.  Patient is not experiencing: confusion, suicidal ideas and thoughts of death.  Frequency of symptoms: occasionally   Severity: mild   Sleep quality: good  Nighttime awakenings: " occasional  Compliance with medications:  %        Obesity   This is a chronic problem. The current episode started more than 1 year ago. The problem occurs constantly. The problem has been gradually worsening. Associated symptoms include a rash. The symptoms are aggravated by drinking and eating. Treatments tried: gastric sleeve and calorie counting  The treatment provided moderate relief.        The following portions of the patient's history were reviewed and updated as appropriate:     Current Outpatient Medications   Medication Sig Dispense Refill   • ALPRAZolam (XANAX) 0.5 MG tablet Take 0.5 mg by mouth. 2-3 times daily as needed for anxiety     • aspirin 81 MG tablet Take 81 mg by mouth every night.     • butalbital-aspirin-caffeine (FIORINAL) -40 MG capsule Take 1 capsule by mouth every 4 (four) hours as needed for headaches.     • clopidogrel (PLAVIX) 75 MG tablet Take 1 tablet by mouth Daily. 90 tablet 3   • cyanocobalamin 1000 MCG/ML injection Inject 1 mL into the appropriate muscle as directed by prescriber Every 28 (Twenty-Eight) Days. 1 mL 5   • escitalopram (LEXAPRO) 10 MG tablet Take 1 tablet by mouth Daily. 90 tablet 2   • ezetimibe-simvastatin (VYTORIN) 10-40 MG per tablet Take 1 tablet by mouth Every Night. 30 tablet 6   • fluticasone (FLONASE) 50 MCG/ACT nasal spray 2 sprays into the nostril(s) as directed by provider Daily. Administer 2 sprays in each nostril for each dose. 16 g 5   • meclizine (ANTIVERT) 25 MG tablet Take 1 tablet by mouth 3 (Three) Times a Day As Needed for dizziness. 30 tablet 0   • metoprolol succinate XL (TOPROL-XL) 50 MG 24 hr tablet Take 0.5 tablets by mouth Daily. 90 tablet 1   • omeprazole (priLOSEC) 20 MG capsule Take 1 capsule by mouth Daily. 90 capsule 2   • [START ON 4/29/2019] vitamin D (ERGOCALCIFEROL) 16267 units capsule capsule Take 1 capsule by mouth 2 (Two) Times a Week. 24 capsule 2   • Insulin Pen Needle (NOVOFINE PLUS) 32G X 4 MM misc 1 Device  Daily. 30 each 2   • Liraglutide -Weight Management 18 MG/3ML solution pen-injector Inject 3 mg under the skin into the appropriate area as directed Daily. 5 pen 3     No current facility-administered medications for this visit.      Current Outpatient Medications on File Prior to Visit   Medication Sig   • ALPRAZolam (XANAX) 0.5 MG tablet Take 0.5 mg by mouth. 2-3 times daily as needed for anxiety   • aspirin 81 MG tablet Take 81 mg by mouth every night.   • butalbital-aspirin-caffeine (FIORINAL) -40 MG capsule Take 1 capsule by mouth every 4 (four) hours as needed for headaches.   • ezetimibe-simvastatin (VYTORIN) 10-40 MG per tablet Take 1 tablet by mouth Every Night.   • fluticasone (FLONASE) 50 MCG/ACT nasal spray 2 sprays into the nostril(s) as directed by provider Daily. Administer 2 sprays in each nostril for each dose.   • meclizine (ANTIVERT) 25 MG tablet Take 1 tablet by mouth 3 (Three) Times a Day As Needed for dizziness.   • [DISCONTINUED] clopidogrel (PLAVIX) 75 MG tablet Take 1 tablet by mouth Daily.   • [DISCONTINUED] cyanocobalamin 1000 MCG/ML injection ADMINISTER 1 ML UNDER THE SKIN EVERY 28 DAYS   • [DISCONTINUED] escitalopram (LEXAPRO) 10 MG tablet Take 1 tablet by mouth Daily.   • [DISCONTINUED] metoprolol succinate XL (TOPROL-XL) 50 MG 24 hr tablet Take 0.5 tablets by mouth Daily.   • [DISCONTINUED] omeprazole (priLOSEC) 20 MG capsule Take 1 capsule by mouth Daily.   • [DISCONTINUED] vitamin D (ERGOCALCIFEROL) 95465 units capsule capsule TAKE ONE CAPSULE BY MOUTH  ON MONDAYS AND FRIDAYS     No current facility-administered medications on file prior to visit.      She is allergic to kiwi extract; lortab [hydrocodone-acetaminophen]; paxil [paroxetine]; dilaudid [hydromorphone]; latex; morphine and related; and sulfa antibiotics..    Review of Systems   Constitutional: Negative.    HENT: Negative.    Eyes: Negative.    Respiratory: Negative.    Cardiovascular: Negative.    Gastrointestinal:  "Negative.    Genitourinary: Negative.    Musculoskeletal: Negative.    Skin: Positive for rash.   Psychiatric/Behavioral: Positive for decreased concentration. Negative for confusion and suicidal ideas.       Objective    Visit Vitals  /74   Ht 157.5 cm (62\")   Wt 78.8 kg (173 lb 12.8 oz)   LMP 04/11/1989 (Within Months)   BMI 31.79 kg/m²       Physical Exam   Constitutional: She is oriented to person, place, and time. She appears well-developed and well-nourished.   HENT:   Head: Normocephalic.   Right Ear: External ear normal.   Left Ear: External ear normal.   Eyes: EOM are normal. Pupils are equal, round, and reactive to light.   Neck: Normal range of motion. Neck supple.   Cardiovascular: Normal rate, regular rhythm and normal heart sounds.   Pulmonary/Chest: Effort normal and breath sounds normal.   Abdominal: Soft. Bowel sounds are normal.   Musculoskeletal: Normal range of motion.   Neurological: She is alert and oriented to person, place, and time.   Skin: Skin is warm. Capillary refill takes less than 2 seconds. Rash noted.   Erythematous, convalescing patches and scales noted to abdominal skin folds and under bilateral breasts.  Excoriation present   Psychiatric: She has a normal mood and affect. Her behavior is normal.   Nursing note and vitals reviewed.    Results for orders placed or performed in visit on 04/16/19   Lipid Panel   Result Value Ref Range    Total Cholesterol 218 (H) 0 - 200 mg/dL    Triglycerides 195 (H) 0 - 150 mg/dL    HDL Cholesterol 59 40 - 60 mg/dL    LDL Cholesterol  120 (H) 0 - 100 mg/dL    VLDL Cholesterol 39 5 - 40 mg/dL    LDL/HDL Ratio 2.03    Comprehensive Metabolic Panel   Result Value Ref Range    Glucose 89 65 - 99 mg/dL    BUN 14 8 - 23 mg/dL    Creatinine 0.69 0.57 - 1.00 mg/dL    Sodium 144 136 - 145 mmol/L    Potassium 4.5 3.5 - 5.2 mmol/L    Chloride 104 98 - 107 mmol/L    CO2 28.3 22.0 - 29.0 mmol/L    Calcium 8.9 8.6 - 10.5 mg/dL    Total Protein 7.0 6.0 - 8.5 " g/dL    Albumin 4.10 3.50 - 5.20 g/dL    ALT (SGPT) 14 1 - 33 U/L    AST (SGOT) 14 1 - 32 U/L    Alkaline Phosphatase 64 39 - 117 U/L    Total Bilirubin 0.4 0.2 - 1.2 mg/dL    eGFR Non African Amer 87 >60 mL/min/1.73    Globulin 2.9 gm/dL    A/G Ratio 1.4 g/dL    BUN/Creatinine Ratio 20.3 7.0 - 25.0    Anion Gap 11.7 mmol/L   CBC Auto Differential   Result Value Ref Range    WBC 7.00 3.40 - 10.80 10*3/mm3    RBC 4.91 3.77 - 5.28 10*6/mm3    Hemoglobin 14.1 12.0 - 15.9 g/dL    Hematocrit 43.9 34.0 - 46.6 %    MCV 89.4 79.0 - 97.0 fL    MCH 28.7 26.6 - 33.0 pg    MCHC 32.1 31.5 - 35.7 g/dL    RDW 13.2 12.3 - 15.4 %    RDW-SD 42.8 37.0 - 54.0 fl    MPV 10.6 6.0 - 12.0 fL    Platelets 241 140 - 450 10*3/mm3    Neutrophil % 49.3 42.7 - 76.0 %    Lymphocyte % 41.3 19.6 - 45.3 %    Monocyte % 6.3 5.0 - 12.0 %    Eosinophil % 2.4 0.3 - 6.2 %    Basophil % 0.6 0.0 - 1.5 %    Immature Grans % 0.1 0.0 - 0.5 %    Neutrophils, Absolute 3.45 1.40 - 7.00 10*3/mm3    Lymphocytes, Absolute 2.89 0.70 - 3.10 10*3/mm3    Monocytes, Absolute 0.44 0.10 - 0.90 10*3/mm3    Eosinophils, Absolute 0.17 0.00 - 0.40 10*3/mm3    Basophils, Absolute 0.04 0.00 - 0.20 10*3/mm3    Immature Grans, Absolute 0.01 0.00 - 0.05 10*3/mm3    nRBC 0.0 0.0 - 0.0 /100 WBC         Assessment/Plan   Problems Addressed this Visit        Cardiovascular and Mediastinum    Hyperlipidemia    Essential hypertension    Relevant Medications    metoprolol succinate XL (TOPROL-XL) 50 MG 24 hr tablet    Coronary arteriosclerosis    Relevant Medications    clopidogrel (PLAVIX) 75 MG tablet    metoprolol succinate XL (TOPROL-XL) 50 MG 24 hr tablet       Other    Generalized anxiety disorder    Relevant Medications    escitalopram (LEXAPRO) 10 MG tablet    Liraglutide -Weight Management 18 MG/3ML solution pen-injector      Other Visit Diagnoses     Class 1 obesity due to excess calories with serious comorbidity and body mass index (BMI) of 31.0 to 31.9 in adult    -  Primary     Relevant Medications    Liraglutide -Weight Management 18 MG/3ML solution pen-injector    Insulin Pen Needle (NOVOFINE PLUS) 32G X 4 MM misc    Mild episode of recurrent major depressive disorder (CMS/HCC)        Relevant Medications    escitalopram (LEXAPRO) 10 MG tablet    Liraglutide -Weight Management 18 MG/3ML solution pen-injector    Status post gastric surgery        Relevant Medications    cyanocobalamin 1000 MCG/ML injection        1.  Essential hypertension:  Continue on Toprol-XL as previously prescribed  Encouraged to reduce sodium intake to no more than 2000 mg per day     2.  Hyperlipidemia:  Continue on Vytorin as previously prescribed  Complete fasting lipid panel as ordered and will notify of results when available  Encouraged low-fat diet     3.  Depressive disorder:  Continue on Lexapro as previously prescribed  Seek emergency medical treatment for any new or worsening suicidal/homicidal ideations    4.  Class I obesity due to excess calories with serious comorbidity and a body mass index of 31.0-31.9 in adult:  Begin Saxenda as prescribed  Educated on possible side effects of this medication including but not limited to increased risk for pancreatitis, medullary thyroid cancer and increase gastrointestinal discomfort  Encouraged to reduce caloric intake to no more than 2000 liliam per day  Encouraged to increase physical activity regimen to a minimum of 150 minutes per week    Continue on current medications as previously prescribed   Return in about 3 months (around 7/26/2019) for Recheck.        This document has been electronically signed by NICOLLE Gomez on April 26, 2019 8:38 AM

## 2019-05-03 ENCOUNTER — TELEPHONE (OUTPATIENT)
Dept: FAMILY MEDICINE CLINIC | Facility: CLINIC | Age: 59
End: 2019-05-03

## 2019-05-03 NOTE — TELEPHONE ENCOUNTER
Kimberly called needing a refill for Omeprazole to [facility] in [place].  She forgot to tell Naomi when she was here.

## 2019-05-03 NOTE — TELEPHONE ENCOUNTER
Refills sent 04/26/19 90 day supply with 2 refills  No Refills needed today, [facility] received the refill

## 2019-06-25 ENCOUNTER — TELEPHONE (OUTPATIENT)
Dept: FAMILY MEDICINE CLINIC | Facility: CLINIC | Age: 59
End: 2019-06-25

## 2019-06-25 NOTE — TELEPHONE ENCOUNTER
Kimberly has found a plastic surgeon in [place] she is wanting a referral to for skin removal from gastric sleeve surg.  Her name is Tia Burton and ph # [phone]

## 2019-06-26 DIAGNOSIS — L98.7 EXCESS SKIN: Primary | ICD-10-CM

## 2019-08-27 DIAGNOSIS — E66.09 CLASS 1 OBESITY DUE TO EXCESS CALORIES WITH SERIOUS COMORBIDITY AND BODY MASS INDEX (BMI) OF 31.0 TO 31.9 IN ADULT: ICD-10-CM

## 2019-08-27 RX ORDER — LIRAGLUTIDE 6 MG/ML
INJECTION, SOLUTION SUBCUTANEOUS
Qty: 5 PEN | Refills: 2 | Status: SHIPPED | OUTPATIENT
Start: 2019-08-27 | End: 2019-08-29 | Stop reason: SDUPTHER

## 2019-08-29 ENCOUNTER — OFFICE VISIT (OUTPATIENT)
Dept: FAMILY MEDICINE CLINIC | Facility: CLINIC | Age: 59
End: 2019-08-29

## 2019-08-29 VITALS
WEIGHT: 167.9 LBS | HEIGHT: 62 IN | BODY MASS INDEX: 30.9 KG/M2 | SYSTOLIC BLOOD PRESSURE: 112 MMHG | DIASTOLIC BLOOD PRESSURE: 68 MMHG

## 2019-08-29 DIAGNOSIS — Z13.29 SCREENING FOR HYPOTHYROIDISM: ICD-10-CM

## 2019-08-29 DIAGNOSIS — F32.A DEPRESSIVE DISORDER: ICD-10-CM

## 2019-08-29 DIAGNOSIS — I10 ESSENTIAL HYPERTENSION: Primary | ICD-10-CM

## 2019-08-29 DIAGNOSIS — E66.09 CLASS 1 OBESITY DUE TO EXCESS CALORIES WITH SERIOUS COMORBIDITY AND BODY MASS INDEX (BMI) OF 30.0 TO 30.9 IN ADULT: ICD-10-CM

## 2019-08-29 DIAGNOSIS — E78.2 MIXED HYPERLIPIDEMIA: ICD-10-CM

## 2019-08-29 PROCEDURE — 99214 OFFICE O/P EST MOD 30 MIN: CPT | Performed by: NURSE PRACTITIONER

## 2019-09-23 ENCOUNTER — OFFICE VISIT (OUTPATIENT)
Dept: FAMILY MEDICINE CLINIC | Facility: CLINIC | Age: 59
End: 2019-09-23

## 2019-09-23 ENCOUNTER — LAB (OUTPATIENT)
Dept: LAB | Facility: HOSPITAL | Age: 59
End: 2019-09-23

## 2019-09-23 VITALS
HEIGHT: 62 IN | DIASTOLIC BLOOD PRESSURE: 82 MMHG | WEIGHT: 171.4 LBS | OXYGEN SATURATION: 98 % | BODY MASS INDEX: 31.54 KG/M2 | SYSTOLIC BLOOD PRESSURE: 120 MMHG | HEART RATE: 72 BPM | RESPIRATION RATE: 18 BRPM

## 2019-09-23 DIAGNOSIS — R53.83 OTHER FATIGUE: ICD-10-CM

## 2019-09-23 DIAGNOSIS — R11.0 NAUSEA: ICD-10-CM

## 2019-09-23 DIAGNOSIS — R04.0 BLEEDING FROM THE NOSE: ICD-10-CM

## 2019-09-23 DIAGNOSIS — M25.50 ARTHRALGIA, UNSPECIFIED JOINT: ICD-10-CM

## 2019-09-23 DIAGNOSIS — R53.83 OTHER FATIGUE: Primary | ICD-10-CM

## 2019-09-23 LAB
ALBUMIN SERPL-MCNC: 3.7 G/DL (ref 3.5–5.2)
ALBUMIN/GLOB SERPL: 1.2 G/DL
ALP SERPL-CCNC: 62 U/L (ref 39–117)
ALT SERPL W P-5'-P-CCNC: 19 U/L (ref 1–33)
ANION GAP SERPL CALCULATED.3IONS-SCNC: 7 MMOL/L (ref 5–15)
AST SERPL-CCNC: 21 U/L (ref 1–32)
BASOPHILS # BLD AUTO: 0.05 10*3/MM3 (ref 0–0.2)
BASOPHILS NFR BLD AUTO: 0.6 % (ref 0–1.5)
BILIRUB SERPL-MCNC: 0.3 MG/DL (ref 0.2–1.2)
BUN BLD-MCNC: 12 MG/DL (ref 8–23)
BUN/CREAT SERPL: 16.2 (ref 7–25)
CALCIUM SPEC-SCNC: 8.8 MG/DL (ref 8.6–10.5)
CHLORIDE SERPL-SCNC: 106 MMOL/L (ref 98–107)
CO2 SERPL-SCNC: 29 MMOL/L (ref 22–29)
CREAT BLD-MCNC: 0.74 MG/DL (ref 0.57–1)
DEPRECATED RDW RBC AUTO: 42 FL (ref 37–54)
EOSINOPHIL # BLD AUTO: 0.14 10*3/MM3 (ref 0–0.4)
EOSINOPHIL NFR BLD AUTO: 1.7 % (ref 0.3–6.2)
ERYTHROCYTE [DISTWIDTH] IN BLOOD BY AUTOMATED COUNT: 12.9 % (ref 12.3–15.4)
FERRITIN SERPL-MCNC: 146 NG/ML (ref 13–150)
GFR SERPL CREATININE-BSD FRML MDRD: 80 ML/MIN/1.73
GLOBULIN UR ELPH-MCNC: 3.1 GM/DL
GLUCOSE BLD-MCNC: 89 MG/DL (ref 65–99)
HBA1C MFR BLD: 5.4 % (ref 4.8–5.6)
HCT VFR BLD AUTO: 40.5 % (ref 34–46.6)
HGB BLD-MCNC: 13.2 G/DL (ref 12–15.9)
IMM GRANULOCYTES # BLD AUTO: 0.01 10*3/MM3 (ref 0–0.05)
IMM GRANULOCYTES NFR BLD AUTO: 0.1 % (ref 0–0.5)
IRON 24H UR-MRATE: 71 MCG/DL (ref 37–145)
IRON SATN MFR SERPL: 21 % (ref 20–50)
LYMPHOCYTES # BLD AUTO: 2.86 10*3/MM3 (ref 0.7–3.1)
LYMPHOCYTES NFR BLD AUTO: 35.5 % (ref 19.6–45.3)
MCH RBC QN AUTO: 29.5 PG (ref 26.6–33)
MCHC RBC AUTO-ENTMCNC: 32.6 G/DL (ref 31.5–35.7)
MCV RBC AUTO: 90.6 FL (ref 79–97)
MONOCYTES # BLD AUTO: 0.55 10*3/MM3 (ref 0.1–0.9)
MONOCYTES NFR BLD AUTO: 6.8 % (ref 5–12)
NEUTROPHILS # BLD AUTO: 4.45 10*3/MM3 (ref 1.7–7)
NEUTROPHILS NFR BLD AUTO: 55.3 % (ref 42.7–76)
NRBC BLD AUTO-RTO: 0 /100 WBC (ref 0–0.2)
NT-PROBNP SERPL-MCNC: 118.3 PG/ML (ref 5–900)
PLATELET # BLD AUTO: 242 10*3/MM3 (ref 140–450)
PMV BLD AUTO: 9.7 FL (ref 6–12)
POTASSIUM BLD-SCNC: 4 MMOL/L (ref 3.5–5.2)
PROT SERPL-MCNC: 6.8 G/DL (ref 6–8.5)
RBC # BLD AUTO: 4.47 10*6/MM3 (ref 3.77–5.28)
SODIUM BLD-SCNC: 142 MMOL/L (ref 136–145)
T4 FREE SERPL-MCNC: 1.02 NG/DL (ref 0.93–1.7)
TIBC SERPL-MCNC: 340 MCG/DL (ref 298–536)
TRANSFERRIN SERPL-MCNC: 228 MG/DL (ref 200–360)
TSH SERPL DL<=0.05 MIU/L-ACNC: 1.33 UIU/ML (ref 0.27–4.2)
VIT B12 BLD-MCNC: 486 PG/ML (ref 211–946)
WBC NRBC COR # BLD: 8.06 10*3/MM3 (ref 3.4–10.8)

## 2019-09-23 PROCEDURE — 82728 ASSAY OF FERRITIN: CPT

## 2019-09-23 PROCEDURE — 93010 ELECTROCARDIOGRAM REPORT: CPT | Performed by: INTERNAL MEDICINE

## 2019-09-23 PROCEDURE — 83880 ASSAY OF NATRIURETIC PEPTIDE: CPT

## 2019-09-23 PROCEDURE — 86666 EHRLICHIA ANTIBODY: CPT

## 2019-09-23 PROCEDURE — 99214 OFFICE O/P EST MOD 30 MIN: CPT | Performed by: NURSE PRACTITIONER

## 2019-09-23 PROCEDURE — 84443 ASSAY THYROID STIM HORMONE: CPT

## 2019-09-23 PROCEDURE — 84439 ASSAY OF FREE THYROXINE: CPT

## 2019-09-23 PROCEDURE — 84466 ASSAY OF TRANSFERRIN: CPT

## 2019-09-23 PROCEDURE — 80053 COMPREHEN METABOLIC PANEL: CPT

## 2019-09-23 PROCEDURE — 86757 RICKETTSIA ANTIBODY: CPT

## 2019-09-23 PROCEDURE — 83036 HEMOGLOBIN GLYCOSYLATED A1C: CPT

## 2019-09-23 PROCEDURE — 85025 COMPLETE CBC W/AUTO DIFF WBC: CPT

## 2019-09-23 PROCEDURE — 82607 VITAMIN B-12: CPT

## 2019-09-23 PROCEDURE — 86618 LYME DISEASE ANTIBODY: CPT

## 2019-09-23 PROCEDURE — 93005 ELECTROCARDIOGRAM TRACING: CPT | Performed by: NURSE PRACTITIONER

## 2019-09-23 PROCEDURE — 83540 ASSAY OF IRON: CPT

## 2019-09-23 PROCEDURE — 36415 COLL VENOUS BLD VENIPUNCTURE: CPT

## 2019-09-23 NOTE — PROGRESS NOTES
Subjective   Kimberly Means is a 59 y.o. female.     Ms. Means is a 59-year-old female who presents today with multiple symptoms including fatigue, generalized body aches nausea x2 to 3 months.  She reports her symptoms are intermittent in nature but appear to be getting worse with time.  She does report having increased generalized aches over the last week and had one episode of nosebleed over the weekend.  She does use a steroid-based nasal spray daily and takes a blood thinner.  She has a history of gastric bypass as well.  She denies any known tick bites but does state she is outdoors a lot and wooded areas from time to time.  She denies any personal or family history of autoimmune disease.  She denies any chest pain or shortness of breath but does have a cardiac history including cardiac stent.  She denies any changes in her bowel or urinary habits.         The following portions of the patient's history were reviewed and updated as appropriate: allergies, current medications, past family history, past medical history, past social history, past surgical history and problem list.    Review of Systems   Constitutional: Positive for fatigue (x2-3 months, intermittent). Negative for activity change, appetite change, unexpected weight gain and unexpected weight loss.   HENT: Negative for congestion, sore throat, trouble swallowing and voice change.    Eyes: Negative.    Respiratory: Negative for cough, chest tightness, shortness of breath and wheezing.    Cardiovascular: Negative for chest pain, palpitations and leg swelling.   Gastrointestinal: Positive for nausea. Negative for abdominal pain, constipation, diarrhea and vomiting.   Endocrine: Negative.    Genitourinary: Negative for dysuria.   Musculoskeletal: Positive for arthralgias and myalgias.        Generalized intermittent aches and pains over 2 to 3 months.   Skin: Negative for rash.   Allergic/Immunologic: Negative.    Neurological: Positive for weakness  (x 2-3 months, intermittent). Negative for dizziness.   Hematological: Negative for adenopathy. Bruises/bleeds easily (nose bleed x2 over the weekend, takes blood thinners and flonase daily).   Psychiatric/Behavioral: Negative.        Objective   Physical Exam   Constitutional: She is oriented to person, place, and time. She appears well-developed and well-nourished. No distress.   HENT:   Head: Normocephalic and atraumatic.   Right Ear: External ear normal.   Left Ear: External ear normal.   Nose: Nose normal.   Mouth/Throat: Oropharynx is clear and moist.   Eyes: Conjunctivae and EOM are normal. Pupils are equal, round, and reactive to light.   Neck: Normal range of motion. Neck supple.   Cardiovascular: Normal rate, regular rhythm, normal heart sounds and intact distal pulses. Exam reveals no gallop and no friction rub.   No murmur heard.  Pulmonary/Chest: Effort normal and breath sounds normal. No stridor. No respiratory distress. She has no wheezes. She has no rales.   Abdominal: Soft. Bowel sounds are normal. She exhibits no distension and no mass. There is no tenderness. There is no rebound and no guarding. No hernia.   Musculoskeletal: Normal range of motion. She exhibits no edema or tenderness.   Lymphadenopathy:     She has no cervical adenopathy.   Neurological: She is alert and oriented to person, place, and time. She has normal strength. She is not disoriented. No cranial nerve deficit or sensory deficit. Coordination and gait normal.   Skin: Skin is warm and dry. No rash noted. She is not diaphoretic. No erythema. No pallor.   Psychiatric: She has a normal mood and affect. Her behavior is normal. Judgment and thought content normal.   Nursing note and vitals reviewed.        Assessment/Plan   Kimberly was seen today for fatigue, generalized body aches, nose bleed and nausea.    Diagnoses and all orders for this visit:    Other fatigue  -     CBC & Differential; Future  -     Comprehensive Metabolic  Panel; Future  -     Hemoglobin A1c; Future  -     Vitamin B12; Future  -     Iron Profile; Future  -     Ferritin; Future  -     TSH; Future  -     T4, Free; Future  -     Lyme, Total Antibody Test / Reflex; Future  -     [place] SF (IgG / M); Future  -     Ehrlichia Antibody Panel; Future  -     BNP; Future  -     ECG 12 Lead    Bleeding from the nose    Arthralgia, unspecified joint  -     Lyme, Total Antibody Test / Reflex; Future  -     [place] SF (IgG / M); Future  -     Ehrlichia Antibody Panel; Future    Nausea    1.  Fatigue- CBC, CMP, hemoglobin A1c, vitamin B12, iron profile, ferritin, TSH, T4, Lyme disease, [place] spotted fever, ehrlichiosis, BNP.  Will call with results.  EKG shows sinus bradycardia with heart rate of 57.  Patient's average baseline is low 60s per chart.    2.  Arthralgias- Lyme disease, [place] spotted fever, ehrlichiosis.  Will call with results.    3.  Bleeding from nose- instructed patient to temporarily stop nasal spray.  Nasal exam unremarkable.  Patient instructed to seek follow-up care if symptoms worsen.  Patient instructed go to the ER for profuse and/or long episodes of nasal bleeding.  Patient verbalized understanding of instruction.    4.  Nausea- patient instructed to increase water intake and follow a bland diet avoiding greasy and spicy foods.  We will continue to monitor.    5.  Further plan of care and follow-up pending lab results.            This document has been electronically signed by NICOLLE Paris on September 23, 2019 12:38 PM

## 2019-09-24 ENCOUNTER — TELEPHONE (OUTPATIENT)
Dept: FAMILY MEDICINE CLINIC | Facility: CLINIC | Age: 59
End: 2019-09-24

## 2019-09-24 LAB — B BURGDOR IGG+IGM SER-ACNC: <0.91 ISR (ref 0–0.9)

## 2019-09-25 LAB
A PHAGOCYTOPH IGM TITR SER IF: NEGATIVE {TITER}
CONV HGE IGG TITER: NEGATIVE
E CHAFFEENSIS IGG TITR SER IF: NEGATIVE {TITER}
E. CHAFFEENSIS (HME) IGM TITER: NEGATIVE

## 2019-09-25 RX ORDER — ERGOCALCIFEROL 1.25 MG/1
CAPSULE ORAL
Qty: 24 CAPSULE | Refills: 1 | Status: SHIPPED | OUTPATIENT
Start: 2019-09-25 | End: 2020-01-02 | Stop reason: SDUPTHER

## 2019-09-26 LAB
R RICKETTSI IGG SER QL IA: NEGATIVE
R RICKETTSI IGM TITR SER: 0.41 INDEX (ref 0–0.89)

## 2019-10-25 ENCOUNTER — LAB (OUTPATIENT)
Dept: LAB | Facility: HOSPITAL | Age: 59
End: 2019-10-25

## 2019-10-25 ENCOUNTER — OFFICE VISIT (OUTPATIENT)
Dept: CARDIOLOGY | Facility: CLINIC | Age: 59
End: 2019-10-25

## 2019-10-25 VITALS
HEIGHT: 62 IN | DIASTOLIC BLOOD PRESSURE: 68 MMHG | HEART RATE: 76 BPM | WEIGHT: 167.7 LBS | BODY MASS INDEX: 30.86 KG/M2 | SYSTOLIC BLOOD PRESSURE: 118 MMHG | OXYGEN SATURATION: 98 %

## 2019-10-25 DIAGNOSIS — I25.10 CORONARY ARTERIOSCLEROSIS: ICD-10-CM

## 2019-10-25 DIAGNOSIS — I10 ESSENTIAL HYPERTENSION: Primary | ICD-10-CM

## 2019-10-25 DIAGNOSIS — E78.2 MIXED HYPERLIPIDEMIA: ICD-10-CM

## 2019-10-25 LAB
CHOLEST SERPL-MCNC: 153 MG/DL (ref 0–200)
HDLC SERPL-MCNC: 58 MG/DL (ref 40–60)
LDLC SERPL CALC-MCNC: 76 MG/DL (ref 0–100)
LDLC/HDLC SERPL: 1.3 {RATIO}
TRIGL SERPL-MCNC: 97 MG/DL (ref 0–150)
VLDLC SERPL-MCNC: 19.4 MG/DL (ref 5–40)

## 2019-10-25 PROCEDURE — 80061 LIPID PANEL: CPT

## 2019-10-25 PROCEDURE — 36415 COLL VENOUS BLD VENIPUNCTURE: CPT

## 2019-10-25 PROCEDURE — 99214 OFFICE O/P EST MOD 30 MIN: CPT | Performed by: INTERNAL MEDICINE

## 2019-10-25 RX ORDER — EZETIMIBE AND SIMVASTATIN 10; 40 MG/1; MG/1
TABLET ORAL
Qty: 30 TABLET | Refills: 5 | Status: SHIPPED | OUTPATIENT
Start: 2019-10-25 | End: 2020-04-08

## 2019-10-25 RX ORDER — METOPROLOL SUCCINATE 25 MG/1
25 TABLET, EXTENDED RELEASE ORAL DAILY
Qty: 90 TABLET | Refills: 3 | Status: SHIPPED | OUTPATIENT
Start: 2019-10-25 | End: 2020-06-26

## 2019-10-25 NOTE — PROGRESS NOTES
"Kimberly Means  59 y.o. female    10/25/2019  1. Essential hypertension    2. Mixed hyperlipidemia    3. Coronary arteriosclerosis        History of Present Illness  Kimberly Means is here for follow-up of her above-stated problems.  She is done well from a cardiac standpoint with no chest pain, shortness of breath, palpitation, dizziness or syncope.  She has been compliant with the medication.  Blood pressure was in the normal range.  Her PCI to left anterior descending coronary artery was back in 2007.  Her LDL continues to remain elevated in spite of taking lipid-lowering therapy on a regular basis.  She is on Vytorin.  She has been watching her diet closely and has lost weight.  Her last LDL was 120.    SUBJECTIVE    Allergies   Allergen Reactions   • Kiwi Extract Anaphylaxis   • Lortab [Hydrocodone-Acetaminophen] Confusion and Hallucinations   • Paxil [Paroxetine] Other (See Comments)     \"MESSES WITH MY MIND\"   • Dilaudid [Hydromorphone] Other (See Comments)     Rapid heart rate  Rapid heart rate   • Latex Rash   • Morphine And Related Itching   • Sulfa Antibiotics Hives and Rash         Past Medical History:   Diagnosis Date   • Abdominal pain    • Acute bronchitis    • Acute left otitis media    • Acute otitis externa    • Allergic rhinitis     Due to pollen   • Ankle pain    • Asthma    • Astigmatism    • Bacterial pneumonia 12/02/2015   • Contact dermatitis    • Coronary arteriosclerosis     stent to LAD 2006      • Cough    • Depressive disorder     pt states she has not suffered from depression, takes lexapro for anxiety   • Diarrhea    • Dysuria    • Encounter for Papanicolaou smear of cervix 06/01/2009   • Epigastric pain    • Epistaxis    • Essential hypertension    • Fatigue    • Fever    • Functional diarrhea    • Gastroenteritis    • Generalized anxiety disorder 01/2015    given samples of lexapro.   • Heel pain    • Hematochezia    • Hernia of abdominal wall 05/13/2016    incarcerated- primarily " repaired. Now recurrent      • History of bone density study 05/27/2014   • History of colonoscopy     diagnostic (screening) 94057- Normal colonoscopy.;  Last Performed: 12/10/2015   • History of esophagogastroduodenoscopy     epigastric pain, gastritis without bleeding,gastroesophageal reflux without esophagitis;  Last Performed: 01/06/1999   • History of mammogram 06/29/2016   • History of screening mammography    • Hyperglycemia    • Hyperlipidemia    • Impaired glucose tolerance    • Irritable bowel syndrome    • Measles    • Mechanical complication of internal orthopedic device (CMS/[facility])    • Migraine    • Morbid obesity due to excess calories (CMS/[facility])    • Mumps    • Myopia    • Nausea    • Need for DTaP vaccine 01/17/2015   • Retinal tear    • Spasm of back muscles    • Sprain of ankle    • Umbilical hernia    • Upper respiratory infection    • Urinary tract infectious disease    • Ventral hernia     Open ventral hernioplasty. Incarcerated ventral hernia;  Last Performed: 07/17/2012   • Vitamin D deficiency    • Vitreous detachment    • Vitreous opacities          Past Surgical History:   Procedure Laterality Date   • CERVICAL CONIZATION      stress urinary incontinence,endocervical dysplasia;  Last Performed: 09/05/1989   • CHOLECYSTECTOMY  08/26/1993    Laparoscopic   • COLONOSCOPY N/A 2/17/2017    Procedure: COLONOSCOPY;  Surgeon: Young Villegas MD;  Location: [facility] ENDOSCOPY;  Service:    • CORONARY ANGIOPLASTY      stent to LAD;  Last Performed: 2006   • CORONARY ANGIOPLASTY WITH STENT PLACEMENT     • EPIGASTRIC HERNIA REPAIR  02/02/1973   • GASTRIC SLEEVE LAPAROSCOPIC  11/08/2016   • HYSTERECTOMY  09/05/1989    mmk urethropexy   • TONSILLECTOMY      postoperative tonsillectomy,transfixed bleeding vessel, right tonsillar fossa;  Last Performed: 01/05/1974   • VAGINAL DELIVERY      x 2         Family History   Problem Relation Age of Onset   • Thyroid disease Mother    • Diabetes type II Mother    •  Long QT syndrome Mother    • Hypothyroidism Mother    • Breast cancer Mother    • Thyroid disease Father    • Coronary artery disease Father    • Tremor Father    • Diabetes Father    • COPD Father    • Coronary artery disease Other    • Diabetes Other    • Hypertension Other          Social History     Socioeconomic History   • Marital status:      Spouse name: Milla   • Number of children: 2   • Years of education: Not on file   • Highest education level: Not on file   Occupational History   • Occupation:      Employer: [employer]   Tobacco Use   • Smoking status: Never Smoker   • Smokeless tobacco: Never Used   Substance and Sexual Activity   • Alcohol use: No   • Drug use: No   • Sexual activity: Yes     Partners: Male     Birth control/protection: Post-menopausal, Surgical         Current Outpatient Medications   Medication Sig Dispense Refill   • ALPRAZolam (XANAX) 0.5 MG tablet Take 0.5 mg by mouth. 2-3 times daily as needed for anxiety     • aspirin 81 MG tablet Take 81 mg by mouth every night.     • butalbital-aspirin-caffeine (FIORINAL) -40 MG capsule Take 1 capsule by mouth every 4 (four) hours as needed for headaches.     • clopidogrel (PLAVIX) 75 MG tablet Take 1 tablet by mouth Daily. 90 tablet 3   • cyanocobalamin 1000 MCG/ML injection Inject 1 mL into the appropriate muscle as directed by prescriber Every 28 (Twenty-Eight) Days. 1 mL 5   • escitalopram (LEXAPRO) 10 MG tablet Take 1 tablet by mouth Daily. 90 tablet 2   • escitalopram (LEXAPRO) 10 MG tablet TAKE ONE TABLET BY MOUTH DAILY 30 tablet 4   • ezetimibe-simvastatin (VYTORIN) 10-40 MG per tablet Take 1 tablet by mouth Every Night. 30 tablet 6   • fluticasone (FLONASE) 50 MCG/ACT nasal spray 2 sprays into the nostril(s) as directed by provider Daily. Administer 2 sprays in each nostril for each dose. 16 g 5   • Insulin Pen Needle (NOVOFINE PLUS) 32G X 4 MM misc 1 Device Daily. 30 each 2   • Liraglutide  "-Weight Management (SAXENDA) 18 MG/3ML solution pen-injector Inject 2.4 mg under the skin into the appropriate area as directed Daily. 5 pen 2   • metoprolol succinate XL (TOPROL-XL) 50 MG 24 hr tablet Take 0.5 tablets by mouth Daily. 90 tablet 1   • omeprazole (priLOSEC) 20 MG capsule Take 1 capsule by mouth Daily. 90 capsule 2   • vitamin D (ERGOCALCIFEROL) 60351 units capsule capsule TAKE ONE CAPSULE BY MOUTH TWO TIMES A WEEK 24 capsule 1     No current facility-administered medications for this visit.          OBJECTIVE    /68   Pulse 76   Ht 157.5 cm (62.01\")   Wt 76.1 kg (167 lb 11.2 oz)   LMP 04/11/1989 (Within Months)   SpO2 98%   BMI 30.66 kg/m²         Review of Systems     Constitutional:  Denies recent weight loss, weight gain, fever or chills, no change in exercise tolerance     HENT:  Denies any hearing loss, epistaxis, hoarseness, or difficulty speaking.     Eyes: Wears eyeglasses or contact lenses     Respiratory:  Denies dyspnea with exertion,no cough, wheezing, or hemoptysis.     Cardiovascular: Negative for palpations, chest pain, orthopnea, PND, peripheral edema, syncope, or claudication.     Gastrointestinal:  Denies change in bowel habits, dyspepsia, ulcer disease, hematochezia, or melena.     Endocrine: Negative for cold intolerance, heat intolerance, polydipsia, polyphagia and polyuria. Denies any history of weight change, heat/cold intolerance, polydipsia, polyuria     Genitourinary: Negative.      Musculoskeletal: Denies any history of arthritic symptoms or back problems     Skin:  Denies any change in hair or nails, rashes, or skin lesions.     Allergic/Immunologic: Negative.  Negative for environmental allergies, food allergies and immunocompromised state.     Neurological:  Denies any history of recurrent headaches, strokes, TIA, or seizure disorder.     Hematological: Denies any food allergies, seasonal allergies, bleeding disorders, or lymphadenopathy. "     Psychiatric/Behavioral: history of depression, no substance abuse, or change in cognitive function.         Physical Exam     Constitutional: Cooperative, alert and oriented,  in no acute distress.     HENT:   Head: Normocephalic, normal hair patterns, no masses or tenderness.  Ears, Nose, and Throat: No gross abnormalities. No pallor or cyanosis.   Eyes: EOMS intact, PERRL, conjunctivae and lids unremarkable. Fundoscopic exam and visual fields not performed.   Neck: No palpable masses or adenopathy, no thyromegaly, no JVD, carotid pulses are full and equal bilaterally and without  Bruits.     Cardiovascular: Regular rhythm, S1 and S2 normal, no S3 or S4.  No murmurs, gallops, or rubs detected.     Pulmonary/Chest: Chest: normal symmetry, no tenderness to palpation, normal respiratory excursion, no intercostal retraction, no use of accessory muscles.            Pulmonary: Normal breath sounds. No rales or ronchi.    Abdominal: Abdomen soft, bowel sounds normoactive, no masses, no hepatosplenomegaly, non-tender, no bruits.     Musculoskeletal: No deformities, clubbing, cyanosis, erythema, or edema observed. There are no spinal abnormalities noted. Normal muscle strength and tone. Pulses full and equal in all extremities, no bruits auscultated.     Neurological: No gross motor or sensory deficits noted, affect appropriate, oriented to time, person, place.     Skin: Warm and dry to the touch, no apparent skin lesions or masses noted.     Psychiatric: She has a normal mood and affect. Her behavior is normal. Judgment and thought content normal.         Procedures      Lab Results   Component Value Date    WBC 8.06 09/23/2019    HGB 13.2 09/23/2019    HCT 40.5 09/23/2019    MCV 90.6 09/23/2019     09/23/2019     Lab Results   Component Value Date    GLUCOSE 89 09/23/2019    BUN 12 09/23/2019    CREATININE 0.74 09/23/2019    EGFRIFNONA 80 09/23/2019    BCR 16.2 09/23/2019    CO2 29.0 09/23/2019    CALCIUM 8.8  09/23/2019    ALBUMIN 3.70 09/23/2019    AST 21 09/23/2019    ALT 19 09/23/2019     Lab Results   Component Value Date    CHOL 218 (H) 04/16/2019    CHOL 218 (H) 10/12/2018    CHOL 191 06/15/2017     Lab Results   Component Value Date    TRIG 195 (H) 04/16/2019    TRIG 222 (H) 10/12/2018    TRIG 150 06/15/2017     Lab Results   Component Value Date    HDL 59 04/16/2019    HDL 46 (L) 10/12/2018    HDL 44 (L) 06/15/2017     No components found for: LDLCALC  Lab Results   Component Value Date     (H) 04/16/2019     10/12/2018     06/15/2017     No results found for: HDLLDLRATIO  No components found for: CHOLHDL  Lab Results   Component Value Date    HGBA1C 5.40 09/23/2019     Lab Results   Component Value Date    TSH 1.330 09/23/2019           ASSESSMENT AND PLAN  Kimberly Means is stable with regard to her heart with no clinical evidence of progression of coronary artery disease.  I have continued antiplatelet therapy with aspirin and Plavix, antihypertensive therapy with metoprolol succinate and lipid-lowering therapy with Vytorin has been continued.  The plan would be to recheck her LDL and if it is greater than 100 I will consider starting her on PCSK9 inhibitors, to decrease her cardiovascular risk.    Kimberly was seen today for follow-up.    Diagnoses and all orders for this visit:    Essential hypertension    Mixed hyperlipidemia    Coronary arteriosclerosis        Patient's Body mass index is 30.66 kg/m². BMI is above normal parameters. Recommendations include: exercise counseling and nutrition counseling.  Patient is a non-smoker    Nallely Foster MD  10/25/2019  8:16 AM

## 2019-10-29 ENCOUNTER — DOCUMENTATION (OUTPATIENT)
Dept: CARDIOLOGY | Facility: CLINIC | Age: 59
End: 2019-10-29

## 2019-12-05 ENCOUNTER — OFFICE VISIT (OUTPATIENT)
Dept: FAMILY MEDICINE CLINIC | Facility: CLINIC | Age: 59
End: 2019-12-05

## 2019-12-05 VITALS
OXYGEN SATURATION: 98 % | WEIGHT: 164.2 LBS | SYSTOLIC BLOOD PRESSURE: 118 MMHG | DIASTOLIC BLOOD PRESSURE: 70 MMHG | BODY MASS INDEX: 31 KG/M2 | HEART RATE: 81 BPM | HEIGHT: 61 IN

## 2019-12-05 DIAGNOSIS — Z00.00 ANNUAL PHYSICAL EXAM: Primary | ICD-10-CM

## 2019-12-05 DIAGNOSIS — Z12.39 SCREENING FOR BREAST CANCER: ICD-10-CM

## 2019-12-05 DIAGNOSIS — Z23 NEED FOR SHINGLES VACCINE: ICD-10-CM

## 2019-12-05 DIAGNOSIS — E66.09 CLASS 1 OBESITY DUE TO EXCESS CALORIES WITH SERIOUS COMORBIDITY AND BODY MASS INDEX (BMI) OF 31.0 TO 31.9 IN ADULT: ICD-10-CM

## 2019-12-05 PROCEDURE — 90471 IMMUNIZATION ADMIN: CPT | Performed by: NURSE PRACTITIONER

## 2019-12-05 PROCEDURE — 99396 PREV VISIT EST AGE 40-64: CPT | Performed by: NURSE PRACTITIONER

## 2019-12-05 PROCEDURE — 90750 HZV VACC RECOMBINANT IM: CPT | Performed by: NURSE PRACTITIONER

## 2019-12-05 NOTE — PROGRESS NOTES
Subjective   Kimberly Means is a 59 y.o. female.  Annual physical exam     HPI:  Annual physical exam          The following portions of the patient's history were reviewed and updated as appropriate:     She  has a past medical history of Abdominal pain, Acute bronchitis, Acute left otitis media, Acute otitis externa, Allergic rhinitis, Ankle pain, Asthma, Astigmatism, Bacterial pneumonia (12/02/2015), Contact dermatitis, Coronary arteriosclerosis, Cough, Depressive disorder, Diarrhea, Dysuria, Encounter for Papanicolaou smear of cervix (06/01/2009), Epigastric pain, Epistaxis, Essential hypertension, Fatigue, Fever, Functional diarrhea, Gastroenteritis, Generalized anxiety disorder (01/2015), Heel pain, Hematochezia, Hernia of abdominal wall (05/13/2016), History of bone density study (05/27/2014), History of colonoscopy, History of esophagogastroduodenoscopy, History of mammogram (06/29/2016), History of screening mammography, Hyperglycemia, Hyperlipidemia, Impaired glucose tolerance, Irritable bowel syndrome, Measles, Mechanical complication of internal orthopedic device (CMS/[facility]), Migraine, Morbid obesity due to excess calories (CMS/[facility]), Mumps, Myopia, Nausea, Need for DTaP vaccine (01/17/2015), Retinal tear, Spasm of back muscles, Sprain of ankle, Umbilical hernia, Upper respiratory infection, Urinary tract infectious disease, Ventral hernia, Vitamin D deficiency, Vitreous detachment, and Vitreous opacities.  She does not have any pertinent problems on file.  She  has a past surgical history that includes Coronary angioplasty; Cholecystectomy (08/26/1993); Cervical Corpectomy; Epigastric Hernia Repair (02/02/1973); Tonsillectomy; Hysterectomy (09/05/1989); Vaginal delivery; Gastric Sleeve (11/08/2016); Colonoscopy (N/A, 2/17/2017); and Coronary angioplasty with stent.  Her family history includes Breast cancer in her mother; COPD in her father; Coronary artery disease in her father and another family  member; Diabetes in her father and another family member; Diabetes type II in her mother; Hypertension in an other family member; Hypothyroidism in her mother; Long QT syndrome in her mother; Thyroid disease in her father and mother; Tremor in her father.  She  reports that she has never smoked. She has never used smokeless tobacco. She reports that she does not drink alcohol or use drugs.  Current Outpatient Medications   Medication Sig Dispense Refill   • ALPRAZolam (XANAX) 0.5 MG tablet Take 0.5 mg by mouth. 2-3 times daily as needed for anxiety     • aspirin 81 MG tablet Take 81 mg by mouth every night.     • butalbital-aspirin-caffeine (FIORINAL) -40 MG capsule Take 1 capsule by mouth every 4 (four) hours as needed for headaches.     • ciprofloxacin (CIPRO) 250 MG tablet Take 1 tablet by mouth 2 (Two) Times a Day for 7 days. 14 tablet 0   • clopidogrel (PLAVIX) 75 MG tablet Take 1 tablet by mouth Daily. 90 tablet 3   • cyanocobalamin 1000 MCG/ML injection Inject 1 mL into the appropriate muscle as directed by prescriber Every 28 (Twenty-Eight) Days. 1 mL 5   • escitalopram (LEXAPRO) 10 MG tablet Take 1 tablet by mouth Daily. 90 tablet 2   • ezetimibe-simvastatin (VYTORIN) 10-40 MG per tablet TAKE ONE TABLET BY MOUTH ONCE NIGHTLY 30 tablet 5   • fluconazole (DIFLUCAN) 150 MG tablet Take 1 tablet by mouth Daily. 4 tablet 0   • fluticasone (FLONASE) 50 MCG/ACT nasal spray 2 sprays into the nostril(s) as directed by provider Daily. Administer 2 sprays in each nostril for each dose. 16 g 5   • Insulin Pen Needle (NOVOFINE PLUS) 32G X 4 MM misc 1 Device Daily. 30 each 5   • Liraglutide -Weight Management (SAXENDA) 18 MG/3ML solution pen-injector Inject 2.4 mg under the skin into the appropriate area as directed Daily. 5 pen 2   • metoprolol succinate XL (TOPROL-XL) 25 MG 24 hr tablet Take 1 tablet by mouth Daily. 90 tablet 3   • omeprazole (priLOSEC) 20 MG capsule Take 1 capsule by mouth Daily. 90 capsule 2   •  phenazopyridine (PYRIDIUM) 200 MG tablet Take 1 tablet by mouth 3 (Three) Times a Day As Needed for Bladder Spasms. 30 tablet 0   • vitamin D (ERGOCALCIFEROL) 18221 units capsule capsule TAKE ONE CAPSULE BY MOUTH TWO TIMES A WEEK 24 capsule 1     No current facility-administered medications for this visit.      Current Outpatient Medications on File Prior to Visit   Medication Sig   • ALPRAZolam (XANAX) 0.5 MG tablet Take 0.5 mg by mouth. 2-3 times daily as needed for anxiety   • aspirin 81 MG tablet Take 81 mg by mouth every night.   • butalbital-aspirin-caffeine (FIORINAL) -40 MG capsule Take 1 capsule by mouth every 4 (four) hours as needed for headaches.   • ciprofloxacin (CIPRO) 250 MG tablet Take 1 tablet by mouth 2 (Two) Times a Day for 7 days.   • clopidogrel (PLAVIX) 75 MG tablet Take 1 tablet by mouth Daily.   • cyanocobalamin 1000 MCG/ML injection Inject 1 mL into the appropriate muscle as directed by prescriber Every 28 (Twenty-Eight) Days.   • escitalopram (LEXAPRO) 10 MG tablet Take 1 tablet by mouth Daily.   • ezetimibe-simvastatin (VYTORIN) 10-40 MG per tablet TAKE ONE TABLET BY MOUTH ONCE NIGHTLY   • fluconazole (DIFLUCAN) 150 MG tablet Take 1 tablet by mouth Daily.   • fluticasone (FLONASE) 50 MCG/ACT nasal spray 2 sprays into the nostril(s) as directed by provider Daily. Administer 2 sprays in each nostril for each dose.   • Liraglutide -Weight Management (SAXENDA) 18 MG/3ML solution pen-injector Inject 2.4 mg under the skin into the appropriate area as directed Daily.   • metoprolol succinate XL (TOPROL-XL) 25 MG 24 hr tablet Take 1 tablet by mouth Daily.   • omeprazole (priLOSEC) 20 MG capsule Take 1 capsule by mouth Daily.   • phenazopyridine (PYRIDIUM) 200 MG tablet Take 1 tablet by mouth 3 (Three) Times a Day As Needed for Bladder Spasms.   • vitamin D (ERGOCALCIFEROL) 94905 units capsule capsule TAKE ONE CAPSULE BY MOUTH TWO TIMES A WEEK     No current facility-administered medications  "on file prior to visit.      She is allergic to kiwi extract; lortab [hydrocodone-acetaminophen]; paxil [paroxetine]; dilaudid [hydromorphone]; latex; morphine and related; and sulfa antibiotics..    Review of Systems   Constitutional: Negative.  Negative for chills, diaphoresis, fatigue and fever.   HENT: Negative.    Eyes: Negative.    Respiratory: Negative.    Cardiovascular: Negative.    Gastrointestinal: Negative.    Genitourinary: Negative.    Musculoskeletal: Negative.    Skin: Negative.    Neurological: Negative.    Psychiatric/Behavioral: Negative.  Negative for confusion.       Objective    Visit Vitals  /70   Pulse 81   Ht 154.9 cm (61\")   Wt 74.5 kg (164 lb 3.2 oz)   LMP 04/11/1989 (Within Months)   SpO2 98%   BMI 31.03 kg/m²       Physical Exam   Constitutional: She is oriented to person, place, and time. She appears well-developed and well-nourished.   HENT:   Head: Normocephalic.   Right Ear: External ear normal.   Left Ear: External ear normal.   Eyes: EOM are normal. Pupils are equal, round, and reactive to light.   Neck: Normal range of motion. Neck supple.   Cardiovascular: Normal rate, regular rhythm and normal heart sounds.   Pulmonary/Chest: Effort normal and breath sounds normal.   Abdominal: Soft. Bowel sounds are normal.   Musculoskeletal: Normal range of motion.   Neurological: She is alert and oriented to person, place, and time.   Skin: Skin is warm. Capillary refill takes less than 2 seconds.   Psychiatric: She has a normal mood and affect. Her behavior is normal.   Nursing note and vitals reviewed.      Assessment/Plan   Problems Addressed this Visit     None      Visit Diagnoses     Annual physical exam    -  Primary    Class 1 obesity due to excess calories with serious comorbidity and body mass index (BMI) of 31.0 to 31.9 in adult        Relevant Medications    Insulin Pen Needle (NOVOFINE PLUS) 32G X 4 MM misc    Screening for breast cancer        Relevant Orders    Mammo " Screening Digital Tomosynthesis Bilateral With CAD    Need for shingles vaccine        Relevant Orders    Shingrix Vaccine (Completed)        New Medications Ordered This Visit   Medications   • Insulin Pen Needle (NOVOFINE PLUS) 32G X 4 MM misc     Si Device Daily.     Dispense:  30 each     Refill:  5       1.  Annual physical exam:  Continue on current medications as previously prescribed   I spent 31 minutes in direct face to face contact with patient.  Greater than 50% of this time was spent counseling patient and discussing plan of care.  Return in about 6 months (around 2020) for Recheck.    2.  Class I obesity due to excess calories with serious comorbidity and a BMI of 31.0-31.9 in adult:  Continue on Saxenda as previously prescribed  Refill prescription provided for Novofine plus needles  Encouraged to continue on reduce caloric intake of no more than 1600 liliam/day  Encouraged to continue with exercise routine for minimum of 150 minutes/week    3.  Screening for breast cancer:  Radiology will call to schedule bilateral mammogram  Encouraged monthly self breast exams at home    4.  Need for shingles vaccine:  Shingrix vaccine given IM in office  Educated on possible side effects of this medication including but not limited to increased risk for pain, swelling and redness of injection site        This document has been electronically signed by NICOLLE Gomez on 2019 12:46 PM

## 2019-12-19 ENCOUNTER — APPOINTMENT (OUTPATIENT)
Dept: CT IMAGING | Facility: HOSPITAL | Age: 59
End: 2019-12-19

## 2019-12-19 ENCOUNTER — HOSPITAL ENCOUNTER (EMERGENCY)
Facility: HOSPITAL | Age: 59
Discharge: HOME | End: 2019-12-19
Attending: EMERGENCY MEDICINE | Admitting: FAMILY MEDICINE

## 2019-12-19 VITALS
WEIGHT: 169.2 LBS | HEART RATE: 80 BPM | OXYGEN SATURATION: 97 % | DIASTOLIC BLOOD PRESSURE: 71 MMHG | SYSTOLIC BLOOD PRESSURE: 116 MMHG | BODY MASS INDEX: 31.95 KG/M2 | TEMPERATURE: 98.1 F | HEIGHT: 61 IN | RESPIRATION RATE: 18 BRPM

## 2019-12-19 DIAGNOSIS — K43.9 HERNIA OF ABDOMINAL WALL: ICD-10-CM

## 2019-12-19 DIAGNOSIS — R10.9 ABDOMINAL PAIN, UNSPECIFIED ABDOMINAL LOCATION: Primary | ICD-10-CM

## 2019-12-19 LAB
ALBUMIN SERPL-MCNC: 4 G/DL (ref 3.5–5.2)
ALBUMIN/GLOB SERPL: 1.4 G/DL
ALP SERPL-CCNC: 60 U/L (ref 39–117)
ALT SERPL W P-5'-P-CCNC: 17 U/L (ref 1–33)
ANION GAP SERPL CALCULATED.3IONS-SCNC: 10 MMOL/L (ref 5–15)
AST SERPL-CCNC: 16 U/L (ref 1–32)
BACTERIA UR QL AUTO: ABNORMAL /HPF
BASOPHILS # BLD AUTO: 0.03 10*3/MM3 (ref 0–0.2)
BASOPHILS NFR BLD AUTO: 0.4 % (ref 0–1.5)
BILIRUB SERPL-MCNC: 0.4 MG/DL (ref 0.2–1.2)
BILIRUB UR QL STRIP: NEGATIVE
BUN BLD-MCNC: 13 MG/DL (ref 8–23)
BUN/CREAT SERPL: 15.1 (ref 7–25)
CALCIUM SPEC-SCNC: 9.1 MG/DL (ref 8.6–10.5)
CHLORIDE SERPL-SCNC: 103 MMOL/L (ref 98–107)
CLARITY UR: ABNORMAL
CO2 SERPL-SCNC: 28 MMOL/L (ref 22–29)
COLOR UR: YELLOW
CREAT BLD-MCNC: 0.86 MG/DL (ref 0.57–1)
D-LACTATE SERPL-SCNC: 0.5 MMOL/L (ref 0.5–2)
DEPRECATED RDW RBC AUTO: 41.1 FL (ref 37–54)
EOSINOPHIL # BLD AUTO: 0.08 10*3/MM3 (ref 0–0.4)
EOSINOPHIL NFR BLD AUTO: 1.1 % (ref 0.3–6.2)
ERYTHROCYTE [DISTWIDTH] IN BLOOD BY AUTOMATED COUNT: 12.6 % (ref 12.3–15.4)
GFR SERPL CREATININE-BSD FRML MDRD: 67 ML/MIN/1.73
GLOBULIN UR ELPH-MCNC: 2.8 GM/DL
GLUCOSE BLD-MCNC: 86 MG/DL (ref 65–99)
GLUCOSE UR STRIP-MCNC: NEGATIVE MG/DL
HCT VFR BLD AUTO: 39.7 % (ref 34–46.6)
HGB BLD-MCNC: 12.9 G/DL (ref 12–15.9)
HGB UR QL STRIP.AUTO: NEGATIVE
HOLD SPECIMEN: NORMAL
HOLD SPECIMEN: NORMAL
HYALINE CASTS UR QL AUTO: ABNORMAL /LPF
IMM GRANULOCYTES # BLD AUTO: 0.01 10*3/MM3 (ref 0–0.05)
IMM GRANULOCYTES NFR BLD AUTO: 0.1 % (ref 0–0.5)
KETONES UR QL STRIP: NEGATIVE
LEUKOCYTE ESTERASE UR QL STRIP.AUTO: ABNORMAL
LIPASE SERPL-CCNC: 44 U/L (ref 13–60)
LYMPHOCYTES # BLD AUTO: 2.1 10*3/MM3 (ref 0.7–3.1)
LYMPHOCYTES NFR BLD AUTO: 28.3 % (ref 19.6–45.3)
MCH RBC QN AUTO: 28.9 PG (ref 26.6–33)
MCHC RBC AUTO-ENTMCNC: 32.5 G/DL (ref 31.5–35.7)
MCV RBC AUTO: 89 FL (ref 79–97)
MONOCYTES # BLD AUTO: 0.5 10*3/MM3 (ref 0.1–0.9)
MONOCYTES NFR BLD AUTO: 6.7 % (ref 5–12)
NEUTROPHILS # BLD AUTO: 4.71 10*3/MM3 (ref 1.7–7)
NEUTROPHILS NFR BLD AUTO: 63.4 % (ref 42.7–76)
NITRITE UR QL STRIP: NEGATIVE
NRBC BLD AUTO-RTO: 0 /100 WBC (ref 0–0.2)
PH UR STRIP.AUTO: 6.5 [PH] (ref 5–9)
PLATELET # BLD AUTO: 210 10*3/MM3 (ref 140–450)
PMV BLD AUTO: 9.1 FL (ref 6–12)
POTASSIUM BLD-SCNC: 4.2 MMOL/L (ref 3.5–5.2)
PROT SERPL-MCNC: 6.8 G/DL (ref 6–8.5)
PROT UR QL STRIP: NEGATIVE
RBC # BLD AUTO: 4.46 10*6/MM3 (ref 3.77–5.28)
RBC # UR: ABNORMAL /HPF
REF LAB TEST METHOD: ABNORMAL
SODIUM BLD-SCNC: 141 MMOL/L (ref 136–145)
SP GR UR STRIP: 1.01 (ref 1–1.03)
SQUAMOUS #/AREA URNS HPF: ABNORMAL /HPF
UROBILINOGEN UR QL STRIP: ABNORMAL
WBC NRBC COR # BLD: 7.43 10*3/MM3 (ref 3.4–10.8)
WBC UR QL AUTO: ABNORMAL /HPF
WHOLE BLOOD HOLD SPECIMEN: NORMAL
WHOLE BLOOD HOLD SPECIMEN: NORMAL

## 2019-12-19 PROCEDURE — 99284 EMERGENCY DEPT VISIT MOD MDM: CPT

## 2019-12-19 PROCEDURE — 74177 CT ABD & PELVIS W/CONTRAST: CPT

## 2019-12-19 PROCEDURE — 80053 COMPREHEN METABOLIC PANEL: CPT | Performed by: EMERGENCY MEDICINE

## 2019-12-19 PROCEDURE — 96374 THER/PROPH/DIAG INJ IV PUSH: CPT

## 2019-12-19 PROCEDURE — 25010000002 IOPAMIDOL 61 % SOLUTION: Performed by: EMERGENCY MEDICINE

## 2019-12-19 PROCEDURE — 85025 COMPLETE CBC W/AUTO DIFF WBC: CPT | Performed by: EMERGENCY MEDICINE

## 2019-12-19 PROCEDURE — 83605 ASSAY OF LACTIC ACID: CPT | Performed by: EMERGENCY MEDICINE

## 2019-12-19 PROCEDURE — 81001 URINALYSIS AUTO W/SCOPE: CPT | Performed by: EMERGENCY MEDICINE

## 2019-12-19 PROCEDURE — 96361 HYDRATE IV INFUSION ADD-ON: CPT

## 2019-12-19 PROCEDURE — 25010000002 ONDANSETRON PER 1 MG: Performed by: EMERGENCY MEDICINE

## 2019-12-19 PROCEDURE — 83690 ASSAY OF LIPASE: CPT | Performed by: EMERGENCY MEDICINE

## 2019-12-19 RX ORDER — ONDANSETRON 2 MG/ML
4 INJECTION INTRAMUSCULAR; INTRAVENOUS ONCE
Status: COMPLETED | OUTPATIENT
Start: 2019-12-19 | End: 2019-12-19

## 2019-12-19 RX ORDER — SODIUM CHLORIDE 0.9 % (FLUSH) 0.9 %
10 SYRINGE (ML) INJECTION AS NEEDED
Status: DISCONTINUED | OUTPATIENT
Start: 2019-12-19 | End: 2019-12-19 | Stop reason: HOSPADM

## 2019-12-19 RX ORDER — SODIUM CHLORIDE 9 MG/ML
125 INJECTION, SOLUTION INTRAVENOUS CONTINUOUS
Status: DISCONTINUED | OUTPATIENT
Start: 2019-12-19 | End: 2019-12-19 | Stop reason: HOSPADM

## 2019-12-19 RX ADMIN — ONDANSETRON 4 MG: 2 INJECTION INTRAMUSCULAR; INTRAVENOUS at 16:49

## 2019-12-19 RX ADMIN — SODIUM CHLORIDE 1000 ML: 9 INJECTION, SOLUTION INTRAVENOUS at 16:49

## 2019-12-19 RX ADMIN — IOPAMIDOL 90 ML: 612 INJECTION, SOLUTION INTRAVENOUS at 18:50

## 2019-12-19 NOTE — ED NOTES
Patient asked to give urine sample when able.  Urine cup provided.     Ethel Hensley, RN  12/19/19 [id number]

## 2019-12-20 NOTE — ED PROVIDER NOTES
Subjective   Patient presents emergency department with 1 day history of abdominal pain.  Patient notes this in the left upper quadrant and left side of the abdomen.  Patient states this feels very similar to a obstructive pathology she has had in the past with an internal hernia.  Patient was actually losing weight over the last year to have a surgery to repair this hernia.  Patient has gastric sleeve surgery in the past.  Patient without vomiting, though states that she does not know if she can actually vomit.  Patient has had nausea over the past 12 hours.  Patient has had the abdominal pain as well.  There is been no fevers or chills.  Patient states she has not moved her bowels but this is not abnormal as she only moves her bowels about twice a week because she is does not eat a lot.          Review of Systems   Constitutional: Positive for appetite change and fatigue. Negative for chills and fever.   HENT: Negative.  Negative for congestion.    Eyes: Negative.  Negative for photophobia and visual disturbance.   Respiratory: Negative.  Negative for cough, chest tightness and shortness of breath.    Cardiovascular: Negative.  Negative for chest pain and palpitations.   Gastrointestinal: Positive for abdominal distention and abdominal pain. Negative for constipation, diarrhea, nausea and vomiting.   Endocrine: Negative.    Genitourinary: Negative.  Negative for decreased urine volume, dysuria, flank pain and hematuria.   Musculoskeletal: Negative.  Negative for arthralgias, back pain, myalgias, neck pain and neck stiffness.   Skin: Negative.  Negative for pallor.   Neurological: Negative.  Negative for dizziness, syncope, weakness, light-headedness, numbness and headaches.   Psychiatric/Behavioral: Negative.  Negative for confusion and suicidal ideas. The patient is not nervous/anxious.    All other systems reviewed and are negative.      Past Medical History:   Diagnosis Date   • Abdominal pain    • Acute  bronchitis    • Acute left otitis media    • Acute otitis externa    • Allergic rhinitis     Due to pollen   • Ankle pain    • Asthma    • Astigmatism    • Bacterial pneumonia 12/02/2015   • Contact dermatitis    • Coronary arteriosclerosis     stent to LAD 2006      • Cough    • Depressive disorder     pt states she has not suffered from depression, takes lexapro for anxiety   • Diarrhea    • Dysuria    • Encounter for Papanicolaou smear of cervix 06/01/2009   • Epigastric pain    • Epistaxis    • Essential hypertension    • Fatigue    • Fever    • Functional diarrhea    • Gastroenteritis    • Generalized anxiety disorder 01/2015    given samples of lexapro.   • Heel pain    • Hematochezia    • Hernia of abdominal wall 05/13/2016    incarcerated- primarily repaired. Now recurrent      • History of bone density study 05/27/2014   • History of colonoscopy     diagnostic (screening) 15848- Normal colonoscopy.;  Last Performed: 12/10/2015   • History of esophagogastroduodenoscopy     epigastric pain, gastritis without bleeding,gastroesophageal reflux without esophagitis;  Last Performed: 01/06/1999   • History of mammogram 06/29/2016   • History of screening mammography    • Hyperglycemia    • Hyperlipidemia    • Impaired glucose tolerance    • Irritable bowel syndrome    • Measles    • Mechanical complication of internal orthopedic device (CMS/HCC)    • Migraine    • Morbid obesity due to excess calories (CMS/HCC)    • Mumps    • Myopia    • Nausea    • Need for DTaP vaccine 01/17/2015   • Retinal tear    • Spasm of back muscles    • Sprain of ankle    • Umbilical hernia    • Upper respiratory infection    • Urinary tract infectious disease    • Ventral hernia     Open ventral hernioplasty. Incarcerated ventral hernia;  Last Performed: 07/17/2012   • Vitamin D deficiency    • Vitreous detachment    • Vitreous opacities        Allergies   Allergen Reactions   • Kiwi Extract Anaphylaxis   • Lortab  "[Hydrocodone-Acetaminophen] Confusion and Hallucinations   • Paxil [Paroxetine] Other (See Comments)     \"MESSES WITH MY MIND\"   • Dilaudid [Hydromorphone] Other (See Comments)     Rapid heart rate  Rapid heart rate   • Latex Rash   • Morphine And Related Itching   • Sulfa Antibiotics Hives and Rash       Past Surgical History:   Procedure Laterality Date   • CERVICAL CONIZATION      stress urinary incontinence,endocervical dysplasia;  Last Performed: 09/05/1989   • CHOLECYSTECTOMY  08/26/1993    Laparoscopic   • COLONOSCOPY N/A 2/17/2017    Procedure: COLONOSCOPY;  Surgeon: Young Villegas MD;  Location: [facility] ENDOSCOPY;  Service:    • CORONARY ANGIOPLASTY      stent to LAD;  Last Performed: 2006   • CORONARY ANGIOPLASTY WITH STENT PLACEMENT     • EPIGASTRIC HERNIA REPAIR  02/02/1973   • GASTRIC SLEEVE LAPAROSCOPIC  11/08/2016   • HYSTERECTOMY  09/05/1989    mmk urethropexy   • TONSILLECTOMY      postoperative tonsillectomy,transfixed bleeding vessel, right tonsillar fossa;  Last Performed: 01/05/1974   • VAGINAL DELIVERY      x 2       Family History   Problem Relation Age of Onset   • Thyroid disease Mother    • Diabetes type II Mother    • Long QT syndrome Mother    • Hypothyroidism Mother    • Breast cancer Mother    • Thyroid disease Father    • Coronary artery disease Father    • Tremor Father    • Diabetes Father    • COPD Father    • Coronary artery disease Other    • Diabetes Other    • Hypertension Other        Social History     Socioeconomic History   • Marital status:      Spouse name: Milla   • Number of children: 2   • Years of education: Not on file   • Highest education level: Not on file   Occupational History   • Occupation:      Employer: [employer]   Tobacco Use   • Smoking status: Never Smoker   • Smokeless tobacco: Never Used   Substance and Sexual Activity   • Alcohol use: No   • Drug use: No   • Sexual activity: Yes     Partners: Male     Birth " control/protection: Post-menopausal, Surgical           Objective   Physical Exam   Constitutional: She is oriented to person, place, and time. She appears well-developed and well-nourished. She does not appear ill. She appears distressed.   HENT:   Head: Normocephalic and atraumatic.   Nose: Nose normal.   Mouth/Throat: Oropharynx is clear and moist.   Eyes: Conjunctivae and EOM are normal. No scleral icterus.   Neck: Normal range of motion. Neck supple. No JVD present.   Cardiovascular: Normal rate, regular rhythm, normal heart sounds and intact distal pulses. Exam reveals no gallop and no friction rub.   No murmur heard.  Pulmonary/Chest: Effort normal. No respiratory distress. She has no wheezes. She has no rales. She exhibits no tenderness.   Abdominal: Soft. She exhibits no distension and no mass. There is tenderness in the periumbilical area, left upper quadrant and left lower quadrant. There is no rigidity, no rebound and no guarding.   Musculoskeletal: Normal range of motion. She exhibits no edema, tenderness or deformity.   Lymphadenopathy:     She has no cervical adenopathy.   Neurological: She is alert and oriented to person, place, and time. No cranial nerve deficit. She exhibits normal muscle tone.   Skin: Skin is warm and dry. Capillary refill takes less than 2 seconds. No rash noted. She is not diaphoretic. No erythema. No pallor.   Psychiatric: She has a normal mood and affect. Her behavior is normal. Judgment and thought content normal.   Nursing note and vitals reviewed.      Procedures           ED Course                      No data recorded                Labs Reviewed   URINALYSIS W/ MICROSCOPIC IF INDICATED (NO CULTURE) - Abnormal; Notable for the following components:       Result Value    Appearance, UA Cloudy (*)     Leuk Esterase, UA Moderate (2+) (*)     All other components within normal limits   URINALYSIS, MICROSCOPIC ONLY - Abnormal; Notable for the following components:    RBC, UA  0-2 (*)     WBC, UA 6-12 (*)     All other components within normal limits   LIPASE - Normal   LACTIC ACID, PLASMA - Normal   CBC WITH AUTO DIFFERENTIAL - Normal   RAINBOW DRAW    Narrative:     The following orders were created for panel order [facility] Draw.  Procedure                               Abnormality         Status                     ---------                               -----------         ------                     Light Blue Top[[id number]]                                   Final result               Green Top (Gel)[[id number]]                                  Final result               Lavender Top[[id number]]                                     Final result               Gold Top - SST[[id number]]                                   Final result                 Please view results for these tests on the individual orders.   COMPREHENSIVE METABOLIC PANEL    Narrative:     GFR Normal >60  Chronic Kidney Disease <60  Kidney Failure <15     CBC AND DIFFERENTIAL    Narrative:     The following orders were created for panel order CBC & Differential.  Procedure                               Abnormality         Status                     ---------                               -----------         ------                     CBC Auto Differential[[id number]]        Normal              Final result                 Please view results for these tests on the individual orders.   LIGHT BLUE TOP   GREEN TOP   LAVENDER TOP   GOLD TOP - SST       CT Abdomen Pelvis With Contrast   Final Result   1. No acute abnormality identified. Specifically, no evidence of   bowel obstruction   2. Anterior abdominal wall hernias, containing only fat, and   similar to the previous study   3. Status post gastric sleeve procedure, hysterectomy, and   cholecystectomy   4. Please see findings section above for further details      Electronically signed by:  Cari Sanderson MD  12/19/2019 8:16 PM   [email or web address] Workstation: [id number]                 MDM    Final diagnoses:   Abdominal pain, unspecified abdominal location   Hernia of abdominal wall              Lloyd Weiss MD  12/19/19 2051

## 2019-12-27 DIAGNOSIS — E66.09 CLASS 1 OBESITY DUE TO EXCESS CALORIES WITH SERIOUS COMORBIDITY AND BODY MASS INDEX (BMI) OF 30.0 TO 30.9 IN ADULT: ICD-10-CM

## 2019-12-27 RX ORDER — LIRAGLUTIDE 6 MG/ML
INJECTION, SOLUTION SUBCUTANEOUS
Qty: 15 ML | Refills: 1 | Status: SHIPPED | OUTPATIENT
Start: 2019-12-27 | End: 2019-12-30 | Stop reason: SDUPTHER

## 2019-12-30 ENCOUNTER — TELEPHONE (OUTPATIENT)
Dept: FAMILY MEDICINE CLINIC | Facility: CLINIC | Age: 59
End: 2019-12-30

## 2019-12-30 DIAGNOSIS — E66.09 CLASS 1 OBESITY DUE TO EXCESS CALORIES WITH SERIOUS COMORBIDITY AND BODY MASS INDEX (BMI) OF 30.0 TO 30.9 IN ADULT: ICD-10-CM

## 2019-12-30 NOTE — TELEPHONE ENCOUNTER
Needs her zenda sent to [facility].  Said, when she saw Naomi the last time, she asked her if she needed any of her meds called in and she thought she was ok, but needs this.

## 2020-01-02 ENCOUNTER — TELEPHONE (OUTPATIENT)
Dept: FAMILY MEDICINE CLINIC | Facility: CLINIC | Age: 60
End: 2020-01-02

## 2020-01-02 DIAGNOSIS — F41.1 GENERALIZED ANXIETY DISORDER: ICD-10-CM

## 2020-01-02 DIAGNOSIS — Z98.890 STATUS POST GASTRIC SURGERY: ICD-10-CM

## 2020-01-02 DIAGNOSIS — I25.10 CORONARY ARTERIOSCLEROSIS: ICD-10-CM

## 2020-01-02 RX ORDER — CYANOCOBALAMIN 1000 UG/ML
1000 INJECTION, SOLUTION INTRAMUSCULAR; SUBCUTANEOUS
Qty: 1 ML | Refills: 5 | Status: SHIPPED | OUTPATIENT
Start: 2020-01-02 | End: 2020-10-27

## 2020-01-02 RX ORDER — CLOPIDOGREL BISULFATE 75 MG/1
75 TABLET ORAL DAILY
Qty: 90 TABLET | Refills: 3 | Status: SHIPPED | OUTPATIENT
Start: 2020-01-02 | End: 2020-12-23

## 2020-01-02 RX ORDER — ESCITALOPRAM OXALATE 10 MG/1
10 TABLET ORAL DAILY
Qty: 90 TABLET | Refills: 2 | Status: SHIPPED | OUTPATIENT
Start: 2020-01-02 | End: 2020-09-24

## 2020-01-02 RX ORDER — ERGOCALCIFEROL 1.25 MG/1
50000 CAPSULE ORAL
Qty: 12 CAPSULE | Refills: 1 | Status: SHIPPED | OUTPATIENT
Start: 2020-01-02 | End: 2020-07-31

## 2020-01-02 RX ORDER — OMEPRAZOLE 20 MG/1
20 CAPSULE, DELAYED RELEASE ORAL DAILY
Qty: 90 CAPSULE | Refills: 2 | Status: SHIPPED | OUTPATIENT
Start: 2020-01-02 | End: 2020-09-24

## 2020-01-02 NOTE — TELEPHONE ENCOUNTER
PT CALLED TO GET REFILLS SENT TO [facility]  vitamin D (ERGOCALCIFEROL) 86143 units capsule capsule  cyanocobalamin 1000 MCG/ML injection  omeprazole (priLOSEC) 20 MG capsule  clopidogrel (PLAVIX) 75 MG tablet  escitalopram (LEXAPRO) 10 MG tablet

## 2020-04-08 RX ORDER — EZETIMIBE AND SIMVASTATIN 10; 40 MG/1; MG/1
TABLET ORAL
Qty: 30 TABLET | Refills: 6 | Status: SHIPPED | OUTPATIENT
Start: 2020-04-08 | End: 2020-10-26

## 2020-05-15 ENCOUNTER — OFFICE VISIT (OUTPATIENT)
Dept: CARDIOLOGY | Facility: CLINIC | Age: 60
End: 2020-05-15

## 2020-05-15 VITALS — BODY MASS INDEX: 30.87 KG/M2 | HEIGHT: 61 IN | WEIGHT: 163.5 LBS

## 2020-05-15 DIAGNOSIS — I25.10 CORONARY ARTERIOSCLEROSIS: ICD-10-CM

## 2020-05-15 DIAGNOSIS — I10 ESSENTIAL HYPERTENSION: Primary | ICD-10-CM

## 2020-05-15 DIAGNOSIS — E78.2 MIXED HYPERLIPIDEMIA: ICD-10-CM

## 2020-05-15 PROCEDURE — 99442 PR PHYS/QHP TELEPHONE EVALUATION 11-20 MIN: CPT | Performed by: INTERNAL MEDICINE

## 2020-05-15 NOTE — PROGRESS NOTES
"Kimberly Means  59 y.o. female      1. Essential hypertension    2. Mixed hyperlipidemia    3. Coronary arteriosclerosis        History of Present Illness:  This visit has been rescheduled as a phone visit to comply with patient safety concerns in accordance with CDC recommendations. Total time of discussion was 15 minutes.    You have chosen to receive care through a telephone visit. Do you consent to use a telephone visit for your medical care today? Yes    Kimberly Means denied chest pain, shortness of breath, palpitation, dizziness or syncope.  She has been compliant with the medication.  Blood pressure has apparently been in the normal range.  Her PCI to left anterior descending coronary artery was back in 2007.    Her LDL had optimized when checked in October 2019.    SUBJECTIVE    Allergies   Allergen Reactions   • Kiwi Extract Anaphylaxis   • Lortab [Hydrocodone-Acetaminophen] Confusion and Hallucinations   • Paxil [Paroxetine] Other (See Comments)     \"MESSES WITH MY MIND\"   • Dilaudid [Hydromorphone] Other (See Comments)     Rapid heart rate  Rapid heart rate   • Latex Rash   • Morphine And Related Itching   • Sulfa Antibiotics Hives and Rash         Past Medical History:   Diagnosis Date   • Abdominal pain    • Acute bronchitis    • Acute left otitis media    • Acute otitis externa    • Allergic rhinitis     Due to pollen   • Ankle pain    • Asthma    • Astigmatism    • Bacterial pneumonia 12/02/2015   • Contact dermatitis    • Coronary arteriosclerosis     stent to LAD 2006      • Cough    • Depressive disorder     pt states she has not suffered from depression, takes lexapro for anxiety   • Diarrhea    • Dysuria    • Encounter for Papanicolaou smear of cervix 06/01/2009   • Epigastric pain    • Epistaxis    • Essential hypertension    • Fatigue    • Fever    • Functional diarrhea    • Gastroenteritis    • Generalized anxiety disorder 01/2015    given samples of lexapro.   • Heel pain    • Hematochezia  "   • Hernia of abdominal wall 05/13/2016    incarcerated- primarily repaired. Now recurrent      • History of bone density study 05/27/2014   • History of colonoscopy     diagnostic (screening) 73899- Normal colonoscopy.;  Last Performed: 12/10/2015   • History of esophagogastroduodenoscopy     epigastric pain, gastritis without bleeding,gastroesophageal reflux without esophagitis;  Last Performed: 01/06/1999   • History of mammogram 06/29/2016   • History of screening mammography    • Hyperglycemia    • Hyperlipidemia    • Impaired glucose tolerance    • Irritable bowel syndrome    • Measles    • Mechanical complication of internal orthopedic device (CMS/[facility])    • Migraine    • Morbid obesity due to excess calories (CMS/[facility])    • Mumps    • Myopia    • Nausea    • Need for DTaP vaccine 01/17/2015   • Retinal tear    • Spasm of back muscles    • Sprain of ankle    • Umbilical hernia    • Upper respiratory infection    • Urinary tract infectious disease    • Ventral hernia     Open ventral hernioplasty. Incarcerated ventral hernia;  Last Performed: 07/17/2012   • Vitamin D deficiency    • Vitreous detachment    • Vitreous opacities          Past Surgical History:   Procedure Laterality Date   • CERVICAL CONIZATION      stress urinary incontinence,endocervical dysplasia;  Last Performed: 09/05/1989   • CHOLECYSTECTOMY  08/26/1993    Laparoscopic   • COLONOSCOPY N/A 2/17/2017    Procedure: COLONOSCOPY;  Surgeon: Young Villegas MD;  Location: [facility] ENDOSCOPY;  Service:    • CORONARY ANGIOPLASTY      stent to LAD;  Last Performed: 2006   • CORONARY ANGIOPLASTY WITH STENT PLACEMENT     • EPIGASTRIC HERNIA REPAIR  02/02/1973   • GASTRIC SLEEVE LAPAROSCOPIC  11/08/2016   • HYSTERECTOMY  09/05/1989    mmk urethropexy   • TONSILLECTOMY      postoperative tonsillectomy,transfixed bleeding vessel, right tonsillar fossa;  Last Performed: 01/05/1974   • VAGINAL DELIVERY      x 2         Family History   Problem Relation Age of  Onset   • Thyroid disease Mother    • Diabetes type II Mother    • Long QT syndrome Mother    • Hypothyroidism Mother    • Breast cancer Mother    • Thyroid disease Father    • Coronary artery disease Father    • Tremor Father    • Diabetes Father    • COPD Father    • Colon cancer Father    • Coronary artery disease Other    • Diabetes Other    • Hypertension Other          Social History     Socioeconomic History   • Marital status:      Spouse name: Milla   • Number of children: 2   • Years of education: Not on file   • Highest education level: Not on file   Occupational History   • Occupation:      Employer: [employer]   Tobacco Use   • Smoking status: Never Smoker   • Smokeless tobacco: Never Used   Substance and Sexual Activity   • Alcohol use: No   • Drug use: No   • Sexual activity: Yes     Partners: Male     Birth control/protection: Post-menopausal, Surgical         Current Outpatient Medications   Medication Sig Dispense Refill   • ALPRAZolam (XANAX) 0.5 MG tablet Take 0.5 mg by mouth. 2-3 times daily as needed for anxiety     • aspirin 81 MG tablet Take 81 mg by mouth every night.     • butalbital-aspirin-caffeine (FIORINAL) -40 MG capsule Take 1 capsule by mouth every 4 (four) hours as needed for headaches.     • clopidogrel (PLAVIX) 75 MG tablet Take 1 tablet by mouth Daily. 90 tablet 3   • cyanocobalamin 1000 MCG/ML injection Inject 1 mL into the appropriate muscle as directed by prescriber Every 28 (Twenty-Eight) Days. 1 mL 5   • escitalopram (LEXAPRO) 10 MG tablet Take 1 tablet by mouth Daily. 90 tablet 2   • ezetimibe-simvastatin (VYTORIN) 10-40 MG per tablet TAKE ONE TABLET BY MOUTH ONCE NIGHTLY 30 tablet 6   • fluconazole (DIFLUCAN) 150 MG tablet Take 1 tablet by mouth Daily. 4 tablet 0   • fluticasone (FLONASE) 50 MCG/ACT nasal spray 2 sprays into the nostril(s) as directed by provider Daily. Administer 2 sprays in each nostril for each dose. 16 g 5   •  "Insulin Pen Needle (NOVOFINE PLUS) 32G X 4 MM misc 1 Device Daily. 30 each 5   • Liraglutide -Weight Management (SAXENDA) 18 MG/3ML solution pen-injector Inject 3 mg under the skin into the appropriate area as directed Daily. 15 mL 1   • metoprolol succinate XL (TOPROL-XL) 25 MG 24 hr tablet Take 1 tablet by mouth Daily. 90 tablet 3   • omeprazole (priLOSEC) 20 MG capsule Take 1 capsule by mouth Daily. 90 capsule 2   • phenazopyridine (PYRIDIUM) 200 MG tablet Take 1 tablet by mouth 3 (Three) Times a Day As Needed for Bladder Spasms. 30 tablet 0   • vitamin D (ERGOCALCIFEROL) 1.25 MG (26861 UT) capsule capsule Take 1 capsule by mouth Every 7 (Seven) Days. 12 capsule 1     No current facility-administered medications for this visit.          OBJECTIVE    Ht 154.9 cm (61\")   Wt 74.2 kg (163 lb 8 oz)   LMP 04/11/1989 (Within Months)   BMI 30.89 kg/m²         Review of Systems     Constitutional:  Denies recent weight loss, weight gain, fever or chills, no change in exercise tolerance     HENT:  Denies any hearing loss, epistaxis, hoarseness, or difficulty speaking.     Eyes: Wears eyeglasses or contact lenses     Respiratory:  Denies dyspnea with exertion,no cough, wheezing, or hemoptysis.     Cardiovascular: Negative for palpations, chest pain, orthopnea, PND, peripheral edema, syncope, or claudication.     Gastrointestinal:  Denies change in bowel habits, dyspepsia, ulcer disease, hematochezia, or melena.     Endocrine: Negative for cold intolerance, heat intolerance, polydipsia, polyphagia and polyuria. Denies any history of weight change, heat/cold intolerance, polydipsia, polyuria     Genitourinary: Negative.      Musculoskeletal: Denies any history of arthritic symptoms or back problems     Neurological:  Denies any history of recurrent headaches, strokes, TIA, or seizure disorder.     Hematological: Denies any food allergies, seasonal allergies, bleeding disorders, or lymphadenopathy. "     Psychiatric/Behavioral: history of depression, no substance abuse, or change in cognitive function.         Lab Results   Component Value Date    WBC 7.43 12/19/2019    HGB 12.9 12/19/2019    HCT 39.7 12/19/2019    MCV 89.0 12/19/2019     12/19/2019     Lab Results   Component Value Date    GLUCOSE 86 12/19/2019    BUN 13 12/19/2019    CREATININE 0.86 12/19/2019    EGFRIFNONA 67 12/19/2019    BCR 15.1 12/19/2019    CO2 28.0 12/19/2019    CALCIUM 9.1 12/19/2019    ALBUMIN 4.00 12/19/2019    AST 16 12/19/2019    ALT 17 12/19/2019     Lab Results   Component Value Date    CHOL 153 10/25/2019    CHOL 218 (H) 04/16/2019    CHOL 218 (H) 10/12/2018     Lab Results   Component Value Date    TRIG 97 10/25/2019    TRIG 195 (H) 04/16/2019    TRIG 222 (H) 10/12/2018     Lab Results   Component Value Date    HDL 58 10/25/2019    HDL 59 04/16/2019    HDL 46 (L) 10/12/2018     No components found for: LDLCALC  Lab Results   Component Value Date    LDL 76 10/25/2019     (H) 04/16/2019     10/12/2018     No results found for: HDLLDLRATIO  No components found for: CHOLHDL  Lab Results   Component Value Date    HGBA1C 5.40 09/23/2019     Lab Results   Component Value Date    TSH 1.330 09/23/2019           ASSESSMENT AND PLAN  Kimberly Means is stable with regard to her heart with no clinical evidence of progression of coronary artery disease.  I have continued antiplatelet therapy with aspirin and Plavix, antihypertensive therapy with metoprolol succinate and lipid-lowering therapy with Vytorin has been continued.      Diagnoses and all orders for this visit:    Essential hypertension    Mixed hyperlipidemia    Coronary arteriosclerosis        Patient's Body mass index is 30.89 kg/m². BMI is above normal parameters. Recommendations include: exercise counseling and nutrition counseling.  Patient is a non-smoker    Nallely Foster MD  5/15/2020  09:11

## 2020-06-23 DIAGNOSIS — E66.09 CLASS 1 OBESITY DUE TO EXCESS CALORIES WITH SERIOUS COMORBIDITY AND BODY MASS INDEX (BMI) OF 30.0 TO 30.9 IN ADULT: ICD-10-CM

## 2020-06-23 RX ORDER — LIRAGLUTIDE 6 MG/ML
INJECTION, SOLUTION SUBCUTANEOUS
Qty: 15 PEN | Refills: 0 | OUTPATIENT
Start: 2020-06-23 | End: 2020-07-21

## 2020-06-26 RX ORDER — METOPROLOL SUCCINATE 25 MG/1
TABLET, EXTENDED RELEASE ORAL
Qty: 90 TABLET | Refills: 2 | Status: SHIPPED | OUTPATIENT
Start: 2020-06-26 | End: 2021-02-04 | Stop reason: SDUPTHER

## 2020-07-25 ENCOUNTER — HOSPITAL ENCOUNTER (EMERGENCY)
Facility: HOSPITAL | Age: 60
Discharge: HOME | End: 2020-07-25
Attending: FAMILY MEDICINE | Admitting: FAMILY MEDICINE

## 2020-07-25 VITALS
WEIGHT: 164.2 LBS | HEIGHT: 61 IN | DIASTOLIC BLOOD PRESSURE: 70 MMHG | RESPIRATION RATE: 16 BRPM | TEMPERATURE: 97.9 F | OXYGEN SATURATION: 100 % | BODY MASS INDEX: 31 KG/M2 | SYSTOLIC BLOOD PRESSURE: 112 MMHG | HEART RATE: 78 BPM

## 2020-07-25 DIAGNOSIS — H11.31 SUBCONJUNCTIVAL HEMORRHAGE OF RIGHT EYE: Primary | ICD-10-CM

## 2020-07-25 PROCEDURE — 99283 EMERGENCY DEPT VISIT LOW MDM: CPT

## 2020-07-25 NOTE — ED PROVIDER NOTES
Subjective   Patient has bleeding in the conjunctive her right eye x3 days, that has worsened.  She states that she was doing some work in the house that involved the siding and the fascia and admits that she was not wearing safety glasses and may have had some of the material into her right eye.  She denies any other trauma/injury.  She has a past medical history significant for retinal detachment that was repaired in [place].  She is currently taking aspirin and Plavix for a heart stent that was placed 8 years ago.  She does not have a smoking history or history of diabetes.  He states that today she developed some mild cloudy vision.  She denies any significant eye pain.  She normally wears contacts but is not wearing them currently.         Eye Problem   Location:  Right eye  Onset quality:  Unable to specify  Duration:  3 days  Timing:  Constant  Progression:  Worsening  Chronicity:  New  Relieved by:  Nothing  Worsened by:  Nothing  Ineffective treatments:  None tried  Associated symptoms: blurred vision and redness    Associated symptoms: no discharge, no headaches, no nausea, no photophobia, no swelling, no vomiting and no weakness        Review of Systems   Constitutional: Negative for appetite change, chills, diaphoresis, fatigue and fever.   HENT: Negative for congestion, ear discharge, ear pain, nosebleeds, rhinorrhea, sinus pressure, sore throat and trouble swallowing.    Eyes: Positive for blurred vision, redness and visual disturbance. Negative for photophobia and discharge.   Respiratory: Negative for apnea, cough, chest tightness, shortness of breath and wheezing.    Cardiovascular: Negative for chest pain.   Gastrointestinal: Negative for abdominal pain, diarrhea, nausea and vomiting.   Endocrine: Negative for polyuria.   Genitourinary: Negative for dysuria, frequency and urgency.   Musculoskeletal: Negative for myalgias and neck pain.   Skin: Negative for color change and rash.    Allergic/Immunologic: Negative for immunocompromised state.   Neurological: Negative for dizziness, seizures, syncope, weakness, light-headedness and headaches.   Hematological: Negative for adenopathy. Does not bruise/bleed easily.   Psychiatric/Behavioral: Negative for behavioral problems and confusion.   All other systems reviewed and are negative.      Past Medical History:   Diagnosis Date   • Abdominal pain    • Acute bronchitis    • Acute left otitis media    • Acute otitis externa    • Allergic rhinitis     Due to pollen   • Ankle pain    • Asthma    • Astigmatism    • Bacterial pneumonia 12/02/2015   • Contact dermatitis    • Coronary arteriosclerosis     stent to LAD 2006      • Cough    • Depressive disorder     pt states she has not suffered from depression, takes lexapro for anxiety   • Diarrhea    • Dysuria    • Encounter for Papanicolaou smear of cervix 06/01/2009   • Epigastric pain    • Epistaxis    • Essential hypertension    • Fatigue    • Fever    • Functional diarrhea    • Gastroenteritis    • Generalized anxiety disorder 01/2015    given samples of lexapro.   • Heel pain    • Hematochezia    • Hernia of abdominal wall 05/13/2016    incarcerated- primarily repaired. Now recurrent      • History of bone density study 05/27/2014   • History of colonoscopy     diagnostic (screening) 65478- Normal colonoscopy.;  Last Performed: 12/10/2015   • History of esophagogastroduodenoscopy     epigastric pain, gastritis without bleeding,gastroesophageal reflux without esophagitis;  Last Performed: 01/06/1999   • History of mammogram 06/29/2016   • History of screening mammography    • Hyperglycemia    • Hyperlipidemia    • Impaired glucose tolerance    • Irritable bowel syndrome    • Measles    • Mechanical complication of internal orthopedic device (CMS/HCC)    • Migraine    • Morbid obesity due to excess calories (CMS/HCC)    • Mumps    • Myopia    • Nausea    • Need for DTaP vaccine 01/17/2015   •  "Retinal tear    • Spasm of back muscles    • Sprain of ankle    • Umbilical hernia    • Upper respiratory infection    • Urinary tract infectious disease    • Ventral hernia     Open ventral hernioplasty. Incarcerated ventral hernia;  Last Performed: 07/17/2012   • Vitamin D deficiency    • Vitreous detachment    • Vitreous opacities        Allergies   Allergen Reactions   • Kiwi Extract Anaphylaxis   • Lortab [Hydrocodone-Acetaminophen] Confusion and Hallucinations   • Paxil [Paroxetine] Other (See Comments)     \"MESSES WITH MY MIND\"   • Dilaudid [Hydromorphone] Other (See Comments)     Rapid heart rate  Rapid heart rate   • Latex Rash   • Morphine And Related Itching   • Sulfa Antibiotics Hives and Rash       Past Surgical History:   Procedure Laterality Date   • CERVICAL CONIZATION      stress urinary incontinence,endocervical dysplasia;  Last Performed: 09/05/1989   • CHOLECYSTECTOMY  08/26/1993    Laparoscopic   • COLONOSCOPY N/A 2/17/2017    Procedure: COLONOSCOPY;  Surgeon: Young Villegas MD;  Location: [facility] ENDOSCOPY;  Service:    • CORONARY ANGIOPLASTY      stent to LAD;  Last Performed: 2006   • CORONARY ANGIOPLASTY WITH STENT PLACEMENT     • EPIGASTRIC HERNIA REPAIR  02/02/1973   • GASTRIC SLEEVE LAPAROSCOPIC  11/08/2016   • HYSTERECTOMY  09/05/1989    mmk urethropexy   • TONSILLECTOMY      postoperative tonsillectomy,transfixed bleeding vessel, right tonsillar fossa;  Last Performed: 01/05/1974   • VAGINAL DELIVERY      x 2       Family History   Problem Relation Age of Onset   • Thyroid disease Mother    • Diabetes type II Mother    • Long QT syndrome Mother    • Hypothyroidism Mother    • Breast cancer Mother    • Thyroid disease Father    • Coronary artery disease Father    • Tremor Father    • Diabetes Father    • COPD Father    • Colon cancer Father    • Coronary artery disease Other    • Diabetes Other    • Hypertension Other        Social History     Socioeconomic History   • Marital status: "      Spouse name: Milla   • Number of children: 2   • Years of education: Not on file   • Highest education level: Not on file   Occupational History   • Occupation:      Employer: [employer]   Tobacco Use   • Smoking status: Never Smoker   • Smokeless tobacco: Never Used   Substance and Sexual Activity   • Alcohol use: No   • Drug use: No   • Sexual activity: Yes     Partners: Male     Birth control/protection: Post-menopausal, Surgical           Objective   Physical Exam   Constitutional: She is oriented to person, place, and time. She appears well-developed and well-nourished.   HENT:   Head: Normocephalic and atraumatic.   Nose: Nose normal.   Mouth/Throat: Oropharynx is clear and moist.   Eyes: Pupils are equal, round, and reactive to light. EOM are normal. Right eye exhibits no discharge. Left eye exhibits no discharge. Right conjunctiva has a hemorrhage. No scleral icterus.       The right eye was evaluated with the slit lamp.  There is blood noted in the subconjunctival area medially, but there does  not appear to be any area of active bleeding or foreign body.   Neck: Normal range of motion. Neck supple. No tracheal deviation present.   Cardiovascular: Normal rate, regular rhythm and normal heart sounds.   No murmur heard.  Pulmonary/Chest: Effort normal and breath sounds normal. No stridor. No respiratory distress. She has no wheezes. She has no rales.   Abdominal: Soft. Bowel sounds are normal. She exhibits no distension and no mass. There is no tenderness. There is no rebound and no guarding.   Musculoskeletal: She exhibits no edema.   Neurological: She is alert and oriented to person, place, and time. Coordination normal.   Skin: Skin is warm and dry. No rash noted. No erythema.   Psychiatric: She has a normal mood and affect. Her behavior is normal. Thought content normal.   Nursing note and vitals reviewed.      Procedures           ED Course  ED Course as of Jul 25  1757   Sat Jul 25, 2020 1746 Patient was offered a transfer to another hospital ([facility]) for further assessment of her conjunctival hemorrhage.  On physical exam with slit lamp, there did not appear to be any bleeding inside the globe.  All of the bleeding appeared to be external.  Patient states that she wants to go home and will follow-up with ophthalmology on Monday and return to the emergency department should her symptoms worsen or should they not continue to improve at home.  She is advised to hold the aspirin and Plavix for 1 day to assess whether there is improvement in the hemorrhage.    [CB]      ED Course User Index  [CB] lLoyd Weiss MD                   Labs Reviewed - No data to display    No orders to display                                    MDM    Final diagnoses:   Subconjunctival hemorrhage of right eye            Lloyd Weiss MD  07/25/20 1757

## 2020-07-31 RX ORDER — ERGOCALCIFEROL 1.25 MG/1
CAPSULE ORAL
Qty: 12 CAPSULE | Refills: 0 | Status: SHIPPED | OUTPATIENT
Start: 2020-07-31 | End: 2020-10-19 | Stop reason: SDUPTHER

## 2020-09-24 DIAGNOSIS — F41.1 GENERALIZED ANXIETY DISORDER: ICD-10-CM

## 2020-09-24 RX ORDER — ESCITALOPRAM OXALATE 10 MG/1
TABLET ORAL
Qty: 90 TABLET | Refills: 1 | Status: SHIPPED | OUTPATIENT
Start: 2020-09-24 | End: 2021-02-04 | Stop reason: SDUPTHER

## 2020-09-24 RX ORDER — OMEPRAZOLE 20 MG/1
CAPSULE, DELAYED RELEASE ORAL
Qty: 90 CAPSULE | Refills: 1 | Status: SHIPPED | OUTPATIENT
Start: 2020-09-24 | End: 2021-02-04 | Stop reason: SDUPTHER

## 2020-10-03 DIAGNOSIS — E66.09 CLASS 1 OBESITY DUE TO EXCESS CALORIES WITH SERIOUS COMORBIDITY AND BODY MASS INDEX (BMI) OF 30.0 TO 30.9 IN ADULT: ICD-10-CM

## 2020-10-06 RX ORDER — ERGOCALCIFEROL 1.25 MG/1
CAPSULE ORAL
Qty: 12 CAPSULE | Refills: 0 | OUTPATIENT
Start: 2020-10-06

## 2020-10-06 RX ORDER — LIRAGLUTIDE 6 MG/ML
INJECTION, SOLUTION SUBCUTANEOUS
Qty: 15 PEN | Refills: 1 | OUTPATIENT
Start: 2020-10-06

## 2020-10-17 DIAGNOSIS — E66.09 CLASS 1 OBESITY DUE TO EXCESS CALORIES WITH SERIOUS COMORBIDITY AND BODY MASS INDEX (BMI) OF 30.0 TO 30.9 IN ADULT: ICD-10-CM

## 2020-10-19 ENCOUNTER — TELEPHONE (OUTPATIENT)
Dept: FAMILY MEDICINE CLINIC | Facility: CLINIC | Age: 60
End: 2020-10-19

## 2020-10-19 RX ORDER — ERGOCALCIFEROL 1.25 MG/1
CAPSULE ORAL
Qty: 12 CAPSULE | Refills: 0 | OUTPATIENT
Start: 2020-10-19

## 2020-10-19 RX ORDER — LIRAGLUTIDE 6 MG/ML
INJECTION, SOLUTION SUBCUTANEOUS
Refills: 1 | OUTPATIENT
Start: 2020-10-19

## 2020-10-19 RX ORDER — ERGOCALCIFEROL 1.25 MG/1
50000 CAPSULE ORAL WEEKLY
Qty: 12 CAPSULE | Refills: 2 | Status: SHIPPED | OUTPATIENT
Start: 2020-10-19 | End: 2020-11-03 | Stop reason: SDUPTHER

## 2020-10-19 NOTE — TELEPHONE ENCOUNTER
Kimberly said, she missed her last apt accidentally.  I made her one for 11-3, but she needs her Vitamin D and Xazenda sent to [facility].   She is out.

## 2020-10-24 DIAGNOSIS — E78.2 MIXED HYPERLIPIDEMIA: Primary | ICD-10-CM

## 2020-10-26 RX ORDER — EZETIMIBE AND SIMVASTATIN 10; 40 MG/1; MG/1
TABLET ORAL
Qty: 90 TABLET | Refills: 3 | Status: SHIPPED | OUTPATIENT
Start: 2020-10-26 | End: 2021-02-04 | Stop reason: SDUPTHER

## 2020-10-27 DIAGNOSIS — Z98.890 STATUS POST GASTRIC SURGERY: ICD-10-CM

## 2020-10-27 RX ORDER — CYANOCOBALAMIN 1000 UG/ML
INJECTION, SOLUTION INTRAMUSCULAR; SUBCUTANEOUS
Qty: 1 ML | Refills: 4 | Status: SHIPPED | OUTPATIENT
Start: 2020-10-27 | End: 2020-11-03 | Stop reason: SDUPTHER

## 2020-11-03 ENCOUNTER — TELEPHONE (OUTPATIENT)
Dept: FAMILY MEDICINE CLINIC | Facility: CLINIC | Age: 60
End: 2020-11-03

## 2020-11-03 ENCOUNTER — OFFICE VISIT (OUTPATIENT)
Dept: FAMILY MEDICINE CLINIC | Facility: CLINIC | Age: 60
End: 2020-11-03

## 2020-11-03 VITALS
WEIGHT: 171.6 LBS | BODY MASS INDEX: 32.4 KG/M2 | DIASTOLIC BLOOD PRESSURE: 76 MMHG | SYSTOLIC BLOOD PRESSURE: 106 MMHG | HEIGHT: 61 IN

## 2020-11-03 DIAGNOSIS — E53.8 VITAMIN B 12 DEFICIENCY: ICD-10-CM

## 2020-11-03 DIAGNOSIS — E66.09 CLASS 1 OBESITY DUE TO EXCESS CALORIES WITH SERIOUS COMORBIDITY AND BODY MASS INDEX (BMI) OF 32.0 TO 32.9 IN ADULT: ICD-10-CM

## 2020-11-03 DIAGNOSIS — Z13.29 SCREENING FOR HYPOTHYROIDISM: ICD-10-CM

## 2020-11-03 DIAGNOSIS — Z23 NEED FOR SHINGLES VACCINE: ICD-10-CM

## 2020-11-03 DIAGNOSIS — E55.9 VITAMIN D DEFICIENCY: ICD-10-CM

## 2020-11-03 DIAGNOSIS — I10 ESSENTIAL HYPERTENSION: Primary | ICD-10-CM

## 2020-11-03 DIAGNOSIS — E78.00 HYPERCHOLESTEREMIA: ICD-10-CM

## 2020-11-03 PROCEDURE — 90750 HZV VACC RECOMBINANT IM: CPT | Performed by: NURSE PRACTITIONER

## 2020-11-03 PROCEDURE — 90471 IMMUNIZATION ADMIN: CPT | Performed by: NURSE PRACTITIONER

## 2020-11-03 PROCEDURE — 99214 OFFICE O/P EST MOD 30 MIN: CPT | Performed by: NURSE PRACTITIONER

## 2020-11-03 RX ORDER — NYSTATIN 100000 U/G
1 CREAM TOPICAL AS NEEDED
COMMUNITY
Start: 2020-10-22

## 2020-11-03 RX ORDER — CYANOCOBALAMIN 1000 UG/ML
1000 INJECTION, SOLUTION INTRAMUSCULAR; SUBCUTANEOUS
Qty: 1 ML | Refills: 6 | Status: SHIPPED | OUTPATIENT
Start: 2020-11-03 | End: 2021-11-23

## 2020-11-03 RX ORDER — ERGOCALCIFEROL 1.25 MG/1
50000 CAPSULE ORAL 2 TIMES WEEKLY
Qty: 24 CAPSULE | Refills: 2 | Status: SHIPPED | OUTPATIENT
Start: 2020-11-05 | End: 2021-02-04 | Stop reason: SDUPTHER

## 2020-11-03 NOTE — TELEPHONE ENCOUNTER
PATIENT CALLED STATING THAT SHE WAS TOLD BY ATUL THAT IF SHE REMEMBER WHAT SHE NEEDED TO ASK TO GIVE HER A CALL BACK ABOUT HER NOSE BLEEDS.    WORK PHONE:  [phone]     HOME:  [phone]

## 2020-11-03 NOTE — PROGRESS NOTES
Subjective   Kimberly Means is a 60 y.o. female.   Follow-up for HTN, high cholesterol, depression and obesity.  Currently on Saxenda for weight loss.  Needs refills on all of her medications.    Hypertension  This is a chronic problem. The current episode started more than 1 year ago. The problem is unchanged. The problem is controlled. There are no associated agents to hypertension. Risk factors for coronary artery disease include dyslipidemia, family history, post-menopausal state, stress and obesity. Past treatments include beta blockers. Current antihypertension treatment includes beta blockers. The current treatment provides significant improvement. There are no compliance problems.  Hypertensive end-organ damage includes CAD/MI.   Hyperlipidemia  This is a chronic problem. The current episode started more than 1 year ago. The problem is controlled. Recent lipid tests were reviewed and are high. Exacerbating diseases include obesity. Factors aggravating her hyperlipidemia include beta blockers and fatty foods. Current antihyperlipidemic treatment includes statins and ezetimibe. The current treatment provides moderate improvement of lipids. There are no compliance problems.  Risk factors for coronary artery disease include dyslipidemia, family history, hypertension, obesity and post-menopausal.   Depression  Visit Type: follow-up  Patient presents with the following symptoms: decreased concentration and depressed mood.  Patient is not experiencing: confusion, suicidal ideas and thoughts of death.  Frequency of symptoms: occasionally   Severity: mild   Sleep quality: good  Nighttime awakenings: occasional  Compliance with medications:  %        Obesity  This is a chronic problem. The current episode started more than 1 year ago. The problem occurs constantly. The problem has been gradually worsening. Pertinent negatives include no chills, diaphoresis, fatigue or fever. The symptoms are aggravated by  drinking and eating. Treatments tried: gastric sleeve and Saxenda  The treatment provided no relief.        The following portions of the patient's history were reviewed and updated as appropriate:     Current Outpatient Medications   Medication Sig Dispense Refill   • aspirin 81 MG tablet Take 81 mg by mouth every night.     • butalbital-aspirin-caffeine (FIORINAL) -40 MG capsule Take 1 capsule by mouth every 4 (four) hours as needed for headaches.     • clopidogrel (PLAVIX) 75 MG tablet Take 1 tablet by mouth Daily. 90 tablet 3   • cyanocobalamin 1000 MCG/ML injection Inject 1 mL into the appropriate muscle as directed by prescriber Every 30 (Thirty) Days. 1 mL 6   • escitalopram (LEXAPRO) 10 MG tablet TAKE ONE TABLET BY MOUTH DAILY 90 tablet 1   • ezetimibe-simvastatin (VYTORIN) 10-40 MG per tablet TAKE ONE TABLET BY MOUTH ONCE NIGHTLY 90 tablet 3   • fluticasone (FLONASE) 50 MCG/ACT nasal spray 2 sprays into the nostril(s) as directed by provider Daily. Administer 2 sprays in each nostril for each dose. 16 g 5   • Insulin Pen Needle (NOVOFINE PLUS) 32G X 4 MM misc 1 Device Daily. 30 each 5   • Liraglutide -Weight Management 18 MG/3ML solution pen-injector Inject 3 mg under the skin into the appropriate area as directed Daily. 5 pen 3   • metoprolol succinate XL (TOPROL-XL) 25 MG 24 hr tablet TAKE ONE TABLET BY MOUTH DAILY 90 tablet 2   • nystatin (MYCOSTATIN) 097751 UNIT/GM cream      • omeprazole (priLOSEC) 20 MG capsule TAKE ONE CAPSULE BY MOUTH DAILY 90 capsule 1   • [START ON 11/5/2020] vitamin D (ERGOCALCIFEROL) 1.25 MG (97429 UT) capsule capsule Take 1 capsule by mouth 2 (Two) Times a Week. 24 capsule 2     No current facility-administered medications for this visit.      Current Outpatient Medications on File Prior to Visit   Medication Sig   • aspirin 81 MG tablet Take 81 mg by mouth every night.   • butalbital-aspirin-caffeine (FIORINAL) -40 MG capsule Take 1 capsule by mouth every 4 (four)  hours as needed for headaches.   • clopidogrel (PLAVIX) 75 MG tablet Take 1 tablet by mouth Daily.   • escitalopram (LEXAPRO) 10 MG tablet TAKE ONE TABLET BY MOUTH DAILY   • ezetimibe-simvastatin (VYTORIN) 10-40 MG per tablet TAKE ONE TABLET BY MOUTH ONCE NIGHTLY   • fluticasone (FLONASE) 50 MCG/ACT nasal spray 2 sprays into the nostril(s) as directed by provider Daily. Administer 2 sprays in each nostril for each dose.   • Insulin Pen Needle (NOVOFINE PLUS) 32G X 4 MM misc 1 Device Daily.   • metoprolol succinate XL (TOPROL-XL) 25 MG 24 hr tablet TAKE ONE TABLET BY MOUTH DAILY   • nystatin (MYCOSTATIN) 974310 UNIT/GM cream    • omeprazole (priLOSEC) 20 MG capsule TAKE ONE CAPSULE BY MOUTH DAILY   • [DISCONTINUED] cyanocobalamin 1000 MCG/ML injection INJECT 1 ML INTO THE APPROPRIATE MUSCLE AS DIRECTED BY THE PRESCRIBER EVERY 28 DAYS   • [DISCONTINUED] Liraglutide -Weight Management 18 MG/3ML solution pen-injector Inject 3 mg under the skin into the appropriate area as directed Daily.   • [DISCONTINUED] vitamin D (ERGOCALCIFEROL) 1.25 MG (51031 UT) capsule capsule Take 1 capsule by mouth 1 (One) Time Per Week. (Patient taking differently: Take 50,000 Units by mouth 2 (Two) Times a Week.)   • [DISCONTINUED] phenazopyridine (PYRIDIUM) 200 MG tablet Take 1 tablet by mouth 3 (Three) Times a Day As Needed for Bladder Spasms.     No current facility-administered medications on file prior to visit.      She is allergic to kiwi extract; lortab [hydrocodone-acetaminophen]; paxil [paroxetine]; dilaudid [hydromorphone]; latex; morphine and related; and sulfa antibiotics..    Review of Systems   Constitutional: Negative.  Negative for chills, diaphoresis, fatigue and fever.   HENT: Negative.    Respiratory: Negative.    Cardiovascular: Negative.    Gastrointestinal: Negative.    Musculoskeletal: Negative.    Skin: Negative.    Neurological: Negative.    Psychiatric/Behavioral: Positive for decreased concentration. Negative for  "confusion and suicidal ideas.       Objective    Visit Vitals  /76   Ht 154.9 cm (61\")   Wt 77.8 kg (171 lb 9.6 oz)   LMP 04/11/1989 (Within Months)   BMI 32.42 kg/m²       Physical Exam  Vitals signs and nursing note reviewed.   Constitutional:       Appearance: She is well-developed.   HENT:      Head: Normocephalic.   Neck:      Musculoskeletal: Normal range of motion and neck supple.   Cardiovascular:      Rate and Rhythm: Normal rate and regular rhythm.      Heart sounds: Normal heart sounds.   Pulmonary:      Effort: Pulmonary effort is normal.      Breath sounds: Normal breath sounds.   Abdominal:      General: Bowel sounds are normal.      Palpations: Abdomen is soft.   Musculoskeletal: Normal range of motion.   Skin:     General: Skin is warm.      Capillary Refill: Capillary refill takes less than 2 seconds.   Neurological:      Mental Status: She is alert and oriented to person, place, and time.   Psychiatric:         Behavior: Behavior normal.         Assessment/Plan   Problems Addressed this Visit        Cardiovascular and Mediastinum    Essential hypertension - Primary    Relevant Orders    CBC & Differential    Comprehensive Metabolic Panel       Digestive    Vitamin D deficiency    Relevant Medications    vitamin D (ERGOCALCIFEROL) 1.25 MG (54188 UT) capsule capsule (Start on 11/5/2020)    Other Relevant Orders    Vitamin D 25 Hydroxy      Other Visit Diagnoses     Hypercholesteremia        Relevant Orders    Lipid Panel    Class 1 obesity due to excess calories with serious comorbidity and body mass index (BMI) of 32.0 to 32.9 in adult        Vitamin B 12 deficiency        Relevant Medications    cyanocobalamin 1000 MCG/ML injection    Other Relevant Orders    Vitamin B12    Need for shingles vaccine        Relevant Orders    Shingrix Vaccine (Completed)    Screening for hypothyroidism        Relevant Orders    TSH      Diagnoses       Codes Comments    Essential hypertension    -  Primary " ICD-10-CM: I10  ICD-9-CM: 401.9     Hypercholesteremia     ICD-10-CM: E78.00  ICD-9-CM: 272.0     Class 1 obesity due to excess calories with serious comorbidity and body mass index (BMI) of 32.0 to 32.9 in adult     ICD-10-CM: E66.09, Z68.32  ICD-9-CM: 278.00, V85.32     Vitamin B 12 deficiency     ICD-10-CM: E53.8  ICD-9-CM: 266.2     Vitamin D deficiency     ICD-10-CM: E55.9  ICD-9-CM: 268.9     Need for shingles vaccine     ICD-10-CM: Z23  ICD-9-CM: V04.89     Screening for hypothyroidism     ICD-10-CM: Z13.29  ICD-9-CM: V77.0         New Medications Ordered This Visit   Medications   • vitamin D (ERGOCALCIFEROL) 1.25 MG (35481 UT) capsule capsule     Sig: Take 1 capsule by mouth 2 (Two) Times a Week.     Dispense:  24 capsule     Refill:  2   • cyanocobalamin 1000 MCG/ML injection     Sig: Inject 1 mL into the appropriate muscle as directed by prescriber Every 30 (Thirty) Days.     Dispense:  1 mL     Refill:  6   • Liraglutide -Weight Management 18 MG/3ML solution pen-injector     Sig: Inject 3 mg under the skin into the appropriate area as directed Daily.     Dispense:  5 pen     Refill:  3     1.  Essential hypertension:  Complete CBC and chemistry panel as ordered and will notify of results when available  Continue on metoprolol succinate as previously prescribed  Courage to continue to adhere to a low-sodium diet no more than 2000 mg/day  Discussed using a salt substitute such as Mrs. Babcock as opposed to table salt to improve adherence to a low-sodium diet    2.  Hypercholesterolemia:  Complete fasting lipid panel as ordered and will notify of results when available  Continue on Vytorin as previously prescribed and refill prescription sent to pharmacy  Educated on possible side effects of this medication including but not limited to increased risk for myalgias and elevated liver enzymes  Encouraged to continue on low-fat diet  Discussed ways to reduce saturated fats in diet such as choosing lean meat like  chicken and fish as opposed to fatty red meat    3.  Class I obesity due to excess calories with serious comorbidity and a body mass index of 32.0-32.9 in adult:  Encouraged to continue on reduce caloric intake of no more than 1800 liliam/day  Continue on Saxenda as previously prescribed and refill prescription sent to pharmacy  Reeducated on possible side effects of this medication including but not limited to increased risk for pancreatitis and medullary thyroid cancer  Discussed importance of adhering to a minimum exercise regimen of 150 minutes/week  Body mass index is 32.42 kg/m².    4.  Vitamin D B12 deficiency:  Complete vitamin B12 level as ordered and will notify results when available  Continue on vitamin B12 injections monthly as previously prescribed and refill prescription sent to pharmacy    5.  Vitamin D deficiency:  Complete vitamin D level as ordered and will notify of results when available  Continue on vitamin D supplement as previously prescribed and refill prescription sent to pharmacy    6.  Need for shingles vaccine:  Shingrix vaccine second dose given IM in office  Educated on possible side effects of this medication including but not limited to increased risk for injection site reaction    7.  Screening for hypothyroidism:  Complete TSH as ordered and will notify of results when available    Continue on current medications as previously prescribed   I spent 25 minutes in direct face to face contact with patient.  Greater than 50% of this time was spent counseling patient and discussing plan of care.  Return in about 3 months (around 2/3/2021) for Annual physical.        This document has been electronically signed by NICOLLE Gomez on November 3, 2020 13:45 CST

## 2020-11-05 NOTE — TELEPHONE ENCOUNTER
She said that she has been having them since back during the summer. Normally 2-3 times weekly, sometimes they are light but most of the time it is a lot of blood with clots.

## 2020-11-16 ENCOUNTER — LAB (OUTPATIENT)
Dept: LAB | Facility: HOSPITAL | Age: 60
End: 2020-11-16

## 2020-11-16 DIAGNOSIS — E53.8 VITAMIN B 12 DEFICIENCY: ICD-10-CM

## 2020-11-16 DIAGNOSIS — Z13.29 SCREENING FOR HYPOTHYROIDISM: ICD-10-CM

## 2020-11-16 DIAGNOSIS — I10 ESSENTIAL HYPERTENSION: ICD-10-CM

## 2020-11-16 DIAGNOSIS — E55.9 VITAMIN D DEFICIENCY: ICD-10-CM

## 2020-11-16 DIAGNOSIS — E78.00 HYPERCHOLESTEREMIA: ICD-10-CM

## 2020-11-16 LAB
25(OH)D3 SERPL-MCNC: 45.3 NG/ML (ref 30–100)
ALBUMIN SERPL-MCNC: 4 G/DL (ref 3.5–5.2)
ALBUMIN/GLOB SERPL: 1.5 G/DL
ALP SERPL-CCNC: 72 U/L (ref 39–117)
ALT SERPL W P-5'-P-CCNC: 15 U/L (ref 1–33)
ANION GAP SERPL CALCULATED.3IONS-SCNC: 6 MMOL/L (ref 5–15)
AST SERPL-CCNC: 16 U/L (ref 1–32)
BASOPHILS # BLD AUTO: 0.05 10*3/MM3 (ref 0–0.2)
BASOPHILS NFR BLD AUTO: 0.8 % (ref 0–1.5)
BILIRUB SERPL-MCNC: 0.6 MG/DL (ref 0–1.2)
BUN SERPL-MCNC: 10 MG/DL (ref 8–23)
BUN/CREAT SERPL: 15.2 (ref 7–25)
CALCIUM SPEC-SCNC: 9.2 MG/DL (ref 8.6–10.5)
CHLORIDE SERPL-SCNC: 105 MMOL/L (ref 98–107)
CHOLEST SERPL-MCNC: 140 MG/DL (ref 0–200)
CO2 SERPL-SCNC: 29 MMOL/L (ref 22–29)
CREAT SERPL-MCNC: 0.66 MG/DL (ref 0.57–1)
DEPRECATED RDW RBC AUTO: 42.4 FL (ref 37–54)
EOSINOPHIL # BLD AUTO: 0.13 10*3/MM3 (ref 0–0.4)
EOSINOPHIL NFR BLD AUTO: 2.1 % (ref 0.3–6.2)
ERYTHROCYTE [DISTWIDTH] IN BLOOD BY AUTOMATED COUNT: 13.1 % (ref 12.3–15.4)
GFR SERPL CREATININE-BSD FRML MDRD: 91 ML/MIN/1.73
GLOBULIN UR ELPH-MCNC: 2.7 GM/DL
GLUCOSE SERPL-MCNC: 87 MG/DL (ref 65–99)
HCT VFR BLD AUTO: 41.2 % (ref 34–46.6)
HDLC SERPL-MCNC: 67 MG/DL (ref 40–60)
HGB BLD-MCNC: 13.6 G/DL (ref 12–15.9)
IMM GRANULOCYTES # BLD AUTO: 0.02 10*3/MM3 (ref 0–0.05)
IMM GRANULOCYTES NFR BLD AUTO: 0.3 % (ref 0–0.5)
LDLC SERPL CALC-MCNC: 56 MG/DL (ref 0–100)
LDLC/HDLC SERPL: 0.81 {RATIO}
LYMPHOCYTES # BLD AUTO: 2.03 10*3/MM3 (ref 0.7–3.1)
LYMPHOCYTES NFR BLD AUTO: 32.4 % (ref 19.6–45.3)
MCH RBC QN AUTO: 29.6 PG (ref 26.6–33)
MCHC RBC AUTO-ENTMCNC: 33 G/DL (ref 31.5–35.7)
MCV RBC AUTO: 89.6 FL (ref 79–97)
MONOCYTES # BLD AUTO: 0.36 10*3/MM3 (ref 0.1–0.9)
MONOCYTES NFR BLD AUTO: 5.7 % (ref 5–12)
NEUTROPHILS NFR BLD AUTO: 3.68 10*3/MM3 (ref 1.7–7)
NEUTROPHILS NFR BLD AUTO: 58.7 % (ref 42.7–76)
NRBC BLD AUTO-RTO: 0 /100 WBC (ref 0–0.2)
PLATELET # BLD AUTO: 225 10*3/MM3 (ref 140–450)
PMV BLD AUTO: 9.8 FL (ref 6–12)
POTASSIUM SERPL-SCNC: 3.9 MMOL/L (ref 3.5–5.2)
PROT SERPL-MCNC: 6.7 G/DL (ref 6–8.5)
RBC # BLD AUTO: 4.6 10*6/MM3 (ref 3.77–5.28)
SODIUM SERPL-SCNC: 140 MMOL/L (ref 136–145)
TRIGL SERPL-MCNC: 94 MG/DL (ref 0–150)
TSH SERPL DL<=0.05 MIU/L-ACNC: 1.41 UIU/ML (ref 0.27–4.2)
VIT B12 BLD-MCNC: 591 PG/ML (ref 211–946)
VLDLC SERPL-MCNC: 17 MG/DL (ref 5–40)
WBC # BLD AUTO: 6.27 10*3/MM3 (ref 3.4–10.8)

## 2020-11-16 PROCEDURE — 85025 COMPLETE CBC W/AUTO DIFF WBC: CPT

## 2020-11-16 PROCEDURE — 80061 LIPID PANEL: CPT

## 2020-11-16 PROCEDURE — 82607 VITAMIN B-12: CPT

## 2020-11-16 PROCEDURE — 84443 ASSAY THYROID STIM HORMONE: CPT

## 2020-11-16 PROCEDURE — 82306 VITAMIN D 25 HYDROXY: CPT

## 2020-11-16 PROCEDURE — 80053 COMPREHEN METABOLIC PANEL: CPT

## 2020-11-17 ENCOUNTER — TELEPHONE (OUTPATIENT)
Dept: FAMILY MEDICINE CLINIC | Facility: CLINIC | Age: 60
End: 2020-11-17

## 2020-11-17 ENCOUNTER — OFFICE VISIT (OUTPATIENT)
Dept: CARDIOLOGY | Facility: CLINIC | Age: 60
End: 2020-11-17

## 2020-11-17 VITALS
HEART RATE: 68 BPM | OXYGEN SATURATION: 98 % | DIASTOLIC BLOOD PRESSURE: 80 MMHG | SYSTOLIC BLOOD PRESSURE: 128 MMHG | WEIGHT: 168 LBS | HEIGHT: 61 IN | BODY MASS INDEX: 31.72 KG/M2

## 2020-11-17 DIAGNOSIS — I25.10 CORONARY ARTERY DISEASE INVOLVING NATIVE CORONARY ARTERY OF NATIVE HEART WITHOUT ANGINA PECTORIS: ICD-10-CM

## 2020-11-17 DIAGNOSIS — I10 ESSENTIAL HYPERTENSION: ICD-10-CM

## 2020-11-17 DIAGNOSIS — E78.01 FAMILIAL HYPERCHOLESTEROLEMIA: Primary | ICD-10-CM

## 2020-11-17 PROCEDURE — 99442 PR PHYS/QHP TELEPHONE EVALUATION 11-20 MIN: CPT | Performed by: INTERNAL MEDICINE

## 2020-11-17 NOTE — PROGRESS NOTES
"Kimberly Means  60 y.o. female      1. Familial hypercholesterolemia    2. Essential hypertension    3. Coronary artery disease involving native coronary artery of native heart without angina pectoris        History of Present Illness:  This visit has been rescheduled as a phone visit to comply with patient safety concerns in accordance with CDC recommendations. Total time of discussion was 18 minutes.    You have chosen to receive care through a telephone visit. Do you consent to use a telephone visit for your medical care today? Yes    Kimberly Means is being assessed for above-stated problems.  She denied chest pain, shortness of breath, palpitation, dizziness or syncope.  She has been compliant with the medication.  Blood pressure has apparently been in the normal range and was 128/80 mmHg.     Her PCI to left anterior descending coronary artery was back in 2007.    She had lab work done yesterday and her lipid profiles are in the normal range.  She did report some degree of epistaxis from time to time up to couple of times a week.  She denied rubbing her nose but has been compliant with antiplatelet therapy.    SUBJECTIVE    Allergies   Allergen Reactions   • Kiwi Extract Anaphylaxis   • Lortab [Hydrocodone-Acetaminophen] Confusion and Hallucinations   • Paxil [Paroxetine] Other (See Comments)     \"MESSES WITH MY MIND\"   • Dilaudid [Hydromorphone] Other (See Comments)     Rapid heart rate  Rapid heart rate   • Latex Rash   • Morphine And Related Itching   • Sulfa Antibiotics Hives and Rash         Past Medical History:   Diagnosis Date   • Abdominal pain    • Acute bronchitis    • Acute left otitis media    • Acute otitis externa    • Allergic rhinitis     Due to pollen   • Ankle pain    • Asthma    • Astigmatism    • Bacterial pneumonia 12/02/2015   • Contact dermatitis    • Coronary arteriosclerosis     stent to LAD 2006      • Cough    • Depressive disorder     pt states she has not suffered from depression, " Pt states headache with abd cramping and diarrhea and vomiting for about 24 hours. States bodyaches also. Pt states he had the flu a few months ago and states he was around some sick family members this weekend. takes lexapro for anxiety   • Diarrhea    • Dysuria    • Encounter for Papanicolaou smear of cervix 06/01/2009   • Epigastric pain    • Epistaxis    • Essential hypertension    • Fatigue    • Fever    • Functional diarrhea    • Gastroenteritis    • Generalized anxiety disorder 01/2015    given samples of lexapro.   • Heel pain    • Hematochezia    • Hernia of abdominal wall 05/13/2016    incarcerated- primarily repaired. Now recurrent      • History of bone density study 05/27/2014   • History of colonoscopy     diagnostic (screening) 43759- Normal colonoscopy.;  Last Performed: 12/10/2015   • History of esophagogastroduodenoscopy     epigastric pain, gastritis without bleeding,gastroesophageal reflux without esophagitis;  Last Performed: 01/06/1999   • History of mammogram 06/29/2016   • History of screening mammography    • Hyperglycemia    • Hyperlipidemia    • Impaired glucose tolerance    • Irritable bowel syndrome    • Measles    • Mechanical complication of internal orthopedic device (CMS/HCC)    • Migraine    • Morbid obesity due to excess calories (CMS/HCC)    • Mumps    • Myopia    • Nausea    • Need for DTaP vaccine 01/17/2015   • Retinal tear    • Spasm of back muscles    • Sprain of ankle    • Umbilical hernia    • Upper respiratory infection    • Urinary tract infectious disease    • Ventral hernia     Open ventral hernioplasty. Incarcerated ventral hernia;  Last Performed: 07/17/2012   • Vitamin D deficiency    • Vitreous detachment    • Vitreous opacities          Past Surgical History:   Procedure Laterality Date   • CERVICAL CONIZATION      stress urinary incontinence,endocervical dysplasia;  Last Performed: 09/05/1989   • CHOLECYSTECTOMY  08/26/1993    Laparoscopic   • COLONOSCOPY N/A 2/17/2017    Procedure: COLONOSCOPY;  Surgeon: Young Villegas MD;  Location: [facility] ENDOSCOPY;  Service:    • CORONARY ANGIOPLASTY      stent to LAD;  Last Performed: 2006   • CORONARY ANGIOPLASTY WITH STENT  PLACEMENT     • EPIGASTRIC HERNIA REPAIR  02/02/1973   • GASTRIC SLEEVE LAPAROSCOPIC  11/08/2016   • HYSTERECTOMY  09/05/1989    mmk urethropexy   • TONSILLECTOMY      postoperative tonsillectomy,transfixed bleeding vessel, right tonsillar fossa;  Last Performed: 01/05/1974   • VAGINAL DELIVERY      x 2         Family History   Problem Relation Age of Onset   • Thyroid disease Mother    • Diabetes type II Mother    • Long QT syndrome Mother    • Hypothyroidism Mother    • Breast cancer Mother    • Thyroid disease Father    • Coronary artery disease Father    • Tremor Father    • Diabetes Father    • COPD Father    • Colon cancer Father    • Coronary artery disease Other    • Diabetes Other    • Hypertension Other          Social History     Socioeconomic History   • Marital status:      Spouse name: Milla   • Number of children: 2   • Years of education: Not on file   • Highest education level: Not on file   Occupational History   • Occupation:      Employer: [employer]   Tobacco Use   • Smoking status: Never Smoker   • Smokeless tobacco: Never Used   Substance and Sexual Activity   • Alcohol use: No   • Drug use: No   • Sexual activity: Yes     Partners: Male     Birth control/protection: Post-menopausal, Surgical         Current Outpatient Medications   Medication Sig Dispense Refill   • aspirin 81 MG tablet Take 81 mg by mouth every night.     • butalbital-aspirin-caffeine (FIORINAL) -40 MG capsule Take 1 capsule by mouth every 4 (four) hours as needed for headaches.     • clopidogrel (PLAVIX) 75 MG tablet Take 1 tablet by mouth Daily. 90 tablet 3   • cyanocobalamin 1000 MCG/ML injection Inject 1 mL into the appropriate muscle as directed by prescriber Every 30 (Thirty) Days. 1 mL 6   • escitalopram (LEXAPRO) 10 MG tablet TAKE ONE TABLET BY MOUTH DAILY 90 tablet 1   • ezetimibe-simvastatin (VYTORIN) 10-40 MG per tablet TAKE ONE TABLET BY MOUTH ONCE NIGHTLY 90 tablet 3   •  "fluticasone (FLONASE) 50 MCG/ACT nasal spray 2 sprays into the nostril(s) as directed by provider Daily. Administer 2 sprays in each nostril for each dose. 16 g 5   • Insulin Pen Needle (NOVOFINE PLUS) 32G X 4 MM misc 1 Device Daily. 30 each 5   • Liraglutide -Weight Management 18 MG/3ML solution pen-injector Inject 3 mg under the skin into the appropriate area as directed Daily. 5 pen 3   • metoprolol succinate XL (TOPROL-XL) 25 MG 24 hr tablet TAKE ONE TABLET BY MOUTH DAILY 90 tablet 2   • nystatin (MYCOSTATIN) 446972 UNIT/GM cream      • omeprazole (priLOSEC) 20 MG capsule TAKE ONE CAPSULE BY MOUTH DAILY 90 capsule 1   • vitamin D (ERGOCALCIFEROL) 1.25 MG (47330 UT) capsule capsule Take 1 capsule by mouth 2 (Two) Times a Week. 24 capsule 2     No current facility-administered medications for this visit.          OBJECTIVE    /80 Comment: Pt took vitals  Pulse 68   Ht 154.9 cm (61\")   Wt 76.2 kg (168 lb)   LMP 04/11/1989 (Within Months)   SpO2 98%   BMI 31.74 kg/m²         Review of Systems     Constitutional:  Denies recent weight loss, weight gain, fever or chills, no change in exercise tolerance     HENT:  Denies any hearing loss, epistaxis, hoarseness, or difficulty speaking.     Eyes: Wears eyeglasses or contact lenses     Respiratory:  Denies dyspnea with exertion,no cough, wheezing, or hemoptysis.     Cardiovascular: Negative for palpations, chest pain, orthopnea, PND, peripheral edema, syncope, or claudication.     Gastrointestinal:  Denies change in bowel habits, dyspepsia, ulcer disease, hematochezia, or melena.     Endocrine: Negative for cold intolerance, heat intolerance, polydipsia, polyphagia and polyuria. Denies any history of weight change, heat/cold intolerance, polydipsia, polyuria     Genitourinary: Negative.      Musculoskeletal: Denies any history of arthritic symptoms or back problems     Neurological:  Denies any history of recurrent headaches, strokes, TIA, or seizure disorder. "     Hematological: Denies any food allergies, seasonal allergies, bleeding disorders, or lymphadenopathy.     Psychiatric/Behavioral: history of depression, no substance abuse, or change in cognitive function.        Physical Exam      Constitutional: Cooperative, alert and oriented,  in no acute distress.      HENT:   Head: Normocephalic, normal hair patterns, no masses or tenderness.  Ears, Nose, and Throat: No gross abnormalities. No pallor or cyanosis.   Eyes: EOMS intact, PERRL, conjunctivae and lids unremarkable. Fundoscopic exam and visual fields not performed.   Neck: No palpable masses or adenopathy, no thyromegaly, no JVD, carotid pulses are full and equal bilaterally and without  Bruits.      Cardiovascular: Regular rhythm, S1 and S2 normal, no S3 or S4.  No murmurs, gallops, or rubs detected.      Pulmonary/Chest: Chest: normal symmetry, no tenderness to palpation, normal respiratory excursion, no intercostal retraction, no use of accessory muscles.                                  Pulmonary: Normal breath sounds. No rales or ronchi.     Abdominal: Abdomen soft, bowel sounds normoactive, no masses, no hepatosplenomegaly, non-tender, no bruits.     Musculoskeletal: No deformities, clubbing, cyanosis, erythema, or edema observed. There are no spinal abnormalities noted. Normal muscle strength and tone. Pulses full and equal in all extremities, no bruits auscultated.     Neurological: No gross motor or sensory deficits noted, affect appropriate, oriented to time, person, place.      Skin: Warm and dry to the touch, no apparent skin lesions or masses noted.     Psychiatric: She has a normal mood and affect. Her behavior is normal. Judgment and thought content normal.        Lab Results   Component Value Date    WBC 6.27 11/16/2020    HGB 13.6 11/16/2020    HCT 41.2 11/16/2020    MCV 89.6 11/16/2020     11/16/2020     Lab Results   Component Value Date    GLUCOSE 87 11/16/2020    BUN 10 11/16/2020     CREATININE 0.66 11/16/2020    EGFRIFNONA 91 11/16/2020    BCR 15.2 11/16/2020    CO2 29.0 11/16/2020    CALCIUM 9.2 11/16/2020    ALBUMIN 4.00 11/16/2020    AST 16 11/16/2020    ALT 15 11/16/2020     Lab Results   Component Value Date    CHOL 140 11/16/2020    CHOL 153 10/25/2019    CHOL 218 (H) 04/16/2019     Lab Results   Component Value Date    TRIG 94 11/16/2020    TRIG 97 10/25/2019    TRIG 195 (H) 04/16/2019     Lab Results   Component Value Date    HDL 67 (H) 11/16/2020    HDL 58 10/25/2019    HDL 59 04/16/2019     No components found for: LDLCALC  Lab Results   Component Value Date    LDL 56 11/16/2020    LDL 76 10/25/2019     (H) 04/16/2019     No results found for: HDLLDLRATIO  No components found for: CHOLHDL  Lab Results   Component Value Date    HGBA1C 5.40 09/23/2019     Lab Results   Component Value Date    TSH 1.410 11/16/2020           ASSESSMENT AND PLAN  Kimberly Means is stable with regard to her heart with no clinical evidence of progression of coronary artery disease.  In view of recurrent epistaxis have discontinued aspirin but have advised her to continue Plavix. Antihypertensive therapy with metoprolol succinate and lipid-lowering therapy with Vytorin has been continued.   If the epistaxis does not improve, she would benefit from seeing ENT.    Diagnoses and all orders for this visit:    1. Familial hypercholesterolemia (Primary)    2. Essential hypertension    3. Coronary artery disease involving native coronary artery of native heart without angina pectoris        Patient's Body mass index is 31.74 kg/m². BMI is above normal parameters. Recommendations include: exercise counseling and nutrition counseling.  Patient is a non-smoker    Nallely Foster MD  11/17/2020  08:17 CST

## 2020-11-17 NOTE — PROGRESS NOTES
Per NICOLLE Galaviz, Ms Means has been called with recent lab results & recommendations.  Continue current medications and follow-up as planned or sooner if any problems.    Naomi,  Can she cut back her Vitamin D to once a week?    She is currently taking Vitamin D twice a week.

## 2020-11-17 NOTE — TELEPHONE ENCOUNTER
Per NICOLLE Galaviz, Ms Means has been called with recent lab results & recommendations.  Continue current medications and follow-up as planned or sooner if any problems.        ----- Message from NICOLLE Gomez sent at 11/16/2020  7:34 PM CST -----  Labs normal, please call or send card!

## 2020-12-02 PROCEDURE — 87635 SARS-COV-2 COVID-19 AMP PRB: CPT | Performed by: NURSE PRACTITIONER

## 2020-12-11 DIAGNOSIS — E66.09 CLASS 1 OBESITY DUE TO EXCESS CALORIES WITH SERIOUS COMORBIDITY AND BODY MASS INDEX (BMI) OF 31.0 TO 31.9 IN ADULT: ICD-10-CM

## 2020-12-11 RX ORDER — PEN NEEDLE, DIABETIC 32GX 5/32"
NEEDLE, DISPOSABLE MISCELLANEOUS
Qty: 100 EACH | Refills: 4 | Status: SHIPPED | OUTPATIENT
Start: 2020-12-11 | End: 2022-02-11

## 2020-12-21 ENCOUNTER — IMMUNIZATION (OUTPATIENT)
Dept: VACCINE CLINIC | Facility: HOSPITAL | Age: 60
End: 2020-12-21

## 2020-12-21 PROCEDURE — 0001A: CPT | Performed by: THORACIC SURGERY (CARDIOTHORACIC VASCULAR SURGERY)

## 2020-12-21 PROCEDURE — 91300 HC SARSCOV02 VAC 30MCG/0.3ML IM: CPT | Performed by: THORACIC SURGERY (CARDIOTHORACIC VASCULAR SURGERY)

## 2020-12-23 DIAGNOSIS — I25.10 CORONARY ARTERIOSCLEROSIS: ICD-10-CM

## 2020-12-23 RX ORDER — CLOPIDOGREL BISULFATE 75 MG/1
TABLET ORAL
Qty: 90 TABLET | Refills: 2 | Status: SHIPPED | OUTPATIENT
Start: 2020-12-23 | End: 2021-02-04 | Stop reason: SDUPTHER

## 2021-01-11 ENCOUNTER — IMMUNIZATION (OUTPATIENT)
Dept: VACCINE CLINIC | Facility: HOSPITAL | Age: 61
End: 2021-01-11

## 2021-01-11 PROCEDURE — 91300 HC SARSCOV02 VAC 30MCG/0.3ML IM: CPT | Performed by: THORACIC SURGERY (CARDIOTHORACIC VASCULAR SURGERY)

## 2021-01-11 PROCEDURE — 0001A: CPT | Performed by: THORACIC SURGERY (CARDIOTHORACIC VASCULAR SURGERY)

## 2021-01-11 PROCEDURE — 0002A: CPT | Performed by: THORACIC SURGERY (CARDIOTHORACIC VASCULAR SURGERY)

## 2021-01-26 ENCOUNTER — TELEPHONE (OUTPATIENT)
Dept: FAMILY MEDICINE CLINIC | Facility: CLINIC | Age: 61
End: 2021-01-26

## 2021-02-04 ENCOUNTER — OFFICE VISIT (OUTPATIENT)
Dept: FAMILY MEDICINE CLINIC | Facility: CLINIC | Age: 61
End: 2021-02-04

## 2021-02-04 VITALS
HEIGHT: 61 IN | SYSTOLIC BLOOD PRESSURE: 122 MMHG | DIASTOLIC BLOOD PRESSURE: 82 MMHG | WEIGHT: 175 LBS | BODY MASS INDEX: 33.04 KG/M2

## 2021-02-04 DIAGNOSIS — Z79.899 HIGH RISK MEDICATION USE: ICD-10-CM

## 2021-02-04 DIAGNOSIS — F41.1 GENERALIZED ANXIETY DISORDER: ICD-10-CM

## 2021-02-04 DIAGNOSIS — Z00.00 ANNUAL PHYSICAL EXAM: Primary | ICD-10-CM

## 2021-02-04 DIAGNOSIS — G43.009 MIGRAINE WITHOUT AURA AND WITHOUT STATUS MIGRAINOSUS, NOT INTRACTABLE: ICD-10-CM

## 2021-02-04 DIAGNOSIS — I10 ESSENTIAL HYPERTENSION: ICD-10-CM

## 2021-02-04 DIAGNOSIS — I25.10 CORONARY ARTERIOSCLEROSIS: ICD-10-CM

## 2021-02-04 DIAGNOSIS — E55.9 VITAMIN D DEFICIENCY: ICD-10-CM

## 2021-02-04 DIAGNOSIS — E78.2 MIXED HYPERLIPIDEMIA: ICD-10-CM

## 2021-02-04 DIAGNOSIS — E66.09 CLASS 1 OBESITY DUE TO EXCESS CALORIES WITH SERIOUS COMORBIDITY AND BODY MASS INDEX (BMI) OF 33.0 TO 33.9 IN ADULT: ICD-10-CM

## 2021-02-04 PROCEDURE — 99396 PREV VISIT EST AGE 40-64: CPT | Performed by: NURSE PRACTITIONER

## 2021-02-04 RX ORDER — BUTALBITAL, ASPIRIN, AND CAFFEINE 325; 50; 40 MG/1; MG/1; MG/1
1 CAPSULE ORAL EVERY 4 HOURS PRN
Qty: 60 CAPSULE | Refills: 0 | Status: SHIPPED | OUTPATIENT
Start: 2021-02-04

## 2021-02-04 RX ORDER — ERGOCALCIFEROL 1.25 MG/1
50000 CAPSULE ORAL 2 TIMES WEEKLY
Qty: 24 CAPSULE | Refills: 3 | Status: SHIPPED | OUTPATIENT
Start: 2021-02-04 | End: 2022-02-11

## 2021-02-04 RX ORDER — METOPROLOL SUCCINATE 25 MG/1
25 TABLET, EXTENDED RELEASE ORAL DAILY
Qty: 90 TABLET | Refills: 3 | Status: SHIPPED | OUTPATIENT
Start: 2021-02-04 | End: 2022-06-16 | Stop reason: SDUPTHER

## 2021-02-04 RX ORDER — OMEPRAZOLE 20 MG/1
20 CAPSULE, DELAYED RELEASE ORAL DAILY
Qty: 90 CAPSULE | Refills: 3 | Status: SHIPPED | OUTPATIENT
Start: 2021-02-04 | End: 2021-03-23

## 2021-02-04 RX ORDER — FLUTICASONE PROPIONATE 50 MCG
2 SPRAY, SUSPENSION (ML) NASAL DAILY
Qty: 16 G | Refills: 5 | Status: SHIPPED | OUTPATIENT
Start: 2021-02-04

## 2021-02-04 RX ORDER — ESCITALOPRAM OXALATE 10 MG/1
10 TABLET ORAL DAILY
Qty: 90 TABLET | Refills: 3 | Status: SHIPPED | OUTPATIENT
Start: 2021-02-04 | End: 2022-03-23 | Stop reason: SDUPTHER

## 2021-02-04 RX ORDER — CLOPIDOGREL BISULFATE 75 MG/1
75 TABLET ORAL DAILY
Qty: 90 TABLET | Refills: 3 | Status: SHIPPED | OUTPATIENT
Start: 2021-02-04 | End: 2022-05-05 | Stop reason: SDUPTHER

## 2021-02-04 RX ORDER — EZETIMIBE AND SIMVASTATIN 10; 40 MG/1; MG/1
1 TABLET ORAL NIGHTLY
Qty: 90 TABLET | Refills: 3 | Status: SHIPPED | OUTPATIENT
Start: 2021-02-04 | End: 2021-05-20 | Stop reason: SDUPTHER

## 2021-02-04 NOTE — PROGRESS NOTES
Subjective   Kimberly Means is a 60 y.o. female.  Annual physical exam.  Has high blood pressure, high cholesterol, obesity, anxiety/depression and migraines.  Needs refills on her medications    HPI: Annual physical exam    Hypertension  This is a chronic problem. The current episode started more than 1 year ago. The problem is unchanged. The problem is controlled. Associated symptoms include blurred vision. Pertinent negatives include no shortness of breath. There are no associated agents to hypertension. Risk factors for coronary artery disease include dyslipidemia, family history, post-menopausal state, stress and obesity. Past treatments include beta blockers. Current antihypertension treatment includes beta blockers. The current treatment provides significant improvement. There are no compliance problems.  Hypertensive end-organ damage includes CAD/MI.   Hyperlipidemia  This is a chronic problem. The current episode started more than 1 year ago. The problem is controlled. Recent lipid tests were reviewed and are high. Exacerbating diseases include obesity. Factors aggravating her hyperlipidemia include beta blockers and fatty foods. Pertinent negatives include no shortness of breath. Current antihyperlipidemic treatment includes statins and ezetimibe. The current treatment provides moderate improvement of lipids. There are no compliance problems.  Risk factors for coronary artery disease include dyslipidemia, family history, hypertension, obesity and post-menopausal.   Depression  Visit Type: follow-up  Patient presents with the following symptoms: depressed mood.  Patient is not experiencing: confusion, shortness of breath, suicidal ideas and thoughts of death.  Frequency of symptoms: occasionally   Severity: mild   Sleep quality: good  Nighttime awakenings: occasional  Compliance with medications:  %        Obesity  This is a chronic problem. The current episode started more than 1 year ago. The  problem occurs constantly. The problem has been gradually worsening. Associated symptoms include nausea and vomiting. Pertinent negatives include no chills, coughing, diaphoresis, fatigue or fever. The symptoms are aggravated by drinking and eating. Treatments tried: gastric sleeve and Saxenda  The treatment provided no relief.   Migraine   This is a chronic problem. The current episode started more than 1 year ago. The problem occurs intermittently. The problem has been unchanged. The pain is located in the bilateral region. The pain does not radiate. The pain quality is similar to prior headaches. The quality of the pain is described as throbbing and dull. The pain is at a severity of 8/10. The pain is severe. Associated symptoms include blurred vision, nausea, phonophobia, photophobia and vomiting. Pertinent negatives include no coughing or fever. The symptoms are aggravated by bright light, noise and emotional stress. She has tried oral narcotics for the symptoms. The treatment provided significant relief. Her past medical history is significant for migraine headaches and obesity.        The following portions of the patient's history were reviewed and updated as appropriate:     She  has a past medical history of Abdominal pain, Acute bronchitis, Acute left otitis media, Acute otitis externa, Allergic rhinitis, Ankle pain, Asthma, Astigmatism, Bacterial pneumonia (12/02/2015), Contact dermatitis, Coronary arteriosclerosis, Cough, Depressive disorder, Diarrhea, Dysuria, Encounter for Papanicolaou smear of cervix (06/01/2009), Epigastric pain, Epistaxis, Essential hypertension, Fatigue, Fever, Functional diarrhea, Gastroenteritis, Generalized anxiety disorder (01/2015), Heel pain, Hematochezia, Hernia of abdominal wall (05/13/2016), History of bone density study (05/27/2014), History of colonoscopy, History of esophagogastroduodenoscopy, History of mammogram (06/29/2016), History of screening mammography,  Hyperglycemia, Hyperlipidemia, Impaired glucose tolerance, Irritable bowel syndrome, Measles, Mechanical complication of internal orthopedic device (CMS/HCC), Migraine, Morbid obesity due to excess calories (CMS/[facility]), Mumps, Myopia, Nausea, Need for DTaP vaccine (01/17/2015), Retinal tear, Spasm of back muscles, Sprain of ankle, Umbilical hernia, Upper respiratory infection, Urinary tract infectious disease, Ventral hernia, Vitamin D deficiency, Vitreous detachment, and Vitreous opacities.  She does not have any pertinent problems on file.  She  has a past surgical history that includes Coronary angioplasty; Cholecystectomy (08/26/1993); Cervical Corpectomy; Epigastric Hernia Repair (02/02/1973); Tonsillectomy; Hysterectomy (09/05/1989); Vaginal delivery; Gastric Sleeve (11/08/2016); Colonoscopy (N/A, 2/17/2017); and Coronary angioplasty with stent.  Her family history includes Breast cancer in her mother; COPD in her father; Colon cancer in her father; Coronary artery disease in her father and another family member; Diabetes in her father and another family member; Diabetes type II in her mother; Hypertension in an other family member; Hypothyroidism in her mother; Long QT syndrome in her mother; Thyroid disease in her father and mother; Tremor in her father.  She  reports that she has never smoked. She has never used smokeless tobacco. She reports that she does not drink alcohol or use drugs.  Current Outpatient Medications   Medication Sig Dispense Refill   • BD Pen Needle Delores U/F 32G X 4 MM misc USE ONCE DAILY 100 each 4   • butalbital-aspirin-caffeine (FIORINAL) -40 MG capsule Take 1 capsule by mouth Every 4 (Four) Hours As Needed for Headache. 60 capsule 0   • clopidogrel (PLAVIX) 75 MG tablet Take 1 tablet by mouth Daily. 90 tablet 3   • cyanocobalamin 1000 MCG/ML injection Inject 1 mL into the appropriate muscle as directed by prescriber Every 30 (Thirty) Days. 1 mL 6   • escitalopram (LEXAPRO) 10 MG  tablet Take 1 tablet by mouth Daily. 90 tablet 3   • ezetimibe-simvastatin (VYTORIN) 10-40 MG per tablet Take 1 tablet by mouth Every Night. 90 tablet 3   • fluticasone (FLONASE) 50 MCG/ACT nasal spray 2 sprays into the nostril(s) as directed by provider Daily. Administer 2 sprays in each nostril for each dose. 16 g 5   • Liraglutide -Weight Management 18 MG/3ML solution pen-injector Inject 3 mg under the skin into the appropriate area as directed Daily. 5 pen 5   • metoprolol succinate XL (TOPROL-XL) 25 MG 24 hr tablet Take 1 tablet by mouth Daily. 90 tablet 3   • nystatin (MYCOSTATIN) 677173 UNIT/GM cream      • omeprazole (priLOSEC) 20 MG capsule Take 1 capsule by mouth Daily. 90 capsule 3   • vitamin D (ERGOCALCIFEROL) 1.25 MG (26647 UT) capsule capsule Take 1 capsule by mouth 2 (Two) Times a Week. 24 capsule 3     No current facility-administered medications for this visit.      Current Outpatient Medications on File Prior to Visit   Medication Sig   • BD Pen Needle Delores U/F 32G X 4 MM misc USE ONCE DAILY   • cyanocobalamin 1000 MCG/ML injection Inject 1 mL into the appropriate muscle as directed by prescriber Every 30 (Thirty) Days.   • nystatin (MYCOSTATIN) 908566 UNIT/GM cream      No current facility-administered medications on file prior to visit.      She is allergic to kiwi extract; lortab [hydrocodone-acetaminophen]; paxil [paroxetine]; dilaudid [hydromorphone]; latex; morphine and related; and sulfa antibiotics..    Review of Systems   Constitutional: Negative.  Negative for chills, diaphoresis, fatigue and fever.   HENT: Negative.  Negative for sinus pain.    Eyes: Positive for blurred vision and photophobia.   Respiratory: Negative.  Negative for cough and shortness of breath.    Cardiovascular: Negative.    Gastrointestinal: Positive for nausea and vomiting.   Genitourinary: Negative.    Musculoskeletal: Negative.    Skin: Negative.    Neurological: Negative.    Psychiatric/Behavioral: Negative.   "Negative for confusion and suicidal ideas.       Objective    Visit Vitals  /82   Ht 154.9 cm (61\")   Wt 79.4 kg (175 lb)   LMP 04/11/1989 (Within Months)   BMI 33.07 kg/m²       Physical Exam  Vitals signs and nursing note reviewed.   Constitutional:       Appearance: Normal appearance. She is well-developed. She is obese.   HENT:      Head: Normocephalic.      Right Ear: Tympanic membrane, ear canal and external ear normal.      Left Ear: Tympanic membrane, ear canal and external ear normal.      Nose: Nose normal.      Mouth/Throat:      Mouth: Mucous membranes are moist.      Pharynx: Oropharynx is clear.   Eyes:      Extraocular Movements: Extraocular movements intact.      Conjunctiva/sclera: Conjunctivae normal.      Pupils: Pupils are equal, round, and reactive to light.   Neck:      Musculoskeletal: Normal range of motion and neck supple.   Cardiovascular:      Rate and Rhythm: Normal rate and regular rhythm.      Pulses: Normal pulses.      Heart sounds: Normal heart sounds.   Pulmonary:      Effort: Pulmonary effort is normal.      Breath sounds: Normal breath sounds.   Chest:      Comments: Bilateral, manual breast examine performed and no dimpling, puckering, masses or nodules palpated    Abdominal:      General: Bowel sounds are normal.      Palpations: Abdomen is soft.   Musculoskeletal: Normal range of motion.   Skin:     General: Skin is warm.      Capillary Refill: Capillary refill takes less than 2 seconds.   Neurological:      Mental Status: She is alert and oriented to person, place, and time.   Psychiatric:         Behavior: Behavior normal.         Assessment/Plan   Problems Addressed this Visit        Cardiac and Vasculature    Hyperlipidemia    Relevant Medications    ezetimibe-simvastatin (VYTORIN) 10-40 MG per tablet    Essential hypertension    Relevant Medications    metoprolol succinate XL (TOPROL-XL) 25 MG 24 hr tablet       Endocrine and Metabolic    Vitamin D deficiency    " Relevant Medications    vitamin D (ERGOCALCIFEROL) 1.25 MG (37757 UT) capsule capsule       Mental Health    Generalized anxiety disorder    Relevant Medications    escitalopram (LEXAPRO) 10 MG tablet    Liraglutide -Weight Management 18 MG/3ML solution pen-injector       Neuro    Migraine    Relevant Medications    butalbital-aspirin-caffeine (FIORINAL) -40 MG capsule    escitalopram (LEXAPRO) 10 MG tablet    metoprolol succinate XL (TOPROL-XL) 25 MG 24 hr tablet      Other Visit Diagnoses     Annual physical exam    -  Primary    Class 1 obesity due to excess calories with serious comorbidity and body mass index (BMI) of 33.0 to 33.9 in adult        Relevant Medications    Liraglutide -Weight Management 18 MG/3ML solution pen-injector    Coronary arteriosclerosis        Relevant Medications    clopidogrel (PLAVIX) 75 MG tablet    metoprolol succinate XL (TOPROL-XL) 25 MG 24 hr tablet    High risk medication use        Relevant Orders    Urine Drug Screen - Urine, Clean Catch      Diagnoses       Codes Comments    Annual physical exam    -  Primary ICD-10-CM: Z00.00  ICD-9-CM: V70.0     Essential hypertension     ICD-10-CM: I10  ICD-9-CM: 401.9     Mixed hyperlipidemia     ICD-10-CM: E78.2  ICD-9-CM: 272.2     Class 1 obesity due to excess calories with serious comorbidity and body mass index (BMI) of 33.0 to 33.9 in adult     ICD-10-CM: E66.09, Z68.33  ICD-9-CM: 278.00, V85.33     Generalized anxiety disorder     ICD-10-CM: F41.1  ICD-9-CM: 300.02     Coronary arteriosclerosis     ICD-10-CM: I25.10  ICD-9-CM: 414.00     Migraine without aura and without status migrainosus, not intractable     ICD-10-CM: G43.009  ICD-9-CM: 346.10     Vitamin D deficiency     ICD-10-CM: E55.9  ICD-9-CM: 268.9     High risk medication use     ICD-10-CM: Z79.899  ICD-9-CM: V58.69         New Medications Ordered This Visit   Medications   • butalbital-aspirin-caffeine (FIORINAL) -40 MG capsule     Sig: Take 1 capsule by  mouth Every 4 (Four) Hours As Needed for Headache.     Dispense:  60 capsule     Refill:  0   • clopidogrel (PLAVIX) 75 MG tablet     Sig: Take 1 tablet by mouth Daily.     Dispense:  90 tablet     Refill:  3   • escitalopram (LEXAPRO) 10 MG tablet     Sig: Take 1 tablet by mouth Daily.     Dispense:  90 tablet     Refill:  3   • ezetimibe-simvastatin (VYTORIN) 10-40 MG per tablet     Sig: Take 1 tablet by mouth Every Night.     Dispense:  90 tablet     Refill:  3   • fluticasone (FLONASE) 50 MCG/ACT nasal spray     Si sprays into the nostril(s) as directed by provider Daily. Administer 2 sprays in each nostril for each dose.     Dispense:  16 g     Refill:  5   • Liraglutide -Weight Management 18 MG/3ML solution pen-injector     Sig: Inject 3 mg under the skin into the appropriate area as directed Daily.     Dispense:  5 pen     Refill:  5   • metoprolol succinate XL (TOPROL-XL) 25 MG 24 hr tablet     Sig: Take 1 tablet by mouth Daily.     Dispense:  90 tablet     Refill:  3   • omeprazole (priLOSEC) 20 MG capsule     Sig: Take 1 capsule by mouth Daily.     Dispense:  90 capsule     Refill:  3   • vitamin D (ERGOCALCIFEROL) 1.25 MG (56816 UT) capsule capsule     Sig: Take 1 capsule by mouth 2 (Two) Times a Week.     Dispense:  24 capsule     Refill:  3       1.  Annual physical exam:  Continue on current medications as previously prescribed   Anticipatory guidance and counseling discussed with patient including importance of healthy eating habits and regular exercise regimen  Return in about 1 year (around 2022) for Annual physical.    2.  Essential hypertension:  Continue on Toprol-XL as previously prescribed and refill prescription sent to pharmacy  Continue on reduce sodium diet of no more than 2000 mg/day  Encouraged to avoid fast foods such as pizza and French fries as these usually have higher sodium content in his diet encouraged fresh salads and lean meat    3.  Hyperlipidemia:  Continue on Vytorin  as previously prescribed and refill prescription sent to pharmacy  Reeducated on possible side effects of this medication including but not limited to increased risk for elevated LFTs, myalgias  Encouraged to continue to adhere to a low-fat diet  Educated on importance of adhering to a regular exercise regimen to help reduce cholesterol buildup therefore reducing risk of heart attack or stroke    4.  Class I obesity due to excess calories with serious comorbidity and a body mass index of 33.0-33.9 in adult:  Continue on Saxenda as previously prescribed and refill prescription sent to pharmacy  Reeducated on possible side effects of this medication including not limited to increased risk for medullary thyroid cancer and pancreatitis  Encouraged to continue on low calorie diet of no more than 1500 liliam/day  Discussed increasing exercise regimen such as walking [place] local [place] while still maintaining COVID-19 social distancing    5.  Generalized anxiety disorder:  Continue on Lexapro as previously prescribed and refill prescription sent to pharmacy  Reeducated on possible side effects of this medication including but not limited to increased risk for worsening of depression or thoughts of self-harm  Encouraged to discontinue medication immediately for any new or worsening thoughts of hopelessness, helplessness or self-harm  Discussed importance of getting at least 8 hours of sleep per night  Exercise regimen as discussed above    6.  Coronary arterial sclerosis:  Continue on Plavix as previously prescribed and refill prescription sent to pharmacy  Reeducated on possible side effects of this medication including not limited to increased risk for spontaneous bleeding  Encouraged to contact this office or seek emergency medical treatment for any new or worsening spontaneous bleeding such as epistaxis or bleeding of the gums when brushing her teeth    7.  Migraine without aura and without status migrainous, not  intractable:  Continue on Fiorinal as previously prescribed and refill prescription sent to pharmacy  Educated on possible side effects of this medication including not limited to increased risk for drowsiness, sedation, respiratory depression and potential for abuse and dependency  Strongly educated not to take this medication and operate motor vehicle as this may reduce reaction times  Encouraged to seek solace in a dark, quiet environment at onset of headache to reduce exacerbation  Encouraged to avoid triggers such as loud noises, bright lights and caffeine    8.  Vitamin D deficiency:  Continue on vitamin D as previously prescribed and refill prescription sent to pharmacy  Encourage short walks in the sun to help emulate vitamin D production but encouraged use of sunscreen when outdoors    9.  High risk medication use:  RANDI reviewed by me and will be scanned into medical record  Controlled substance contract signed and dated and will be scanned into medical record  Complete urine drug screen as ordered         This document has been electronically signed by NICOLLE Gomez on February 5, 2021 07:02 CST

## 2021-02-05 ENCOUNTER — LAB (OUTPATIENT)
Dept: LAB | Facility: HOSPITAL | Age: 61
End: 2021-02-05

## 2021-02-05 DIAGNOSIS — Z79.899 HIGH RISK MEDICATION USE: ICD-10-CM

## 2021-02-05 LAB
AMPHET+METHAMPHET UR QL: NEGATIVE
AMPHETAMINES UR QL: NEGATIVE
BARBITURATES UR QL SCN: NEGATIVE
BENZODIAZ UR QL SCN: NEGATIVE
BUPRENORPHINE SERPL-MCNC: NEGATIVE NG/ML
CANNABINOIDS SERPL QL: NEGATIVE
COCAINE UR QL: NEGATIVE
METHADONE UR QL SCN: NEGATIVE
OPIATES UR QL: NEGATIVE
OXYCODONE UR QL SCN: NEGATIVE
PCP UR QL SCN: NEGATIVE
PROPOXYPH UR QL: NEGATIVE
TRICYCLICS UR QL SCN: NEGATIVE

## 2021-02-05 PROCEDURE — 80306 DRUG TEST PRSMV INSTRMNT: CPT

## 2021-03-23 RX ORDER — OMEPRAZOLE 20 MG/1
CAPSULE, DELAYED RELEASE ORAL
Qty: 90 CAPSULE | Refills: 0 | Status: SHIPPED | OUTPATIENT
Start: 2021-03-23 | End: 2022-06-22 | Stop reason: SDUPTHER

## 2021-04-30 ENCOUNTER — HOSPITAL ENCOUNTER (EMERGENCY)
Facility: HOSPITAL | Age: 61
Discharge: HOME | End: 2021-04-30
Attending: EMERGENCY MEDICINE | Admitting: EMERGENCY MEDICINE

## 2021-04-30 VITALS
TEMPERATURE: 97.6 F | SYSTOLIC BLOOD PRESSURE: 107 MMHG | HEIGHT: 61 IN | DIASTOLIC BLOOD PRESSURE: 98 MMHG | BODY MASS INDEX: 32.85 KG/M2 | RESPIRATION RATE: 20 BRPM | WEIGHT: 174 LBS | OXYGEN SATURATION: 95 % | HEART RATE: 62 BPM

## 2021-04-30 DIAGNOSIS — R30.0 DYSURIA: ICD-10-CM

## 2021-04-30 DIAGNOSIS — N39.0 URINARY TRACT INFECTION WITH HEMATURIA, SITE UNSPECIFIED: Primary | ICD-10-CM

## 2021-04-30 DIAGNOSIS — R31.9 URINARY TRACT INFECTION WITH HEMATURIA, SITE UNSPECIFIED: Primary | ICD-10-CM

## 2021-04-30 LAB
ALBUMIN SERPL-MCNC: 3.9 G/DL (ref 3.5–5.2)
ALBUMIN/GLOB SERPL: 1.1 G/DL
ALP SERPL-CCNC: 79 U/L (ref 39–117)
ALT SERPL W P-5'-P-CCNC: 27 U/L (ref 1–33)
ANION GAP SERPL CALCULATED.3IONS-SCNC: 5 MMOL/L (ref 5–15)
AST SERPL-CCNC: 35 U/L (ref 1–32)
BACTERIA UR QL AUTO: ABNORMAL /HPF
BASOPHILS # BLD AUTO: 0.05 10*3/MM3 (ref 0–0.2)
BASOPHILS NFR BLD AUTO: 0.7 % (ref 0–1.5)
BILIRUB SERPL-MCNC: 0.5 MG/DL (ref 0–1.2)
BILIRUB UR QL STRIP: ABNORMAL
BUN SERPL-MCNC: 15 MG/DL (ref 8–23)
BUN/CREAT SERPL: 20.5 (ref 7–25)
CALCIUM SPEC-SCNC: 9.6 MG/DL (ref 8.6–10.5)
CHLORIDE SERPL-SCNC: 106 MMOL/L (ref 98–107)
CLARITY UR: ABNORMAL
CO2 SERPL-SCNC: 29 MMOL/L (ref 22–29)
COLOR UR: ABNORMAL
CREAT SERPL-MCNC: 0.73 MG/DL (ref 0.57–1)
DEPRECATED RDW RBC AUTO: 43.8 FL (ref 37–54)
EOSINOPHIL # BLD AUTO: 0.12 10*3/MM3 (ref 0–0.4)
EOSINOPHIL NFR BLD AUTO: 1.7 % (ref 0.3–6.2)
ERYTHROCYTE [DISTWIDTH] IN BLOOD BY AUTOMATED COUNT: 13.2 % (ref 12.3–15.4)
GFR SERPL CREATININE-BSD FRML MDRD: 81 ML/MIN/1.73
GLOBULIN UR ELPH-MCNC: 3.5 GM/DL
GLUCOSE SERPL-MCNC: 91 MG/DL (ref 65–99)
GLUCOSE UR STRIP-MCNC: NEGATIVE MG/DL
HCT VFR BLD AUTO: 45.1 % (ref 34–46.6)
HGB BLD-MCNC: 14.6 G/DL (ref 12–15.9)
HGB UR QL STRIP.AUTO: NEGATIVE
HYALINE CASTS UR QL AUTO: ABNORMAL /LPF
IMM GRANULOCYTES # BLD AUTO: 0.01 10*3/MM3 (ref 0–0.05)
IMM GRANULOCYTES NFR BLD AUTO: 0.1 % (ref 0–0.5)
KETONES UR QL STRIP: ABNORMAL
LEUKOCYTE ESTERASE UR QL STRIP.AUTO: ABNORMAL
LIPASE SERPL-CCNC: 36 U/L (ref 13–60)
LYMPHOCYTES # BLD AUTO: 2.76 10*3/MM3 (ref 0.7–3.1)
LYMPHOCYTES NFR BLD AUTO: 38.9 % (ref 19.6–45.3)
MCH RBC QN AUTO: 29.3 PG (ref 26.6–33)
MCHC RBC AUTO-ENTMCNC: 32.4 G/DL (ref 31.5–35.7)
MCV RBC AUTO: 90.4 FL (ref 79–97)
MONOCYTES # BLD AUTO: 0.48 10*3/MM3 (ref 0.1–0.9)
MONOCYTES NFR BLD AUTO: 6.8 % (ref 5–12)
NEUTROPHILS NFR BLD AUTO: 3.67 10*3/MM3 (ref 1.7–7)
NEUTROPHILS NFR BLD AUTO: 51.8 % (ref 42.7–76)
NITRITE UR QL STRIP: POSITIVE
NRBC BLD AUTO-RTO: 0 /100 WBC (ref 0–0.2)
PH UR STRIP.AUTO: <=5 [PH] (ref 5–9)
PLATELET # BLD AUTO: 255 10*3/MM3 (ref 140–450)
PMV BLD AUTO: 9 FL (ref 6–12)
POTASSIUM SERPL-SCNC: 4.2 MMOL/L (ref 3.5–5.2)
PROT SERPL-MCNC: 7.4 G/DL (ref 6–8.5)
PROT UR QL STRIP: NEGATIVE
RBC # BLD AUTO: 4.99 10*6/MM3 (ref 3.77–5.28)
RBC # UR: ABNORMAL /HPF
REF LAB TEST METHOD: ABNORMAL
SODIUM SERPL-SCNC: 140 MMOL/L (ref 136–145)
SP GR UR STRIP: 1.02 (ref 1–1.03)
SQUAMOUS #/AREA URNS HPF: ABNORMAL /HPF
UROBILINOGEN UR QL STRIP: ABNORMAL
WBC # BLD AUTO: 7.09 10*3/MM3 (ref 3.4–10.8)
WBC UR QL AUTO: ABNORMAL /HPF

## 2021-04-30 PROCEDURE — 96375 TX/PRO/DX INJ NEW DRUG ADDON: CPT

## 2021-04-30 PROCEDURE — 80053 COMPREHEN METABOLIC PANEL: CPT | Performed by: EMERGENCY MEDICINE

## 2021-04-30 PROCEDURE — 25010000002 KETOROLAC TROMETHAMINE PER 15 MG: Performed by: EMERGENCY MEDICINE

## 2021-04-30 PROCEDURE — 96365 THER/PROPH/DIAG IV INF INIT: CPT

## 2021-04-30 PROCEDURE — 81001 URINALYSIS AUTO W/SCOPE: CPT | Performed by: EMERGENCY MEDICINE

## 2021-04-30 PROCEDURE — 99283 EMERGENCY DEPT VISIT LOW MDM: CPT

## 2021-04-30 PROCEDURE — 83690 ASSAY OF LIPASE: CPT | Performed by: EMERGENCY MEDICINE

## 2021-04-30 PROCEDURE — 25010000002 LEVOFLOXACIN PER 250 MG: Performed by: EMERGENCY MEDICINE

## 2021-04-30 PROCEDURE — 85025 COMPLETE CBC W/AUTO DIFF WBC: CPT | Performed by: EMERGENCY MEDICINE

## 2021-04-30 RX ORDER — PHENAZOPYRIDINE HYDROCHLORIDE 100 MG/1
100 TABLET, FILM COATED ORAL 3 TIMES DAILY PRN
Qty: 24 TABLET | Refills: 0 | Status: SHIPPED | OUTPATIENT
Start: 2021-04-30 | End: 2021-07-14

## 2021-04-30 RX ORDER — LEVOFLOXACIN 500 MG/1
500 TABLET, FILM COATED ORAL DAILY
Qty: 7 TABLET | Refills: 0 | Status: SHIPPED | OUTPATIENT
Start: 2021-04-30 | End: 2021-12-02

## 2021-04-30 RX ORDER — PHENAZOPYRIDINE HYDROCHLORIDE 200 MG/1
200 TABLET, FILM COATED ORAL ONCE
Status: COMPLETED | OUTPATIENT
Start: 2021-04-30 | End: 2021-04-30

## 2021-04-30 RX ORDER — KETOROLAC TROMETHAMINE 30 MG/ML
30 INJECTION, SOLUTION INTRAMUSCULAR; INTRAVENOUS EVERY 6 HOURS PRN
Status: DISCONTINUED | OUTPATIENT
Start: 2021-04-30 | End: 2021-04-30 | Stop reason: HOSPADM

## 2021-04-30 RX ORDER — LEVOFLOXACIN 5 MG/ML
500 INJECTION, SOLUTION INTRAVENOUS ONCE
Status: COMPLETED | OUTPATIENT
Start: 2021-04-30 | End: 2021-04-30

## 2021-04-30 RX ORDER — SODIUM CHLORIDE 0.9 % (FLUSH) 0.9 %
10 SYRINGE (ML) INJECTION AS NEEDED
Status: DISCONTINUED | OUTPATIENT
Start: 2021-04-30 | End: 2021-04-30 | Stop reason: HOSPADM

## 2021-04-30 RX ADMIN — PHENAZOPYRIDINE 200 MG: 200 TABLET ORAL at 09:41

## 2021-04-30 RX ADMIN — SODIUM CHLORIDE 1000 ML: 900 INJECTION, SOLUTION INTRAVENOUS at 08:47

## 2021-04-30 RX ADMIN — KETOROLAC TROMETHAMINE 30 MG: 30 INJECTION, SOLUTION INTRAMUSCULAR; INTRAVENOUS at 08:47

## 2021-04-30 RX ADMIN — LEVOFLOXACIN 500 MG: 5 INJECTION, SOLUTION INTRAVENOUS at 09:44

## 2021-05-20 ENCOUNTER — OFFICE VISIT (OUTPATIENT)
Dept: CARDIOLOGY | Facility: CLINIC | Age: 61
End: 2021-05-20

## 2021-05-20 VITALS
SYSTOLIC BLOOD PRESSURE: 132 MMHG | OXYGEN SATURATION: 96 % | HEIGHT: 61 IN | WEIGHT: 175 LBS | HEART RATE: 85 BPM | DIASTOLIC BLOOD PRESSURE: 76 MMHG | TEMPERATURE: 97.8 F | BODY MASS INDEX: 33.04 KG/M2

## 2021-05-20 DIAGNOSIS — I10 ESSENTIAL HYPERTENSION: ICD-10-CM

## 2021-05-20 DIAGNOSIS — I25.10 CORONARY ARTERY DISEASE INVOLVING NATIVE CORONARY ARTERY OF NATIVE HEART WITHOUT ANGINA PECTORIS: ICD-10-CM

## 2021-05-20 DIAGNOSIS — E78.2 MIXED HYPERLIPIDEMIA: ICD-10-CM

## 2021-05-20 DIAGNOSIS — E78.01 FAMILIAL HYPERCHOLESTEROLEMIA: ICD-10-CM

## 2021-05-20 DIAGNOSIS — R00.2 PALPITATIONS: Primary | ICD-10-CM

## 2021-05-20 PROCEDURE — 99213 OFFICE O/P EST LOW 20 MIN: CPT | Performed by: INTERNAL MEDICINE

## 2021-05-20 PROCEDURE — 93000 ELECTROCARDIOGRAM COMPLETE: CPT | Performed by: INTERNAL MEDICINE

## 2021-05-20 RX ORDER — EZETIMIBE AND SIMVASTATIN 10; 40 MG/1; MG/1
1 TABLET ORAL NIGHTLY
Qty: 90 TABLET | Refills: 3 | Status: SHIPPED | OUTPATIENT
Start: 2021-05-20 | End: 2022-03-22 | Stop reason: SDUPTHER

## 2021-05-20 RX ORDER — CEFDINIR 300 MG/1
CAPSULE ORAL
COMMUNITY
Start: 2021-04-27 | End: 2021-05-20

## 2021-05-20 NOTE — PROGRESS NOTES
"Kimberly Means  60 y.o. female      1. Palpitations    2. Coronary artery disease involving native coronary artery of native heart without angina pectoris    3. Essential hypertension    4. Familial hypercholesterolemia    5. Mixed hyperlipidemia        History of Present Illness:  Kimberly Means is above for coronary artery disease, hypertension hyperlipidemia.  She has done very well from a cardiac standpoint and denied any chest pain, shortness of breath, palpitation, dizziness or syncope.  She had Covid infection back in November 2020 and though she was symptomatic at that time, she is recovered from it.    Her PCI to left anterior descending coronary artery was back in 2007.    EKG today showed sinus rhythm with heart rate of 85 bpm with no ST-T wave changes diagnostic of ischemia.  Minimal nonspecific ST-T changes.    SUBJECTIVE    Allergies   Allergen Reactions   • Kiwi Extract Anaphylaxis   • Lortab [Hydrocodone-Acetaminophen] Confusion and Hallucinations   • Paxil [Paroxetine] Mental Status Change     \"MESSES WITH MY MIND\"   • Dilaudid [Hydromorphone] Unknown - High Severity     Rapid heart rate     • Morphine And Related Itching   • Sulfa Antibiotics Hives and Rash         Past Medical History:   Diagnosis Date   • Abdominal pain    • Acute bronchitis    • Acute left otitis media    • Acute otitis externa    • Allergic rhinitis     Due to pollen   • Ankle pain    • Asthma    • Astigmatism    • Bacterial pneumonia 12/02/2015   • Contact dermatitis    • Coronary arteriosclerosis     stent to LAD 2006      • Cough    • Depressive disorder     pt states she has not suffered from depression, takes lexapro for anxiety   • Diarrhea    • Dysuria    • Encounter for Papanicolaou smear of cervix 06/01/2009   • Epigastric pain    • Epistaxis    • Essential hypertension    • Fatigue    • Fever    • Functional diarrhea    • Gastroenteritis    • Generalized anxiety disorder 01/2015    given samples of lexapro.   • Heel " pain    • Hematochezia    • Hernia of abdominal wall 05/13/2016    incarcerated- primarily repaired. Now recurrent      • History of bone density study 05/27/2014   • History of colonoscopy     diagnostic (screening) 01429- Normal colonoscopy.;  Last Performed: 12/10/2015   • History of esophagogastroduodenoscopy     epigastric pain, gastritis without bleeding,gastroesophageal reflux without esophagitis;  Last Performed: 01/06/1999   • History of mammogram 06/29/2016   • History of screening mammography    • Hyperglycemia    • Hyperlipidemia    • Impaired glucose tolerance    • Irritable bowel syndrome    • Measles    • Mechanical complication of internal orthopedic device (CMS/[facility])    • Migraine    • Morbid obesity due to excess calories (CMS/[facility])    • Mumps    • Myopia    • Nausea    • Need for DTaP vaccine 01/17/2015   • Retinal tear    • Spasm of back muscles    • Sprain of ankle    • Umbilical hernia    • Upper respiratory infection    • Urinary tract infectious disease    • Ventral hernia     Open ventral hernioplasty. Incarcerated ventral hernia;  Last Performed: 07/17/2012   • Vitamin D deficiency    • Vitreous detachment    • Vitreous opacities          Past Surgical History:   Procedure Laterality Date   • CERVICAL CONIZATION      stress urinary incontinence,endocervical dysplasia;  Last Performed: 09/05/1989   • CHOLECYSTECTOMY  08/26/1993    Laparoscopic   • COLONOSCOPY N/A 2/17/2017    Procedure: COLONOSCOPY;  Surgeon: Young Villegas MD;  Location: [facility] ENDOSCOPY;  Service:    • CORONARY ANGIOPLASTY      stent to LAD;  Last Performed: 2006   • CORONARY ANGIOPLASTY WITH STENT PLACEMENT     • EPIGASTRIC HERNIA REPAIR  02/02/1973   • GASTRIC SLEEVE LAPAROSCOPIC  11/08/2016   • HYSTERECTOMY  09/05/1989    mmk urethropexy   • TONSILLECTOMY      postoperative tonsillectomy,transfixed bleeding vessel, right tonsillar fossa;  Last Performed: 01/05/1974   • VAGINAL DELIVERY      x 2         Family History    Problem Relation Age of Onset   • Thyroid disease Mother    • Diabetes type II Mother    • Long QT syndrome Mother    • Hypothyroidism Mother    • Breast cancer Mother    • Thyroid disease Father    • Coronary artery disease Father    • Tremor Father    • Diabetes Father    • COPD Father    • Colon cancer Father    • Coronary artery disease Other    • Diabetes Other    • Hypertension Other          Social History     Socioeconomic History   • Marital status:      Spouse name: Milla   • Number of children: 2   • Years of education: Not on file   • Highest education level: Not on file   Tobacco Use   • Smoking status: Never Smoker   • Smokeless tobacco: Never Used   Substance and Sexual Activity   • Alcohol use: No   • Drug use: No   • Sexual activity: Yes     Partners: Male     Birth control/protection: Post-menopausal, Surgical         Current Outpatient Medications   Medication Sig Dispense Refill   • BD Pen Needle Delores U/F 32G X 4 MM misc USE ONCE DAILY 100 each 4   • butalbital-aspirin-caffeine (FIORINAL) -40 MG capsule Take 1 capsule by mouth Every 4 (Four) Hours As Needed for Headache. 60 capsule 0   • clopidogrel (PLAVIX) 75 MG tablet Take 1 tablet by mouth Daily. 90 tablet 3   • cyanocobalamin 1000 MCG/ML injection Inject 1 mL into the appropriate muscle as directed by prescriber Every 30 (Thirty) Days. 1 mL 6   • escitalopram (LEXAPRO) 10 MG tablet Take 1 tablet by mouth Daily. 90 tablet 3   • ezetimibe-simvastatin (VYTORIN) 10-40 MG per tablet Take 1 tablet by mouth Every Night. 90 tablet 3   • fluticasone (FLONASE) 50 MCG/ACT nasal spray 2 sprays into the nostril(s) as directed by provider Daily. Administer 2 sprays in each nostril for each dose. 16 g 5   • levoFLOXacin (LEVAQUIN) 500 MG tablet Take 1 tablet by mouth Daily. 7 tablet 0   • Liraglutide -Weight Management 18 MG/3ML solution pen-injector Inject 3 mg under the skin into the appropriate area as directed Daily. 5 pen 5   •  "metoprolol succinate XL (TOPROL-XL) 25 MG 24 hr tablet Take 1 tablet by mouth Daily. 90 tablet 3   • nystatin (MYCOSTATIN) 348894 UNIT/GM cream      • omeprazole (priLOSEC) 20 MG capsule TAKE ONE CAPSULE BY MOUTH DAILY 90 capsule 0   • phenazopyridine (PYRIDIUM) 100 MG tablet Take 1 tablet by mouth 3 (Three) Times a Day As Needed for Bladder Spasms. 24 tablet 0   • vitamin D (ERGOCALCIFEROL) 1.25 MG (09075 UT) capsule capsule Take 1 capsule by mouth 2 (Two) Times a Week. 24 capsule 3     No current facility-administered medications for this visit.         OBJECTIVE    /76 (BP Location: Left arm, Patient Position: Sitting, Cuff Size: Adult)   Pulse 85   Temp 97.8 °F (36.6 °C)   Ht 154.9 cm (61\")   Wt 79.4 kg (175 lb)   LMP 04/11/1989 (Within Months)   SpO2 96%   BMI 33.07 kg/m²         Review of Systems : The following systems were reviewed and no changes noted    Constitutional:  Denies recent weight loss, weight gain, fever or chills, no change in exercise tolerance     HENT:  Denies any hearing loss, epistaxis, hoarseness, or difficulty speaking.     Eyes: Wears eyeglasses or contact lenses     Respiratory:  Denies dyspnea with exertion,no cough, wheezing, or hemoptysis.     Cardiovascular: Negative for palpations, chest pain, orthopnea, PND, peripheral edema, syncope, or claudication.     Gastrointestinal:  Denies change in bowel habits, dyspepsia, ulcer disease, hematochezia, or melena.     Endocrine: Negative for cold intolerance, heat intolerance, polydipsia, polyphagia and polyuria.    Genitourinary: Negative.      Musculoskeletal: Denies any history of arthritic symptoms or back problems     Neurological:  Denies any history of recurrent headaches, strokes, TIA, or seizure disorder.     Hematological: Denies any food allergies, seasonal allergies, bleeding disorders, or lymphadenopathy.     Psychiatric/Behavioral: history of depression, no substance abuse, or change in cognitive function. "        Physical Exam : The following systems were reassessed and no changes noted     Constitutional: Cooperative, alert and oriented,  in no acute distress.      HENT:   Head: Normocephalic, normal hair patterns, no masses or tenderness.  Ears, Nose, and Throat: No gross abnormalities. No pallor or cyanosis.   Eyes: EOMS intact, PERRL, conjunctivae and lids unremarkable. Fundoscopic exam and visual fields not performed.   Neck: No palpable masses or adenopathy, no thyromegaly, no JVD, carotid pulses are full and equal bilaterally and without  Bruits.      Cardiovascular: Regular rhythm, S1 and S2 normal, no S3 or S4.  No murmurs, gallops, or rubs detected.      Pulmonary/Chest: Chest: normal symmetry, no tenderness to palpation, normal respiratory excursion, no intercostal retraction, no use of accessory muscles.                                  Pulmonary: Normal breath sounds. No rales or ronchi.     Abdominal: Abdomen soft, bowel sounds normoactive, no masses, no hepatosplenomegaly, non-tender, no bruits.     Musculoskeletal: No deformities, clubbing, cyanosis, erythema, or edema observed.     Neurological: No gross motor or sensory deficits noted, affect appropriate, oriented to time, person, place.      Skin: Warm and dry to the touch, no apparent skin lesions or masses noted.     Psychiatric: She has a normal mood and affect. Her behavior is normal. Judgment and thought content normal.        Lab Results   Component Value Date    WBC 7.09 04/30/2021    HGB 14.6 04/30/2021    HCT 45.1 04/30/2021    MCV 90.4 04/30/2021     04/30/2021     Lab Results   Component Value Date    GLUCOSE 91 04/30/2021    BUN 15 04/30/2021    CREATININE 0.73 04/30/2021    EGFRIFNONA 81 04/30/2021    BCR 20.5 04/30/2021    CO2 29.0 04/30/2021    CALCIUM 9.6 04/30/2021    ALBUMIN 3.90 04/30/2021    AST 35 (H) 04/30/2021    ALT 27 04/30/2021     Lab Results   Component Value Date    CHOL 140 11/16/2020    CHOL 153 10/25/2019     CHOL 218 (H) 04/16/2019     Lab Results   Component Value Date    TRIG 94 11/16/2020    TRIG 97 10/25/2019    TRIG 195 (H) 04/16/2019     Lab Results   Component Value Date    HDL 67 (H) 11/16/2020    HDL 58 10/25/2019    HDL 59 04/16/2019     No components found for: LDLCALC  Lab Results   Component Value Date    LDL 56 11/16/2020    LDL 76 10/25/2019     (H) 04/16/2019     No results found for: HDLLDLRATIO  No components found for: CHOLHDL  Lab Results   Component Value Date    HGBA1C 5.40 09/23/2019     Lab Results   Component Value Date    TSH 1.410 11/16/2020           ASSESSMENT AND PLAN  Kimberly Means is stable with regard to her heart with no clinical evidence of progression of coronary artery disease.  Antiplatelet therapy with Plavix, Antihypertensive therapy with metoprolol succinate and lipid-lowering therapy with Vytorin has been continued.  Vytorin was refilled.    Diagnoses and all orders for this visit:    1. Palpitations (Primary)  -     ECG 12 Lead    2. Coronary artery disease involving native coronary artery of native heart without angina pectoris    3. Essential hypertension    4. Familial hypercholesterolemia    5. Mixed hyperlipidemia  -     ezetimibe-simvastatin (VYTORIN) 10-40 MG per tablet; Take 1 tablet by mouth Every Night.  Dispense: 90 tablet; Refill: 3        Patient's Body mass index is 33.07 kg/m². BMI is above normal parameters. Recommendations include: exercise counseling and nutrition counseling.  Patient is a non-smoker    Nallely Foster MD  5/20/2021  18:33 CDT

## 2021-05-23 LAB
QT INTERVAL: 376 MS
QTC INTERVAL: 447 MS

## 2021-09-12 PROCEDURE — 87635 SARS-COV-2 COVID-19 AMP PRB: CPT | Performed by: NURSE PRACTITIONER

## 2021-09-26 ENCOUNTER — HOSPITAL ENCOUNTER (EMERGENCY)
Facility: HOSPITAL | Age: 61
Discharge: HOME | End: 2021-09-26
Attending: EMERGENCY MEDICINE | Admitting: EMERGENCY MEDICINE

## 2021-09-26 ENCOUNTER — APPOINTMENT (OUTPATIENT)
Dept: CT IMAGING | Facility: HOSPITAL | Age: 61
End: 2021-09-26

## 2021-09-26 VITALS
WEIGHT: 173 LBS | OXYGEN SATURATION: 97 % | SYSTOLIC BLOOD PRESSURE: 117 MMHG | BODY MASS INDEX: 32.66 KG/M2 | HEIGHT: 61 IN | RESPIRATION RATE: 18 BRPM | DIASTOLIC BLOOD PRESSURE: 69 MMHG | HEART RATE: 71 BPM | TEMPERATURE: 97.6 F

## 2021-09-26 DIAGNOSIS — R10.31 RLQ ABDOMINAL PAIN: Primary | ICD-10-CM

## 2021-09-26 LAB
ALBUMIN SERPL-MCNC: 4 G/DL (ref 3.5–5.2)
ALBUMIN/GLOB SERPL: 1.3 G/DL
ALP SERPL-CCNC: 80 U/L (ref 39–117)
ALT SERPL W P-5'-P-CCNC: 13 U/L (ref 1–33)
ANION GAP SERPL CALCULATED.3IONS-SCNC: 8 MMOL/L (ref 5–15)
AST SERPL-CCNC: 17 U/L (ref 1–32)
BASOPHILS # BLD AUTO: 0.04 10*3/MM3 (ref 0–0.2)
BASOPHILS NFR BLD AUTO: 0.6 % (ref 0–1.5)
BILIRUB SERPL-MCNC: 0.3 MG/DL (ref 0–1.2)
BILIRUB UR QL STRIP: NEGATIVE
BUN SERPL-MCNC: 14 MG/DL (ref 8–23)
BUN/CREAT SERPL: 14.1 (ref 7–25)
CALCIUM SPEC-SCNC: 8.8 MG/DL (ref 8.6–10.5)
CHLORIDE SERPL-SCNC: 107 MMOL/L (ref 98–107)
CLARITY UR: CLEAR
CO2 SERPL-SCNC: 27 MMOL/L (ref 22–29)
COLOR UR: YELLOW
CREAT SERPL-MCNC: 0.99 MG/DL (ref 0.57–1)
DEPRECATED RDW RBC AUTO: 44.5 FL (ref 37–54)
EOSINOPHIL # BLD AUTO: 0.11 10*3/MM3 (ref 0–0.4)
EOSINOPHIL NFR BLD AUTO: 1.8 % (ref 0.3–6.2)
ERYTHROCYTE [DISTWIDTH] IN BLOOD BY AUTOMATED COUNT: 13.4 % (ref 12.3–15.4)
GFR SERPL CREATININE-BSD FRML MDRD: 57 ML/MIN/1.73
GLOBULIN UR ELPH-MCNC: 3.1 GM/DL
GLUCOSE SERPL-MCNC: 105 MG/DL (ref 65–99)
GLUCOSE UR STRIP-MCNC: NEGATIVE MG/DL
HCT VFR BLD AUTO: 43 % (ref 34–46.6)
HGB BLD-MCNC: 13.8 G/DL (ref 12–15.9)
HGB UR QL STRIP.AUTO: NEGATIVE
HOLD SPECIMEN: NORMAL
HOLD SPECIMEN: NORMAL
IMM GRANULOCYTES # BLD AUTO: 0.02 10*3/MM3 (ref 0–0.05)
IMM GRANULOCYTES NFR BLD AUTO: 0.3 % (ref 0–0.5)
KETONES UR QL STRIP: ABNORMAL
LEUKOCYTE ESTERASE UR QL STRIP.AUTO: NEGATIVE
LIPASE SERPL-CCNC: 30 U/L (ref 13–60)
LYMPHOCYTES # BLD AUTO: 2.06 10*3/MM3 (ref 0.7–3.1)
LYMPHOCYTES NFR BLD AUTO: 32.8 % (ref 19.6–45.3)
MCH RBC QN AUTO: 29.1 PG (ref 26.6–33)
MCHC RBC AUTO-ENTMCNC: 32.1 G/DL (ref 31.5–35.7)
MCV RBC AUTO: 90.7 FL (ref 79–97)
MONOCYTES # BLD AUTO: 0.38 10*3/MM3 (ref 0.1–0.9)
MONOCYTES NFR BLD AUTO: 6.1 % (ref 5–12)
NEUTROPHILS NFR BLD AUTO: 3.67 10*3/MM3 (ref 1.7–7)
NEUTROPHILS NFR BLD AUTO: 58.4 % (ref 42.7–76)
NITRITE UR QL STRIP: NEGATIVE
NRBC BLD AUTO-RTO: 0 /100 WBC (ref 0–0.2)
PH UR STRIP.AUTO: 5.5 [PH] (ref 5–9)
PLATELET # BLD AUTO: 226 10*3/MM3 (ref 140–450)
PMV BLD AUTO: 9.5 FL (ref 6–12)
POTASSIUM SERPL-SCNC: 3.8 MMOL/L (ref 3.5–5.2)
PROT SERPL-MCNC: 7.1 G/DL (ref 6–8.5)
PROT UR QL STRIP: NEGATIVE
RBC # BLD AUTO: 4.74 10*6/MM3 (ref 3.77–5.28)
SODIUM SERPL-SCNC: 142 MMOL/L (ref 136–145)
SP GR UR STRIP: 1.03 (ref 1–1.03)
UROBILINOGEN UR QL STRIP: ABNORMAL
WBC # BLD AUTO: 6.28 10*3/MM3 (ref 3.4–10.8)
WHOLE BLOOD HOLD SPECIMEN: NORMAL

## 2021-09-26 PROCEDURE — 80053 COMPREHEN METABOLIC PANEL: CPT | Performed by: EMERGENCY MEDICINE

## 2021-09-26 PROCEDURE — 25010000002 KETOROLAC TROMETHAMINE PER 15 MG: Performed by: EMERGENCY MEDICINE

## 2021-09-26 PROCEDURE — 96374 THER/PROPH/DIAG INJ IV PUSH: CPT

## 2021-09-26 PROCEDURE — 99283 EMERGENCY DEPT VISIT LOW MDM: CPT

## 2021-09-26 PROCEDURE — 85025 COMPLETE CBC W/AUTO DIFF WBC: CPT | Performed by: EMERGENCY MEDICINE

## 2021-09-26 PROCEDURE — 74176 CT ABD & PELVIS W/O CONTRAST: CPT

## 2021-09-26 PROCEDURE — 83690 ASSAY OF LIPASE: CPT | Performed by: EMERGENCY MEDICINE

## 2021-09-26 PROCEDURE — 81003 URINALYSIS AUTO W/O SCOPE: CPT | Performed by: EMERGENCY MEDICINE

## 2021-09-26 RX ORDER — KETOROLAC TROMETHAMINE 30 MG/ML
30 INJECTION, SOLUTION INTRAMUSCULAR; INTRAVENOUS EVERY 6 HOURS PRN
Status: DISCONTINUED | OUTPATIENT
Start: 2021-09-26 | End: 2021-09-26 | Stop reason: HOSPADM

## 2021-09-26 RX ORDER — TRAMADOL HYDROCHLORIDE 50 MG/1
50 TABLET ORAL EVERY 6 HOURS PRN
Status: DISCONTINUED | OUTPATIENT
Start: 2021-09-26 | End: 2021-09-26 | Stop reason: HOSPADM

## 2021-09-26 RX ORDER — SODIUM CHLORIDE 0.9 % (FLUSH) 0.9 %
10 SYRINGE (ML) INJECTION AS NEEDED
Status: DISCONTINUED | OUTPATIENT
Start: 2021-09-26 | End: 2021-09-26 | Stop reason: HOSPADM

## 2021-09-26 RX ORDER — NAPROXEN 250 MG/1
250 TABLET ORAL 2 TIMES DAILY PRN
Qty: 20 TABLET | Refills: 0 | Status: SHIPPED | OUTPATIENT
Start: 2021-09-26 | End: 2022-10-27

## 2021-09-26 RX ORDER — TRAMADOL HYDROCHLORIDE 50 MG/1
50 TABLET ORAL EVERY 8 HOURS PRN
Qty: 12 TABLET | Refills: 0 | Status: SHIPPED | OUTPATIENT
Start: 2021-09-26 | End: 2021-12-02

## 2021-09-26 RX ADMIN — TRAMADOL HYDROCHLORIDE 50 MG: 50 TABLET, FILM COATED ORAL at 16:40

## 2021-09-26 RX ADMIN — KETOROLAC TROMETHAMINE 30 MG: 30 INJECTION, SOLUTION INTRAMUSCULAR; INTRAVENOUS at 15:25

## 2021-09-26 NOTE — ED PROVIDER NOTES
Subjective   Patient presents emergency department complaint of right-sided flank pain.  Patient notes the pain throughout the day today.  Patient notes radiation from her back towards her lower abdomen.  Patient has nausea from the pain itself without fevers.  Patient has had no vomiting with this.  Patient has not had experience with prior similar pain.  Patient has no history of kidney stones.  No chest pain or shortness of breath with significant cardiac history with stent placement.  No bowel changes.  Really no bladder changes save for darkening of the patient's urine.          Review of Systems   Constitutional: Negative.  Negative for appetite change, chills and fever.   HENT: Negative.  Negative for congestion.    Eyes: Negative.  Negative for photophobia and visual disturbance.   Respiratory: Negative.  Negative for cough, chest tightness and shortness of breath.    Cardiovascular: Negative.  Negative for chest pain and palpitations.   Gastrointestinal: Negative.  Negative for abdominal pain, constipation, diarrhea, nausea and vomiting.   Endocrine: Negative.    Genitourinary: Positive for flank pain. Negative for decreased urine volume, dysuria and hematuria.   Musculoskeletal: Negative for arthralgias, back pain, myalgias, neck pain and neck stiffness.   Skin: Negative.  Negative for pallor.   Neurological: Negative.  Negative for dizziness, syncope, weakness, light-headedness, numbness and headaches.   Psychiatric/Behavioral: Negative.  Negative for confusion and suicidal ideas. The patient is not nervous/anxious.    All other systems reviewed and are negative.      Past Medical History:   Diagnosis Date   • Abdominal pain    • Acute bronchitis    • Acute left otitis media    • Acute otitis externa    • Allergic rhinitis     Due to pollen   • Ankle pain    • Asthma    • Astigmatism    • Bacterial pneumonia 12/02/2015   • Contact dermatitis    • Coronary arteriosclerosis     stent to LAD 2006      •  "Cough    • Depressive disorder     pt states she has not suffered from depression, takes lexapro for anxiety   • Diarrhea    • Dysuria    • Encounter for Papanicolaou smear of cervix 06/01/2009   • Epigastric pain    • Epistaxis    • Essential hypertension    • Fatigue    • Fever    • Functional diarrhea    • Gastroenteritis    • Generalized anxiety disorder 01/2015    given samples of lexapro.   • Heel pain    • Hematochezia    • Hernia of abdominal wall 05/13/2016    incarcerated- primarily repaired. Now recurrent      • History of bone density study 05/27/2014   • History of colonoscopy     diagnostic (screening) 19761- Normal colonoscopy.;  Last Performed: 12/10/2015   • History of esophagogastroduodenoscopy     epigastric pain, gastritis without bleeding,gastroesophageal reflux without esophagitis;  Last Performed: 01/06/1999   • History of mammogram 06/29/2016   • History of screening mammography    • Hyperglycemia    • Hyperlipidemia    • Impaired glucose tolerance    • Irritable bowel syndrome    • Measles    • Mechanical complication of internal orthopedic device (CMS/HCC)    • Migraine    • Morbid obesity due to excess calories (CMS/HCC)    • Mumps    • Myopia    • Nausea    • Need for DTaP vaccine 01/17/2015   • Retinal tear    • Spasm of back muscles    • Sprain of ankle    • Umbilical hernia    • Upper respiratory infection    • Urinary tract infectious disease    • Ventral hernia     Open ventral hernioplasty. Incarcerated ventral hernia;  Last Performed: 07/17/2012   • Vitamin D deficiency    • Vitreous detachment    • Vitreous opacities        Allergies   Allergen Reactions   • Kiwi Extract Anaphylaxis   • Lortab [Hydrocodone-Acetaminophen] Confusion and Hallucinations   • Paxil [Paroxetine] Mental Status Change     \"MESSES WITH MY MIND\"   • Dilaudid [Hydromorphone] Unknown - High Severity     Rapid heart rate     • Morphine And Related Itching   • Sulfa Antibiotics Hives and Rash       Past Surgical " History:   Procedure Laterality Date   • CERVICAL CONIZATION      stress urinary incontinence,endocervical dysplasia;  Last Performed: 09/05/1989   • CHOLECYSTECTOMY  08/26/1993    Laparoscopic   • COLONOSCOPY N/A 2/17/2017    Procedure: COLONOSCOPY;  Surgeon: Yuong Villegas MD;  Location: [facility] ENDOSCOPY;  Service:    • CORONARY ANGIOPLASTY      stent to LAD;  Last Performed: 2006   • CORONARY ANGIOPLASTY WITH STENT PLACEMENT     • EPIGASTRIC HERNIA REPAIR  02/02/1973   • GASTRIC SLEEVE LAPAROSCOPIC  11/08/2016   • HYSTERECTOMY  09/05/1989    mmk urethropexy   • TONSILLECTOMY      postoperative tonsillectomy,transfixed bleeding vessel, right tonsillar fossa;  Last Performed: 01/05/1974   • VAGINAL DELIVERY      x 2       Family History   Problem Relation Age of Onset   • Thyroid disease Mother    • Diabetes type II Mother    • Long QT syndrome Mother    • Hypothyroidism Mother    • Breast cancer Mother    • Thyroid disease Father    • Coronary artery disease Father    • Tremor Father    • Diabetes Father    • COPD Father    • Colon cancer Father    • Coronary artery disease Other    • Diabetes Other    • Hypertension Other        Social History     Socioeconomic History   • Marital status:      Spouse name: Milla   • Number of children: 2   • Years of education: Not on file   • Highest education level: Not on file   Tobacco Use   • Smoking status: Never Smoker   • Smokeless tobacco: Never Used   Substance and Sexual Activity   • Alcohol use: No   • Drug use: No   • Sexual activity: Yes     Partners: Male     Birth control/protection: Post-menopausal, Surgical           Objective   Physical Exam  Vitals and nursing note reviewed.   Constitutional:       General: She is not in acute distress.     Appearance: She is well-developed. She is not ill-appearing or diaphoretic.   HENT:      Head: Normocephalic and atraumatic.      Nose: Nose normal.   Eyes:      General: No scleral icterus.     Conjunctiva/sclera:  Conjunctivae normal.   Neck:      Vascular: No JVD.   Cardiovascular:      Rate and Rhythm: Normal rate and regular rhythm.      Heart sounds: Normal heart sounds. No murmur heard.   No friction rub. No gallop.    Pulmonary:      Effort: Pulmonary effort is normal. No respiratory distress.      Breath sounds: No wheezing or rales.   Chest:      Chest wall: No tenderness.   Abdominal:      General: There is no distension.      Palpations: Abdomen is soft. There is no mass.      Tenderness: There is abdominal tenderness. There is right CVA tenderness. There is no guarding or rebound.   Musculoskeletal:         General: No tenderness or deformity. Normal range of motion.      Cervical back: Normal range of motion and neck supple.   Lymphadenopathy:      Cervical: No cervical adenopathy.   Skin:     General: Skin is warm and dry.      Capillary Refill: Capillary refill takes less than 2 seconds.      Coloration: Skin is not pale.      Findings: No erythema or rash.   Neurological:      General: No focal deficit present.      Mental Status: She is alert and oriented to person, place, and time.      Cranial Nerves: No cranial nerve deficit.      Motor: No abnormal muscle tone.   Psychiatric:         Behavior: Behavior normal.         Thought Content: Thought content normal.         Judgment: Judgment normal.         Procedures           ED Course                                 Labs Reviewed   URINALYSIS W/ MICROSCOPIC IF INDICATED (NO CULTURE) - Abnormal; Notable for the following components:       Result Value    Ketones, UA Trace (*)     All other components within normal limits    Narrative:     Urine microscopic not indicated.   COMPREHENSIVE METABOLIC PANEL - Abnormal; Notable for the following components:    Glucose 105 (*)     eGFR Non  Amer 57 (*)     All other components within normal limits    Narrative:     GFR Normal >60  Chronic Kidney Disease <60  Kidney Failure <15     LIPASE - Normal   CBC WITH  AUTO DIFFERENTIAL - Normal   RAINBOW DRAW    Narrative:     The following orders were created for panel order [place] Draw.  Procedure                               Abnormality         Status                     ---------                               -----------         ------                     Green Top (Gel)[[id number]]                                  Final result               Lavender Top[[id number]]                                     Final result               Gold Top - SST[[id number]]                                   Final result               Light Blue Top[[id number]]                                                                Please view results for these tests on the individual orders.   CBC AND DIFFERENTIAL    Narrative:     The following orders were created for panel order CBC & Differential.  Procedure                               Abnormality         Status                     ---------                               -----------         ------                     CBC Auto Differential[[id number]]        Normal              Final result                 Please view results for these tests on the individual orders.   GREEN TOP   LAVENDER TOP   GOLD TOP - SST       CT Abdomen Pelvis Without Contrast   Final Result   Two ventral hernias containing mesenteric fat only above the   umbilicus, also visualized on prior study   Appendix is normal.   Small hiatal hernia.   Prior cholecystectomy and hysterectomy.   Prior gastric sleeve surgery.   No evidence of nephrolithiasis or obstructive uropathy on either   side.   No evidence of bowel obstruction or perienteric inflammation.      Electronically signed by:  Jaspal Houston MD  9/26/2021 4:05 PM CDT   Workstation: [id number]                  [facility]    Final diagnoses:   RLQ abdominal pain       ED Disposition  ED Disposition     ED Disposition Condition Comment    Discharge Stable           Naomi Ross, APRN  [address]  [address]  [facility]  [address] [phone]    On 9/28/2021  If not improved for further evaluation and care         Medication List      New Prescriptions    naproxen 250 MG tablet  Commonly known as: NAPROSYN  Take 1 tablet by mouth 2 (Two) Times a Day As Needed for Moderate Pain .     traMADol 50 MG tablet  Commonly known as: ULTRAM  Take 1 tablet by mouth Every 8 (Eight) Hours As Needed for Moderate Pain .           Where to Get Your Medications      These medications were sent to [facility] [address] [place] [address] [place] [facility] - [phone]  - [phone] [address] [place], [facility] [address]    Phone: [phone]   · naproxen 250 MG tablet  · traMADol 50 MG tablet          Dutch Sharma MD  09/26/21 [id number]

## 2021-10-25 ENCOUNTER — IMMUNIZATION (OUTPATIENT)
Dept: VACCINE CLINIC | Facility: HOSPITAL | Age: 61
End: 2021-10-25

## 2021-10-25 PROCEDURE — 91300 HC SARSCOV02 VAC 30MCG/0.3ML IM: CPT | Performed by: SURGERY

## 2021-10-25 PROCEDURE — 0004A ADM SARSCOV2 30MCG/0.3ML BOOSTER: CPT | Performed by: SURGERY

## 2021-10-27 DIAGNOSIS — E66.09 CLASS 1 OBESITY DUE TO EXCESS CALORIES WITH SERIOUS COMORBIDITY AND BODY MASS INDEX (BMI) OF 33.0 TO 33.9 IN ADULT: ICD-10-CM

## 2021-10-27 RX ORDER — LIRAGLUTIDE 6 MG/ML
INJECTION, SOLUTION SUBCUTANEOUS
Qty: 15 PEN | Refills: 1 | Status: SHIPPED | OUTPATIENT
Start: 2021-10-27 | End: 2021-12-14 | Stop reason: SDUPTHER

## 2021-11-22 DIAGNOSIS — E53.8 VITAMIN B 12 DEFICIENCY: ICD-10-CM

## 2021-11-23 RX ORDER — CYANOCOBALAMIN 1000 UG/ML
INJECTION, SOLUTION INTRAMUSCULAR; SUBCUTANEOUS
Qty: 3 ML | Refills: 3 | Status: SHIPPED | OUTPATIENT
Start: 2021-11-23 | End: 2022-12-15 | Stop reason: SDUPTHER

## 2021-12-02 ENCOUNTER — LAB (OUTPATIENT)
Dept: LAB | Facility: HOSPITAL | Age: 61
End: 2021-12-02

## 2021-12-02 ENCOUNTER — OFFICE VISIT (OUTPATIENT)
Dept: CARDIOLOGY | Facility: CLINIC | Age: 61
End: 2021-12-02

## 2021-12-02 VITALS
BODY MASS INDEX: 32.28 KG/M2 | HEIGHT: 61 IN | WEIGHT: 171 LBS | OXYGEN SATURATION: 98 % | SYSTOLIC BLOOD PRESSURE: 138 MMHG | TEMPERATURE: 96.9 F | HEART RATE: 75 BPM | DIASTOLIC BLOOD PRESSURE: 80 MMHG

## 2021-12-02 DIAGNOSIS — I25.10 CORONARY ARTERY DISEASE INVOLVING NATIVE CORONARY ARTERY OF NATIVE HEART WITHOUT ANGINA PECTORIS: ICD-10-CM

## 2021-12-02 DIAGNOSIS — I10 ESSENTIAL HYPERTENSION: ICD-10-CM

## 2021-12-02 DIAGNOSIS — I25.10 CORONARY ARTERY DISEASE INVOLVING NATIVE CORONARY ARTERY OF NATIVE HEART WITHOUT ANGINA PECTORIS: Primary | ICD-10-CM

## 2021-12-02 DIAGNOSIS — E78.01 FAMILIAL HYPERCHOLESTEROLEMIA: ICD-10-CM

## 2021-12-02 PROCEDURE — 99213 OFFICE O/P EST LOW 20 MIN: CPT | Performed by: INTERNAL MEDICINE

## 2021-12-02 PROCEDURE — 36415 COLL VENOUS BLD VENIPUNCTURE: CPT

## 2021-12-02 PROCEDURE — 80061 LIPID PANEL: CPT

## 2021-12-02 NOTE — PROGRESS NOTES
"Kimberly Means  61 y.o. female      1. Coronary artery disease involving native coronary artery of native heart without angina pectoris    2. Essential hypertension    3. Familial hypercholesterolemia        History of Present Illness:  Kimberly Means is above for coronary artery disease, hypertension hyperlipidemia.  She has done very well from a cardiac standpoint and denied any chest pain, shortness of breath, palpitation, dizziness or syncope.  She has been physically quite active without any restrictions.  Her PCI to left anterior descending coronary artery was back in 2007.  Clinical exam today showed that her heart rate and blood pressure were in the normal range.  No signs of bronchospasm or congestive heart failure was noted.    SUBJECTIVE    Allergies   Allergen Reactions   • Kiwi Extract Anaphylaxis   • Lortab [Hydrocodone-Acetaminophen] Confusion and Hallucinations   • Paxil [Paroxetine] Mental Status Change     \"MESSES WITH MY MIND\"   • Dilaudid [Hydromorphone] Unknown - High Severity     Rapid heart rate     • Morphine And Related Itching   • Sulfa Antibiotics Hives and Rash         Past Medical History:   Diagnosis Date   • Abdominal pain    • Acute bronchitis    • Acute left otitis media    • Acute otitis externa    • Allergic rhinitis     Due to pollen   • Ankle pain    • Asthma    • Astigmatism    • Bacterial pneumonia 12/02/2015   • Contact dermatitis    • Coronary arteriosclerosis     stent to LAD 2006      • Cough    • Depressive disorder     pt states she has not suffered from depression, takes lexapro for anxiety   • Diarrhea    • Dysuria    • Encounter for Papanicolaou smear of cervix 06/01/2009   • Epigastric pain    • Epistaxis    • Essential hypertension    • Fatigue    • Fever    • Functional diarrhea    • Gastroenteritis    • Generalized anxiety disorder 01/2015    given samples of lexapro.   • Heel pain    • Hematochezia    • Hernia of abdominal wall 05/13/2016    incarcerated- primarily " repaired. Now recurrent      • History of bone density study 05/27/2014   • History of colonoscopy     diagnostic (screening) 60811- Normal colonoscopy.;  Last Performed: 12/10/2015   • History of esophagogastroduodenoscopy     epigastric pain, gastritis without bleeding,gastroesophageal reflux without esophagitis;  Last Performed: 01/06/1999   • History of mammogram 06/29/2016   • History of screening mammography    • Hyperglycemia    • Hyperlipidemia    • Impaired glucose tolerance    • Irritable bowel syndrome    • Measles    • Mechanical complication of internal orthopedic device ([facility])    • Migraine    • Morbid obesity due to excess calories ([facility])    • Mumps    • Myopia    • Nausea    • Need for DTaP vaccine 01/17/2015   • Retinal tear    • Spasm of back muscles    • Sprain of ankle    • Umbilical hernia    • Upper respiratory infection    • Urinary tract infectious disease    • Ventral hernia     Open ventral hernioplasty. Incarcerated ventral hernia;  Last Performed: 07/17/2012   • Vitamin D deficiency    • Vitreous detachment    • Vitreous opacities          Past Surgical History:   Procedure Laterality Date   • CERVICAL CONIZATION      stress urinary incontinence,endocervical dysplasia;  Last Performed: 09/05/1989   • CHOLECYSTECTOMY  08/26/1993    Laparoscopic   • COLONOSCOPY N/A 2/17/2017    Procedure: COLONOSCOPY;  Surgeon: Young Villegas MD;  Location: [facility] ENDOSCOPY;  Service:    • CORONARY ANGIOPLASTY      stent to LAD;  Last Performed: 2006   • CORONARY ANGIOPLASTY WITH STENT PLACEMENT     • EPIGASTRIC HERNIA REPAIR  02/02/1973   • GASTRIC SLEEVE LAPAROSCOPIC  11/08/2016   • HYSTERECTOMY  09/05/1989    [facility] urethropexy   • TONSILLECTOMY      postoperative tonsillectomy,transfixed bleeding vessel, right tonsillar fossa;  Last Performed: 01/05/1974   • VAGINAL DELIVERY      x 2         Family History   Problem Relation Age of Onset   • Thyroid disease Mother    • Diabetes type II Mother    • Long QT  syndrome Mother    • Hypothyroidism Mother    • Breast cancer Mother    • Thyroid disease Father    • Coronary artery disease Father    • Tremor Father    • Diabetes Father    • COPD Father    • Colon cancer Father    • Coronary artery disease Other    • Diabetes Other    • Hypertension Other          Social History     Socioeconomic History   • Marital status:      Spouse name: Milla   • Number of children: 2   Tobacco Use   • Smoking status: Never Smoker   • Smokeless tobacco: Never Used   Vaping Use   • Vaping Use: Never used   Substance and Sexual Activity   • Alcohol use: No   • Drug use: No   • Sexual activity: Yes     Partners: Male     Birth control/protection: Post-menopausal, Surgical         Current Outpatient Medications   Medication Sig Dispense Refill   • BD Pen Needle Delores U/F 32G X 4 MM misc USE ONCE DAILY 100 each 4   • butalbital-aspirin-caffeine (FIORINAL) -40 MG capsule Take 1 capsule by mouth Every 4 (Four) Hours As Needed for Headache. 60 capsule 0   • clopidogrel (PLAVIX) 75 MG tablet Take 1 tablet by mouth Daily. 90 tablet 3   • cyanocobalamin 1000 MCG/ML injection INJECT 1ML INTO THE APPROPRIATE MUSCLE AS DIRECTED BY PRESCRIBER EVERY 30 DAYS 3 mL 3   • escitalopram (LEXAPRO) 10 MG tablet Take 1 tablet by mouth Daily. 90 tablet 3   • ezetimibe-simvastatin (VYTORIN) 10-40 MG per tablet Take 1 tablet by mouth Every Night. 90 tablet 3   • fluticasone (FLONASE) 50 MCG/ACT nasal spray 2 sprays into the nostril(s) as directed by provider Daily. Administer 2 sprays in each nostril for each dose. 16 g 5   • metoprolol succinate XL (TOPROL-XL) 25 MG 24 hr tablet Take 1 tablet by mouth Daily. 90 tablet 3   • naproxen (NAPROSYN) 250 MG tablet Take 1 tablet by mouth 2 (Two) Times a Day As Needed for Moderate Pain . 20 tablet 0   • nystatin (MYCOSTATIN) 736604 UNIT/GM cream      • omeprazole (priLOSEC) 20 MG capsule TAKE ONE CAPSULE BY MOUTH DAILY 90 capsule 0   • Saxenda 18 MG/3ML injection  "pen INJECT 3 MG (0.5 ML) UNDER THE SKIN INTO THE APPROPRIATE AREA AS DIRECTED DAILY 15 pen 1   • vitamin D (ERGOCALCIFEROL) 1.25 MG (61875 UT) capsule capsule Take 1 capsule by mouth 2 (Two) Times a Week. 24 capsule 3   • fluconazole (DIFLUCAN) 150 MG tablet Take 1 tablet by mouth Daily. 5 tablet 0   • levoFLOXacin (LEVAQUIN) 500 MG tablet Take 1 tablet by mouth Daily. 7 tablet 0   • traMADol (ULTRAM) 50 MG tablet Take 1 tablet by mouth Every 8 (Eight) Hours As Needed for Moderate Pain . 12 tablet 0     No current facility-administered medications for this visit.         OBJECTIVE    /80   Pulse 75   Temp 96.9 °F (36.1 °C)   Ht 154.9 cm (61\")   Wt 77.6 kg (171 lb)   LMP 04/11/1989 (Within Months)   SpO2 98%   BMI 32.31 kg/m²         Review of Systems : The following systems were reviewed and no changes noted    Constitutional:  Denies recent weight loss, weight gain, fever or chills, no change in exercise tolerance     HENT:  Denies any hearing loss, epistaxis, hoarseness, or difficulty speaking.     Eyes: Wears eyeglasses or contact lenses     Respiratory:  Denies dyspnea with exertion,no cough, wheezing, or hemoptysis.     Cardiovascular: Negative for palpations, chest pain, orthopnea, PND, peripheral edema, syncope, or claudication.     Gastrointestinal:  Denies change in bowel habits, dyspepsia, ulcer disease, hematochezia, or melena.     Endocrine: Negative for cold intolerance, heat intolerance, polydipsia, polyphagia and polyuria.    Genitourinary: Negative.      Musculoskeletal: Denies any history of arthritic symptoms or back problems     Neurological:  Denies any history of recurrent headaches, strokes, TIA, or seizure disorder.     Hematological: Denies any food allergies, seasonal allergies, bleeding disorders, or lymphadenopathy.     Psychiatric/Behavioral: history of depression, no substance abuse, or change in cognitive function.        Physical Exam : The following systems were " reassessed and no changes noted     Constitutional: Cooperative, alert and oriented,  in no acute distress.      HENT:   Head: Normocephalic, normal hair patterns, no masses or tenderness.  Ears, Nose, and Throat: No gross abnormalities. No pallor or cyanosis.   Eyes: EOMS intact, PERRL, conjunctivae and lids unremarkable. Fundoscopic exam and visual fields not performed.   Neck: No palpable masses or adenopathy, no thyromegaly, no JVD, carotid pulses are full and equal bilaterally and without  Bruits.      Cardiovascular: Regular rhythm, S1 and S2 normal, no S3 or S4.  No murmurs, gallops, or rubs detected.      Pulmonary/Chest: Chest: normal symmetry, no tenderness to palpation, normal respiratory excursion, no intercostal retraction, no use of accessory muscles.                                  Pulmonary: Normal breath sounds. No rales or ronchi.     Abdominal: Abdomen soft, bowel sounds normoactive, no masses, no hepatosplenomegaly, non-tender, no bruits.     Musculoskeletal: No deformities, clubbing, cyanosis, erythema, or edema observed.     Neurological: No gross motor or sensory deficits noted, affect appropriate, oriented to time, person, place.      Skin: Warm and dry to the touch, no apparent skin lesions or masses noted.     Psychiatric: She has a normal mood and affect. Her behavior is normal. Judgment and thought content normal.        Lab Results   Component Value Date    WBC 6.28 09/26/2021    HGB 13.8 09/26/2021    HCT 43.0 09/26/2021    MCV 90.7 09/26/2021     09/26/2021     Lab Results   Component Value Date    GLUCOSE 105 (H) 09/26/2021    BUN 14 09/26/2021    CREATININE 0.99 09/26/2021    EGFRIFNONA 57 (L) 09/26/2021    BCR 14.1 09/26/2021    CO2 27.0 09/26/2021    CALCIUM 8.8 09/26/2021    ALBUMIN 4.00 09/26/2021    AST 17 09/26/2021    ALT 13 09/26/2021     Lab Results   Component Value Date    CHOL 140 11/16/2020    CHOL 153 10/25/2019    CHOL 218 (H) 04/16/2019     Lab Results    Component Value Date    TRIG 94 11/16/2020    TRIG 97 10/25/2019    TRIG 195 (H) 04/16/2019     Lab Results   Component Value Date    HDL 67 (H) 11/16/2020    HDL 58 10/25/2019    HDL 59 04/16/2019     No components found for: LDLCALC  Lab Results   Component Value Date    LDL 56 11/16/2020    LDL 76 10/25/2019     (H) 04/16/2019     No results found for: HDLLDLRATIO  No components found for: CHOLHDL  Lab Results   Component Value Date    HGBA1C 5.40 09/23/2019     Lab Results   Component Value Date    TSH 1.410 11/16/2020           ASSESSMENT AND PLAN  Kimberly Means is stable with regard to her heart with no clinical evidence of progression of coronary artery disease.    Antiplatelet therapy with Plavix, Antihypertensive therapy with metoprolol succinate and lipid-lowering therapy with Vytorin has been continued.  Lipid profile is being checked today.  She had lab work done in September 2021 and the results were reviewed.    Diagnoses and all orders for this visit:    1. Coronary artery disease involving native coronary artery of native heart without angina pectoris (Primary)  -     Lipid Panel; Future    2. Essential hypertension  -     Lipid Panel; Future    3. Familial hypercholesterolemia  -     Lipid Panel; Future        Patient's Body mass index is 32.31 kg/m². BMI is above normal parameters. Recommendations include: exercise counseling and nutrition counseling.  Patient is a non-smoker    Nallely Foster MD  12/2/2021  13:24 CST

## 2021-12-03 LAB
CHOLEST SERPL-MCNC: 136 MG/DL (ref 0–200)
HDLC SERPL-MCNC: 65 MG/DL (ref 40–60)
LDLC SERPL CALC-MCNC: 58 MG/DL (ref 0–100)
LDLC/HDLC SERPL: 0.89 {RATIO}
TRIGL SERPL-MCNC: 65 MG/DL (ref 0–150)
VLDLC SERPL-MCNC: 13 MG/DL (ref 5–40)

## 2021-12-07 ENCOUNTER — TELEPHONE (OUTPATIENT)
Dept: CARDIOLOGY | Facility: CLINIC | Age: 61
End: 2021-12-07

## 2021-12-14 ENCOUNTER — TELEPHONE (OUTPATIENT)
Dept: FAMILY MEDICINE CLINIC | Facility: CLINIC | Age: 61
End: 2021-12-14

## 2021-12-14 DIAGNOSIS — E66.09 CLASS 1 OBESITY DUE TO EXCESS CALORIES WITH SERIOUS COMORBIDITY AND BODY MASS INDEX (BMI) OF 33.0 TO 33.9 IN ADULT: ICD-10-CM

## 2021-12-14 RX ORDER — LIRAGLUTIDE 6 MG/ML
3 INJECTION, SOLUTION SUBCUTANEOUS DAILY
Qty: 15 PEN | Refills: 1 | Status: SHIPPED | OUTPATIENT
Start: 2021-12-14 | End: 2022-02-11

## 2022-01-05 ENCOUNTER — TELEPHONE (OUTPATIENT)
Dept: FAMILY MEDICINE CLINIC | Facility: CLINIC | Age: 62
End: 2022-01-05

## 2022-01-08 PROCEDURE — 87635 SARS-COV-2 COVID-19 AMP PRB: CPT | Performed by: NURSE PRACTITIONER

## 2022-01-14 PROCEDURE — 87635 SARS-COV-2 COVID-19 AMP PRB: CPT | Performed by: NURSE PRACTITIONER

## 2022-01-21 PROCEDURE — 87635 SARS-COV-2 COVID-19 AMP PRB: CPT | Performed by: NURSE PRACTITIONER

## 2022-02-11 ENCOUNTER — OFFICE VISIT (OUTPATIENT)
Dept: FAMILY MEDICINE CLINIC | Facility: CLINIC | Age: 62
End: 2022-02-11

## 2022-02-11 ENCOUNTER — DOCUMENTATION (OUTPATIENT)
Dept: FAMILY MEDICINE CLINIC | Facility: CLINIC | Age: 62
End: 2022-02-11

## 2022-02-11 VITALS
HEART RATE: 74 BPM | HEIGHT: 61 IN | BODY MASS INDEX: 33.42 KG/M2 | SYSTOLIC BLOOD PRESSURE: 114 MMHG | OXYGEN SATURATION: 98 % | WEIGHT: 177 LBS | DIASTOLIC BLOOD PRESSURE: 80 MMHG

## 2022-02-11 DIAGNOSIS — Z13.29 SCREENING FOR THYROID DISORDER: ICD-10-CM

## 2022-02-11 DIAGNOSIS — E55.9 VITAMIN D DEFICIENCY: ICD-10-CM

## 2022-02-11 DIAGNOSIS — R09.81 SINUS CONGESTION: ICD-10-CM

## 2022-02-11 DIAGNOSIS — R68.89 FORGETFULNESS: ICD-10-CM

## 2022-02-11 DIAGNOSIS — I10 ESSENTIAL HYPERTENSION: ICD-10-CM

## 2022-02-11 DIAGNOSIS — Z00.00 ANNUAL PHYSICAL EXAM: Primary | ICD-10-CM

## 2022-02-11 DIAGNOSIS — E78.2 MIXED HYPERLIPIDEMIA: ICD-10-CM

## 2022-02-11 DIAGNOSIS — F32.A DEPRESSIVE DISORDER: ICD-10-CM

## 2022-02-11 DIAGNOSIS — G43.009 MIGRAINE WITHOUT AURA AND WITHOUT STATUS MIGRAINOSUS, NOT INTRACTABLE: ICD-10-CM

## 2022-02-11 DIAGNOSIS — Z23 NEED FOR 23-POLYVALENT PNEUMOCOCCAL POLYSACCHARIDE VACCINE: ICD-10-CM

## 2022-02-11 DIAGNOSIS — E66.09 CLASS 1 OBESITY DUE TO EXCESS CALORIES WITH SERIOUS COMORBIDITY AND BODY MASS INDEX (BMI) OF 33.0 TO 33.9 IN ADULT: ICD-10-CM

## 2022-02-11 DIAGNOSIS — Z12.31 ENCOUNTER FOR SCREENING MAMMOGRAM FOR MALIGNANT NEOPLASM OF BREAST: ICD-10-CM

## 2022-02-11 LAB
EXPIRATION DATE: NORMAL
INTERNAL CONTROL: NORMAL
Lab: NORMAL
SARS-COV-2 AG UPPER RESP QL IA.RAPID: NOT DETECTED

## 2022-02-11 PROCEDURE — 99215 OFFICE O/P EST HI 40 MIN: CPT | Performed by: NURSE PRACTITIONER

## 2022-02-11 PROCEDURE — 90732 PPSV23 VACC 2 YRS+ SUBQ/IM: CPT | Performed by: NURSE PRACTITIONER

## 2022-02-11 PROCEDURE — 87426 SARSCOV CORONAVIRUS AG IA: CPT | Performed by: NURSE PRACTITIONER

## 2022-02-11 PROCEDURE — 90471 IMMUNIZATION ADMIN: CPT | Performed by: NURSE PRACTITIONER

## 2022-02-11 RX ORDER — ERGOCALCIFEROL 1.25 MG/1
50000 CAPSULE ORAL
Qty: 90 CAPSULE | Refills: 2
Start: 2022-02-11 | End: 2022-03-17 | Stop reason: SDUPTHER

## 2022-02-11 RX ORDER — CEFDINIR 300 MG/1
300 CAPSULE ORAL 2 TIMES DAILY
Qty: 20 CAPSULE | Refills: 0 | Status: SHIPPED | OUTPATIENT
Start: 2022-02-11 | End: 2022-02-21

## 2022-02-11 NOTE — PATIENT INSTRUCTIONS
Calorie Counting for Weight Loss  Calories are units of energy. Your body needs a certain number of calories from food to keep going throughout the day. When you eat or drink more calories than your body needs, your body stores the extra calories mostly as fat. When you eat or drink fewer calories than your body needs, your body burns fat to get the energy it needs.  Calorie counting means keeping track of how many calories you eat and drink each day. Calorie counting can be helpful if you need to lose weight. If you eat fewer calories than your body needs, you should lose weight. Ask your health care provider what a healthy weight is for you.  For calorie counting to work, you will need to eat the right number of calories each day to lose a healthy amount of weight per week. A dietitian can help you figure out how many calories you need in a day and will suggest ways to reach your calorie goal.  · A healthy amount of weight to lose each week is usually 1-2 lb (0.5-0.9 kg). This usually means that your daily calorie intake should be reduced by 500-750 calories.  · Eating 1,200-1,500 calories a day can help most women lose weight.  · Eating 1,500-1,800 calories a day can help most men lose weight.  What do I need to know about calorie counting?  Work with your health care provider or dietitian to determine how many calories you should get each day. To meet your daily calorie goal, you will need to:  · Find out how many calories are in each food that you would like to eat. Try to do this before you eat.  · Decide how much of the food you plan to eat.  · Keep a food log. Do this by writing down what you ate and how many calories it had.  To successfully lose weight, it is important to balance calorie counting with a healthy lifestyle that includes regular activity.  Where do I find calorie information?    The number of calories in a food can be found on a Nutrition Facts label. If a food does not have a Nutrition Facts  label, try to look up the calories online or ask your dietitian for help.  Remember that calories are listed per serving. If you choose to have more than one serving of a food, you will have to multiply the calories per serving by the number of servings you plan to eat. For example, the label on a package of bread might say that a serving size is 1 slice and that there are 90 calories in a serving. If you eat 1 slice, you will have eaten 90 calories. If you eat 2 slices, you will have eaten 180 calories.  How do I keep a food log?  After each time that you eat, record the following in your food log as soon as possible:  · What you ate. Be sure to include toppings, sauces, and other extras on the food.  · How much you ate. This can be measured in cups, ounces, or number of items.  · How many calories were in each food and drink.  · The total number of calories in the food you ate.  Keep your food log near you, such as in a pocket-sized notebook or on an lisa or website on your mobile phone. Some programs will calculate calories for you and show you how many calories you have left to meet your daily goal.  What are some portion-control tips?  · Know how many calories are in a serving. This will help you know how many servings you can have of a certain food.  · Use a measuring cup to measure serving sizes. You could also try weighing out portions on a kitchen scale. With time, you will be able to estimate serving sizes for some foods.  · Take time to put servings of different foods on your favorite plates or in your favorite bowls and cups so you know what a serving looks like.  · Try not to eat straight from a food's packaging, such as from a bag or box. Eating straight from the package makes it hard to see how much you are eating and can lead to overeating. Put the amount you would like to eat in a cup or on a plate to make sure you are eating the right portion.  · Use smaller plates, glasses, and bowls for smaller  portions and to prevent overeating.  · Try not to multitask. For example, avoid watching TV or using your computer while eating. If it is time to eat, sit down at a table and enjoy your food. This will help you recognize when you are full. It will also help you be more mindful of what and how much you are eating.  What are tips for following this plan?  Reading food labels  · Check the calorie count compared with the serving size. The serving size may be smaller than what you are used to eating.  · Check the source of the calories. Try to choose foods that are high in protein, fiber, and vitamins, and low in saturated fat, trans fat, and sodium.  Shopping  · Read nutrition labels while you shop. This will help you make healthy decisions about which foods to buy.  · Pay attention to nutrition labels for low-fat or fat-free foods. These foods sometimes have the same number of calories or more calories than the full-fat versions. They also often have added sugar, starch, or salt to make up for flavor that was removed with the fat.  · Make a grocery list of lower-calorie foods and stick to it.  Cooking  · Try to cook your favorite foods in a healthier way. For example, try baking instead of frying.  · Use low-fat dairy products.  Meal planning  · Use more fruits and vegetables. One-half of your plate should be fruits and vegetables.  · Include lean proteins, such as chicken, turkey, and fish.  Lifestyle  Each week, aim to do one of the following:  · 150 minutes of moderate exercise, such as walking.  · 75 minutes of vigorous exercise, such as running.  General information  · Know how many calories are in the foods you eat most often. This will help you calculate calorie counts faster.  · Find a way of tracking calories that works for you. Get creative. Try different apps or programs if writing down calories does not work for you.  What foods should I eat?    · Eat nutritious foods. It is better to have a nutritious,  high-calorie food, such as an avocado, than a food with few nutrients, such as a bag of potato chips.  · Use your calories on foods and drinks that will fill you up and will not leave you hungry soon after eating.  ? Examples of foods that fill you up are nuts and nut butters, vegetables, lean proteins, and high-fiber foods such as whole grains. High-fiber foods are foods with more than 5 g of fiber per serving.  · Pay attention to calories in drinks. Low-calorie drinks include water and unsweetened drinks.  The items listed above may not be a complete list of foods and beverages you can eat. Contact a dietitian for more information.  What foods should I limit?  Limit foods or drinks that are not good sources of vitamins, minerals, or protein or that are high in unhealthy fats. These include:  · Candy.  · Other sweets.  · Sodas, specialty coffee drinks, alcohol, and juice.  The items listed above may not be a complete list of foods and beverages you should avoid. Contact a dietitian for more information.  How do I count calories when eating out?  · Pay attention to portions. Often, portions are much larger when eating out. Try these tips to keep portions smaller:  ? Consider sharing a meal instead of getting your own.  ? If you get your own meal, eat only half of it. Before you start eating, ask for a container and put half of your meal into it.  ? When available, consider ordering smaller portions from the menu instead of full portions.  · Pay attention to your food and drink choices. Knowing the way food is cooked and what is included with the meal can help you eat fewer calories.  ? If calories are listed on the menu, choose the lower-calorie options.  ? Choose dishes that include vegetables, fruits, whole grains, low-fat dairy products, and lean proteins.  ? Choose items that are boiled, broiled, grilled, or steamed. Avoid items that are buttered, battered, fried, or served with cream sauce. Items labeled as  crispy are usually fried, unless stated otherwise.  ? Choose water, low-fat milk, unsweetened iced tea, or other drinks without added sugar. If you want an alcoholic beverage, choose a lower-calorie option, such as a glass of wine or light beer.  ? Ask for dressings, sauces, and syrups on the side. These are usually high in calories, so you should limit the amount you eat.  ? If you want a salad, choose a garden salad and ask for grilled meats. Avoid extra toppings such as fulton, cheese, or fried items. Ask for the dressing on the side, or ask for olive oil and vinegar or lemon to use as dressing.  · Estimate how many servings of a food you are given. Knowing serving sizes will help you be aware of how much food you are eating at restaurants.  Where to find more information  · Centers for Disease Control and Prevention: www.cdc.gov  · [place] Department of Agriculture: myplate.gov  Summary  · Calorie counting means keeping track of how many calories you eat and drink each day. If you eat fewer calories than your body needs, you should lose weight.  · A healthy amount of weight to lose per week is usually 1-2 lb (0.5-0.9 kg). This usually means reducing your daily calorie intake by 500-750 calories.  · The number of calories in a food can be found on a Nutrition Facts label. If a food does not have a Nutrition Facts label, try to look up the calories online or ask your dietitian for help.  · Use smaller plates, glasses, and bowls for smaller portions and to prevent overeating.  · Use your calories on foods and drinks that will fill you up and not leave you hungry shortly after a meal.  This information is not intended to replace advice given to you by your health care provider. Make sure you discuss any questions you have with your health care provider.  Document Revised: 01/28/2021 Document Reviewed: 01/28/2021  [employer]vier Patient Education © 2021 [employer]

## 2022-02-11 NOTE — PROGRESS NOTES
"Chief Complaint  Annual Exam    Subjective  Annual physical exam.  Has high blood pressure high cholesterol, depression, obesity and migraines.  Over the past 2 days has been complaining of increasing forgetfulness.  \"Yesterday I forgot my own daughter's name.  It lasted about a minute and then I just did not think anything about it but then today I could not remember the name for cat litter.\"  Denies any neurological symptoms such as facial drooping, numbness, tingling.  Woke up this a.m. with sinus pressure and congestion.  Denies cough, fever, chills and shortness of breath.  \"I really think it is my sinuses.\"        Kimberly Means presents to [facility] [place]  HPI: Annual physical exam    Hypertension  This is a chronic problem. The current episode started more than 1 year ago. The problem is unchanged. The problem is controlled. Associated symptoms include blurred vision. Pertinent negatives include no shortness of breath. There are no associated agents to hypertension. Risk factors for coronary artery disease include dyslipidemia, family history, post-menopausal state, stress and obesity. Past treatments include beta blockers. Current antihypertension treatment includes beta blockers. The current treatment provides significant improvement. There are no compliance problems.  Hypertensive end-organ damage includes CAD/MI.   Hyperlipidemia  This is a chronic problem. The current episode started more than 1 year ago. The problem is controlled. Recent lipid tests were reviewed and are high. Exacerbating diseases include obesity. Factors aggravating her hyperlipidemia include beta blockers and fatty foods. Pertinent negatives include no shortness of breath. Current antihyperlipidemic treatment includes statins and ezetimibe. The current treatment provides moderate improvement of lipids. There are no compliance problems.  Risk factors for coronary artery disease " "include dyslipidemia, family history, hypertension, obesity and post-menopausal.   Depression  Visit Type: follow-up  Patient presents with the following symptoms: depressed mood.  Patient is not experiencing: confusion, shortness of breath, suicidal ideas and thoughts of death.  Frequency of symptoms: occasionally   Severity: mild   Sleep quality: good  Nighttime awakenings: occasional  Compliance with medications:  %        Obesity  This is a chronic problem. The current episode started more than 1 year ago. The problem occurs constantly. The problem has been gradually worsening. Associated symptoms include nausea and vomiting. Pertinent negatives include no chills, coughing, diaphoresis, fatigue or fever. The symptoms are aggravated by drinking and eating. Treatments tried: gastric sleeve and Saxenda  The treatment provided no relief.   Migraine   This is a chronic problem. The current episode started more than 1 year ago. The problem occurs intermittently. The problem has been unchanged. The pain is located in the bilateral region. The pain does not radiate. The pain quality is similar to prior headaches. The quality of the pain is described as throbbing and dull. The pain is at a severity of 8/10. The pain is severe. Associated symptoms include blurred vision, nausea, phonophobia, photophobia and vomiting. Pertinent negatives include no coughing or fever. The symptoms are aggravated by bright light, noise and emotional stress. She has tried oral narcotics for the symptoms. The treatment provided significant relief. Her past medical history is significant for migraine headaches and obesity.       Objective   Vital Signs:   /80   Pulse 74   Ht 154.9 cm (61\")   Wt 80.3 kg (177 lb)   SpO2 98%   BMI 33.44 kg/m²     Physical Exam  Vitals and nursing note reviewed.   Constitutional:       Appearance: Normal appearance. She is well-developed. She is obese.   HENT:      Head: Normocephalic and " atraumatic.      Right Ear: Tympanic membrane, ear canal and external ear normal.      Left Ear: Tympanic membrane, ear canal and external ear normal.      Nose: Mucosal edema present.      Right Sinus: Maxillary sinus tenderness present.      Left Sinus: Maxillary sinus tenderness present.      Mouth/Throat:      Mouth: Mucous membranes are moist.      Pharynx: Oropharynx is clear.   Eyes:      Extraocular Movements: Extraocular movements intact.      Conjunctiva/sclera: Conjunctivae normal.      Pupils: Pupils are equal, round, and reactive to light.   Cardiovascular:      Rate and Rhythm: Normal rate and regular rhythm.      Pulses: Normal pulses.      Heart sounds: Normal heart sounds.   Pulmonary:      Effort: Pulmonary effort is normal.      Breath sounds: Normal breath sounds.      Comments: Bilateral, manual breast exam performed and no dimpling, puckering, masses or nodules palpated      Abdominal:      General: Bowel sounds are normal.      Palpations: Abdomen is soft.   Musculoskeletal:         General: Normal range of motion.      Cervical back: Normal range of motion and neck supple.   Skin:     General: Skin is warm.      Capillary Refill: Capillary refill takes less than 2 seconds.   Neurological:      Mental Status: She is alert and oriented to person, place, and time.   Psychiatric:         Behavior: Behavior normal.        Result Review :     Common labs    Common Labsle 4/30/21 4/30/21 9/26/21 9/26/21 12/2/21    [id number] [id number]    Glucose  91  105 (A)    BUN  15  14    Creatinine  0.73  0.99    eGFR Non African Am  81  57 (A)    Sodium  140  142    Potassium  4.2  3.8    Chloride  106  107    Calcium  9.6  8.8    Albumin  3.90  4.00    Total Bilirubin  0.5  0.3    Alkaline Phosphatase  79  80    AST (SGOT)  35 (A)  17    ALT (SGPT)  27  13    WBC 7.09  6.28     Hemoglobin 14.6  13.8     Hematocrit 45.1  43.0     Platelets 255  226     Total Cholesterol     136   Triglycerides     65   HDL  Cholesterol     65 (A)   LDL Cholesterol      58   (A) Abnormal value       Comments are available for some flowsheets but are not being displayed.                     Assessment and Plan    Diagnoses and all orders for this visit:    1. Annual physical exam (Primary)    2. Essential hypertension  -     CBC & Differential; Future  -     Comprehensive Metabolic Panel; Future    3. Mixed hyperlipidemia  -     Lipid Panel; Future    4. Class 1 obesity due to excess calories with serious comorbidity and body mass index (BMI) of 33.0 to 33.9 in adult  -     naltrexone-bupropion ER (CONTRAVE) 8-90 MG tablet; Wk 1: 1 tab daily, Wk 2: 1 tab twice a day, Wk 3: 2 tabs in AM, 1 tab in PM, Wk 4: 2 tabs twice a day, Maintenance dose: 2 tabs twice daily.  Dispense: 120 tablet; Refill: 3    5. Depressive disorder    6. Migraine without aura and without status migrainosus, not intractable    7. Vitamin D deficiency  -     vitamin D (ERGOCALCIFEROL) 1.25 MG (90547 UT) capsule capsule; Take 1 capsule by mouth Every 7 (Seven) Days.  Dispense: 90 capsule; Refill: 2  -     Vitamin D 25 Hydroxy; Future    8. Forgetfulness  -     CT Head Without Contrast    9. Sinus congestion  -     POCT SARS-CoV-2 Antigen MOON  -     cefdinir (OMNICEF) 300 MG capsule; Take 1 capsule by mouth 2 (Two) Times a Day for 10 days.  Dispense: 20 capsule; Refill: 0    10. Screening for thyroid disorder  -     TSH; Future    11. Encounter for screening mammogram for malignant neoplasm of breast  -     Mammo Screening Digital Tomosynthesis Bilateral With CAD; Future    12. Need for 23-polyvalent pneumococcal polysaccharide vaccine  -     Pneumococcal Polysaccharide Vaccine 23-Valent Greater Than or Equal To 3yo Subcutaneous / IM    1.  Annual physical exam:  Continue on current medications as previously prescribed   Counseling on importance of heathy eating habits and regular physical activity regimen on improving overall physical and mental health.     2.   Essential hypertension:  Continue on metoprolol succinate as previously prescribed  Continue on reduced sodium diet of no more than 1500 mg/day  Avoid use of table salt    3.  Mixed hyperlipidemia:  Complete fasting lipid panel as ordered will notify of results when available  Continue to adhere to a low-fat diet  Continue on Vytorin as previously prescribed    4.  Class I obesity due to excess calories with serious comorbidity and a body mass index of 33.0-33.9 in adult:  Begin Contrave as prescribed  Educated on possible side effects of this medication including not limited to increased risk for worsening of depression or thoughts of self-harm  Patient's Body mass index is 33.44 kg/m². indicating that she is overweight (BMI 25-29.9). Patient's (Body mass index is 33.44 kg/m².) indicates that they are overweight with health conditions that include hypertension and dyslipidemias . Weight is worsening. BMI is is above average; BMI management plan is completed. We discussed portion control, increasing exercise and pharmacologic options including Contrave. .    5.  Depressive disorder,  Continue Lexapro as previously prescribed  Encouraged to seek emergency medical treatment for any new or worsening thoughts of hopelessness, helplessness or self-harm    6.  Migraine without aura and with status migrainosus, not intractable:  Continue on Fioricet as previously prescribed  Seek solace in a dark, quiet environment at onset of headache to reduce exacerbation to migraine    7.  Vitamin D deficiency:  Complete vitamin D level as ordered will notify results when available  Continue on vitamin D supplement as previously prescribed and refill prescription sent to pharmacy    8.  Forgetfulness:  MMSE performed in office and resulted as 27 out of 30  Radiology will call to schedule CT of the head and will notify of results when available  Encouraged to record any further episodes and write down what time of day and what is  occurring in surroundings    9.  Sinus congestion:  Rapid Covid test performed in office and resulted as negative  Begin Omnicef as prescribed  Educated on importance of completing full dose of antibiotic therapy  Continue on Flonase as previously prescribed    10.  Screening for thyroid disorder:  Complete TSH as ordered and will notify of results when available    11.  Encounter for screening mammogram for malignant neoplasm of the breast:  Radiology will call to schedule bilateral mammogram  Encouraged bi-monthly self breast exams at home    12.  Need for 23 polyvalent pneumococcal polysaccharide vaccine:  Pneumovax 23 given IM in office  Educated on possible side effects of this medication including not limited to increased risk for pain, swelling and redness of injection site    I spent 48 minutes caring for Kimberly on this date of service. This time includes time spent by me in the following activities:preparing for the visit, reviewing tests, obtaining and/or reviewing a separately obtained history, performing a medically appropriate examination and/or evaluation , counseling and educating the patient/family/caregiver, ordering medications, tests, or procedures and documenting information in the medical record  Follow Up   Return in about 6 months (around 8/11/2022) for Recheck.  Patient was given instructions and counseling regarding her condition or for health maintenance advice. Please see specific information pulled into the AVS if appropriate.         This document has been electronically signed by NICOLLE Gomez on February 11, 2022 13:52 CST

## 2022-03-02 ENCOUNTER — TELEPHONE (OUTPATIENT)
Dept: FAMILY MEDICINE CLINIC | Facility: CLINIC | Age: 62
End: 2022-03-02

## 2022-03-02 NOTE — TELEPHONE ENCOUNTER
At this time she said she has not had any more episodes.    She said if she has another one she will go to the ER.     She said she does not want to go ess someone if northing is going on.   So she will wait on the referral to Neurology and just go to the Er if she has another episode.     GEOVANNA Acevedo    ----- Message from NICOLLE Gomez sent at 3/2/2022  6:07 AM CST -----  Her insurance company denied the CT. I can refer her to a neurologist for further evaluation and they will order a CT/MRI may be able to get approved. Please ask her if she would like me to send in the referral? If she does he would she like to see?

## 2022-03-07 ENCOUNTER — OFFICE VISIT (OUTPATIENT)
Dept: FAMILY MEDICINE CLINIC | Facility: CLINIC | Age: 62
End: 2022-03-07

## 2022-03-07 VITALS
RESPIRATION RATE: 22 BRPM | DIASTOLIC BLOOD PRESSURE: 74 MMHG | HEIGHT: 61 IN | WEIGHT: 173 LBS | OXYGEN SATURATION: 98 % | SYSTOLIC BLOOD PRESSURE: 116 MMHG | HEART RATE: 67 BPM | BODY MASS INDEX: 32.66 KG/M2 | TEMPERATURE: 98.4 F

## 2022-03-07 DIAGNOSIS — R39.9 UTI SYMPTOMS: Primary | ICD-10-CM

## 2022-03-07 DIAGNOSIS — N30.00 ACUTE CYSTITIS WITHOUT HEMATURIA: ICD-10-CM

## 2022-03-07 LAB
BILIRUB BLD-MCNC: ABNORMAL MG/DL
CLARITY, POC: ABNORMAL
COLOR UR: ABNORMAL
GLUCOSE UR STRIP-MCNC: NEGATIVE MG/DL
KETONES UR QL: ABNORMAL
LEUKOCYTE EST, POC: ABNORMAL
NITRITE UR-MCNC: POSITIVE MG/ML
PH UR: 5.5 [PH] (ref 5–8)
PROT UR STRIP-MCNC: ABNORMAL MG/DL
RBC # UR STRIP: NEGATIVE /UL
SP GR UR: 1.03 (ref 1–1.03)
UROBILINOGEN UR QL: NORMAL

## 2022-03-07 PROCEDURE — 81002 URINALYSIS NONAUTO W/O SCOPE: CPT | Performed by: NURSE PRACTITIONER

## 2022-03-07 PROCEDURE — 99213 OFFICE O/P EST LOW 20 MIN: CPT | Performed by: NURSE PRACTITIONER

## 2022-03-07 RX ORDER — NITROFURANTOIN 25; 75 MG/1; MG/1
100 CAPSULE ORAL 2 TIMES DAILY
Qty: 10 CAPSULE | Refills: 0 | Status: SHIPPED | OUTPATIENT
Start: 2022-03-07 | End: 2022-05-11

## 2022-03-07 RX ORDER — PHENAZOPYRIDINE HYDROCHLORIDE 200 MG/1
200 TABLET, FILM COATED ORAL 3 TIMES DAILY PRN
Status: CANCELLED | OUTPATIENT
Start: 2022-03-07

## 2022-03-07 RX ORDER — PHENAZOPYRIDINE HYDROCHLORIDE 100 MG/1
100 TABLET, FILM COATED ORAL 3 TIMES DAILY PRN
Qty: 30 TABLET | Refills: 0 | Status: SHIPPED | OUTPATIENT
Start: 2022-03-07 | End: 2022-05-11

## 2022-03-07 RX ORDER — FLUCONAZOLE 150 MG/1
150 TABLET ORAL ONCE
Qty: 1 TABLET | Refills: 0 | Status: SHIPPED | OUTPATIENT
Start: 2022-03-07 | End: 2022-03-07

## 2022-03-07 NOTE — PROGRESS NOTES
"Chief Complaint  Urinary Tract Infection    Subjective          Kimberly Means [facility] [place]. She has been getting recurrent UTIs. Feels overall fatigued and just \"doesn't feel good\".   Urinary Tract Infection   This is a recurrent problem. The current episode started in the past 7 days. The problem occurs every urination. The problem has been gradually worsening. The pain is at a severity of 6/10. Maximum temperature: Taking Tylenol. Associated symptoms include frequency, nausea and urgency. Pertinent negatives include no chills. She has tried increased fluids for the symptoms. The treatment provided no relief. Her past medical history is significant for recurrent UTIs.     Outpatient Medications Prior to Visit   Medication Sig Dispense Refill   • butalbital-aspirin-caffeine (FIORINAL) -40 MG capsule Take 1 capsule by mouth Every 4 (Four) Hours As Needed for Headache. 60 capsule 0   • clopidogrel (PLAVIX) 75 MG tablet Take 1 tablet by mouth Daily. 90 tablet 3   • cyanocobalamin 1000 MCG/ML injection INJECT 1ML INTO THE APPROPRIATE MUSCLE AS DIRECTED BY PRESCRIBER EVERY 30 DAYS 3 mL 3   • escitalopram (LEXAPRO) 10 MG tablet Take 1 tablet by mouth Daily. 90 tablet 3   • ezetimibe-simvastatin (VYTORIN) 10-40 MG per tablet Take 1 tablet by mouth Every Night. 90 tablet 3   • fluticasone (FLONASE) 50 MCG/ACT nasal spray 2 sprays into the nostril(s) as directed by provider Daily. Administer 2 sprays in each nostril for each dose. 16 g 5   • metoprolol succinate XL (TOPROL-XL) 25 MG 24 hr tablet Take 1 tablet by mouth Daily. 90 tablet 3   • naltrexone-bupropion ER (CONTRAVE) 8-90 MG tablet Wk 1: 1 tab daily, Wk 2: 1 tab twice a day, Wk 3: 2 tabs in AM, 1 tab in PM, Wk 4: 2 tabs twice a day, Maintenance dose: 2 tabs twice daily. 120 tablet 3   • naproxen (NAPROSYN) 250 MG tablet Take 1 tablet by mouth 2 (Two) Times a Day As Needed for " "Moderate Pain . 20 tablet 0   • nystatin (MYCOSTATIN) 223995 UNIT/GM cream      • omeprazole (priLOSEC) 20 MG capsule TAKE ONE CAPSULE BY MOUTH DAILY 90 capsule 0   • vitamin D (ERGOCALCIFEROL) 1.25 MG (97023 UT) capsule capsule Take 1 capsule by mouth Every 7 (Seven) Days. 90 capsule 2     No facility-administered medications prior to visit.       Review of Systems   Constitutional: Positive for fatigue. Negative for activity change, appetite change and chills.   HENT: Negative for congestion, ear pain, sore throat and trouble swallowing.    Eyes: Negative for discharge, itching and visual disturbance.   Respiratory: Negative for apnea, cough and wheezing.    Cardiovascular: Negative for chest pain and leg swelling.   Gastrointestinal: Positive for nausea. Negative for abdominal distention, constipation and diarrhea.   Endocrine: Negative for cold intolerance, heat intolerance and polyuria.   Genitourinary: Positive for frequency, pelvic pain and urgency. Negative for dysuria.   Musculoskeletal: Positive for arthralgias and myalgias. Negative for back pain.   Skin: Negative for color change, pallor and wound.   Neurological: Negative for dizziness, seizures, syncope, weakness and light-headedness.   Psychiatric/Behavioral: Negative for agitation, confusion and sleep disturbance. The patient is not nervous/anxious.          Objective   Vital Signs:   Visit Vitals  /74 (BP Location: Left arm, Patient Position: Sitting, Cuff Size: Adult)   Pulse 67   Temp 98.4 °F (36.9 °C)   Resp 22   Ht 154.9 cm (61\")   Wt 78.5 kg (173 lb)   LMP 04/11/1989 (Within Months)   SpO2 98%   BMI 32.69 kg/m²     Physical Exam  Vitals and nursing note reviewed.   Constitutional:       Appearance: She is well-developed.   HENT:      Head: Normocephalic and atraumatic.   Eyes:      General: Lids are normal.      Conjunctiva/sclera: Conjunctivae normal.   Neck:      Thyroid: No thyroid mass or thyromegaly.      Trachea: Trachea normal. No " tracheal tenderness.   Cardiovascular:      Rate and Rhythm: Normal rate.      Pulses: Normal pulses.      Heart sounds: Normal heart sounds.   Pulmonary:      Effort: Pulmonary effort is normal. No respiratory distress.      Breath sounds: Normal breath sounds. No wheezing.   Abdominal:      General: There is no distension.      Palpations: Abdomen is soft. There is no mass.   Musculoskeletal:         General: Normal range of motion.      Cervical back: Normal range of motion. No edema.   Lymphadenopathy:      Head:      Right side of head: No submental, submandibular or tonsillar adenopathy.      Left side of head: No submental, submandibular or tonsillar adenopathy.   Skin:     General: Skin is warm and dry.      Coloration: Skin is not pale.      Findings: No abrasion, erythema or lesion.   Neurological:      Mental Status: She is alert and oriented to person, place, and time.   Psychiatric:         Mood and Affect: Mood is not anxious. Affect is not inappropriate.         Speech: Speech normal.         Behavior: Behavior normal.         Thought Content: Thought content normal.         Judgment: Judgment normal. Judgment is not impulsive.        Result Review :                 Assessment and Plan    Diagnoses and all orders for this visit:    1. UTI symptoms (Primary)  -     POCT urinalysis dipstick, manual    2. Acute cystitis without hematuria  -     fluconazole (Diflucan) 150 MG tablet; Take 1 tablet by mouth 1 (One) Time for 1 dose.  Dispense: 1 tablet; Refill: 0  -     phenazopyridine (Pyridium) 100 MG tablet; Take 1 tablet by mouth 3 (Three) Times a Day As Needed for Bladder Spasms.  Dispense: 30 tablet; Refill: 0  -     nitrofurantoin, macrocrystal-monohydrate, (Macrobid) 100 MG capsule; Take 1 capsule by mouth 2 (Two) Times a Day.  Dispense: 10 capsule; Refill: 0        POC urine positive for UTI     Please call the office if symptoms persist or worsen     Increase fluids, continue Tylenol as needed          I spent 25 minutes caring for Kimberly on this date of service. This time includes time spent by me in the following activities:preparing for the visit, performing a medically appropriate examination and/or evaluation , counseling and educating the patient/family/caregiver, ordering medications, tests, or procedures, documenting information in the medical record and independently interpreting results and communicating that information with the patient/family/caregiver  Follow Up   Return if symptoms worsen or fail to improve, for Next scheduled follow up.  Patient was given instructions and counseling regarding her condition or for health maintenance advice. Please see specific information pulled into the AVS if appropriate.           This document has been electronically signed by NICOLLE Osborne on March 7, 2022 10:33 CST

## 2022-03-09 ENCOUNTER — LAB (OUTPATIENT)
Dept: LAB | Facility: HOSPITAL | Age: 62
End: 2022-03-09

## 2022-03-09 DIAGNOSIS — E78.2 MIXED HYPERLIPIDEMIA: ICD-10-CM

## 2022-03-09 DIAGNOSIS — N39.0 URINARY TRACT INFECTION WITHOUT HEMATURIA, SITE UNSPECIFIED: ICD-10-CM

## 2022-03-09 DIAGNOSIS — E55.9 VITAMIN D DEFICIENCY: ICD-10-CM

## 2022-03-09 DIAGNOSIS — R39.9 UTI SYMPTOMS: Primary | ICD-10-CM

## 2022-03-09 DIAGNOSIS — Z13.29 SCREENING FOR THYROID DISORDER: ICD-10-CM

## 2022-03-09 DIAGNOSIS — I10 ESSENTIAL HYPERTENSION: ICD-10-CM

## 2022-03-09 LAB
25(OH)D3 SERPL-MCNC: 31 NG/ML (ref 30–100)
ALBUMIN SERPL-MCNC: 4.2 G/DL (ref 3.5–5.2)
ALBUMIN/GLOB SERPL: 1.4 G/DL
ALP SERPL-CCNC: 76 U/L (ref 39–117)
ALT SERPL W P-5'-P-CCNC: 17 U/L (ref 1–33)
ANION GAP SERPL CALCULATED.3IONS-SCNC: 9.7 MMOL/L (ref 5–15)
AST SERPL-CCNC: 16 U/L (ref 1–32)
BASOPHILS # BLD AUTO: 0.05 10*3/MM3 (ref 0–0.2)
BASOPHILS NFR BLD AUTO: 0.7 % (ref 0–1.5)
BILIRUB SERPL-MCNC: 0.5 MG/DL (ref 0–1.2)
BUN SERPL-MCNC: 15 MG/DL (ref 8–23)
BUN/CREAT SERPL: 19.2 (ref 7–25)
CALCIUM SPEC-SCNC: 9.2 MG/DL (ref 8.6–10.5)
CHLORIDE SERPL-SCNC: 104 MMOL/L (ref 98–107)
CHOLEST SERPL-MCNC: 176 MG/DL (ref 0–200)
CO2 SERPL-SCNC: 26.3 MMOL/L (ref 22–29)
CREAT SERPL-MCNC: 0.78 MG/DL (ref 0.57–1)
DEPRECATED RDW RBC AUTO: 44.6 FL (ref 37–54)
EGFRCR SERPLBLD CKD-EPI 2021: 85.5 ML/MIN/1.73
EOSINOPHIL # BLD AUTO: 0.11 10*3/MM3 (ref 0–0.4)
EOSINOPHIL NFR BLD AUTO: 1.5 % (ref 0.3–6.2)
ERYTHROCYTE [DISTWIDTH] IN BLOOD BY AUTOMATED COUNT: 13.2 % (ref 12.3–15.4)
GLOBULIN UR ELPH-MCNC: 3 GM/DL
GLUCOSE SERPL-MCNC: 78 MG/DL (ref 65–99)
HCT VFR BLD AUTO: 44.6 % (ref 34–46.6)
HDLC SERPL-MCNC: 66 MG/DL (ref 40–60)
HGB BLD-MCNC: 14.3 G/DL (ref 12–15.9)
IMM GRANULOCYTES # BLD AUTO: 0.01 10*3/MM3 (ref 0–0.05)
IMM GRANULOCYTES NFR BLD AUTO: 0.1 % (ref 0–0.5)
LDLC SERPL CALC-MCNC: 88 MG/DL (ref 0–100)
LDLC/HDLC SERPL: 1.29 {RATIO}
LYMPHOCYTES # BLD AUTO: 2.35 10*3/MM3 (ref 0.7–3.1)
LYMPHOCYTES NFR BLD AUTO: 32.2 % (ref 19.6–45.3)
MCH RBC QN AUTO: 29.4 PG (ref 26.6–33)
MCHC RBC AUTO-ENTMCNC: 32.1 G/DL (ref 31.5–35.7)
MCV RBC AUTO: 91.6 FL (ref 79–97)
MONOCYTES # BLD AUTO: 0.56 10*3/MM3 (ref 0.1–0.9)
MONOCYTES NFR BLD AUTO: 7.7 % (ref 5–12)
NEUTROPHILS NFR BLD AUTO: 4.22 10*3/MM3 (ref 1.7–7)
NEUTROPHILS NFR BLD AUTO: 57.8 % (ref 42.7–76)
NRBC BLD AUTO-RTO: 0 /100 WBC (ref 0–0.2)
PLATELET # BLD AUTO: 236 10*3/MM3 (ref 140–450)
PMV BLD AUTO: 9.6 FL (ref 6–12)
POTASSIUM SERPL-SCNC: 4.1 MMOL/L (ref 3.5–5.2)
PROT SERPL-MCNC: 7.2 G/DL (ref 6–8.5)
RBC # BLD AUTO: 4.87 10*6/MM3 (ref 3.77–5.28)
SODIUM SERPL-SCNC: 140 MMOL/L (ref 136–145)
TRIGL SERPL-MCNC: 125 MG/DL (ref 0–150)
TSH SERPL DL<=0.05 MIU/L-ACNC: 1.46 UIU/ML (ref 0.27–4.2)
VLDLC SERPL-MCNC: 22 MG/DL (ref 5–40)
WBC NRBC COR # BLD: 7.3 10*3/MM3 (ref 3.4–10.8)

## 2022-03-09 PROCEDURE — 81001 URINALYSIS AUTO W/SCOPE: CPT

## 2022-03-09 PROCEDURE — 87086 URINE CULTURE/COLONY COUNT: CPT

## 2022-03-09 PROCEDURE — 80050 GENERAL HEALTH PANEL: CPT

## 2022-03-09 PROCEDURE — 82306 VITAMIN D 25 HYDROXY: CPT

## 2022-03-09 PROCEDURE — 80061 LIPID PANEL: CPT

## 2022-03-09 RX ORDER — CIPROFLOXACIN 500 MG/1
500 TABLET, FILM COATED ORAL 2 TIMES DAILY
Qty: 14 TABLET | Refills: 0 | Status: SHIPPED | OUTPATIENT
Start: 2022-03-09 | End: 2022-05-11

## 2022-03-10 LAB
BACTERIA UR QL AUTO: ABNORMAL /HPF
BILIRUB UR QL STRIP: NEGATIVE
CLARITY UR: ABNORMAL
COD CRY URNS QL: ABNORMAL /HPF
COLOR UR: ABNORMAL
GLUCOSE UR STRIP-MCNC: NEGATIVE MG/DL
HGB UR QL STRIP.AUTO: NEGATIVE
HYALINE CASTS UR QL AUTO: ABNORMAL /LPF
KETONES UR QL STRIP: ABNORMAL
LEUKOCYTE ESTERASE UR QL STRIP.AUTO: ABNORMAL
NITRITE UR QL STRIP: POSITIVE
PH UR STRIP.AUTO: 5.5 [PH] (ref 5–8)
PROT UR QL STRIP: ABNORMAL
RBC # UR STRIP: ABNORMAL /HPF
REF LAB TEST METHOD: ABNORMAL
SP GR UR STRIP: 1.03 (ref 1–1.03)
SQUAMOUS #/AREA URNS HPF: ABNORMAL /HPF
UROBILINOGEN UR QL STRIP: ABNORMAL
WBC # UR STRIP: ABNORMAL /HPF
YEAST URNS QL MICRO: ABNORMAL /HPF

## 2022-03-11 LAB — BACTERIA SPEC AEROBE CULT: NORMAL

## 2022-03-15 ENCOUNTER — TELEPHONE (OUTPATIENT)
Dept: FAMILY MEDICINE CLINIC | Facility: CLINIC | Age: 62
End: 2022-03-15

## 2022-03-15 NOTE — TELEPHONE ENCOUNTER
Per NICOLLE Salgado, Ms. Means has been called with recent lab results & recommendations.  Continue current medications and follow-up as planned or sooner if any problems.       ----- Message from NICOLLE Gomez sent at 3/15/2022  6:51 AM CDT -----  Labs normal, please call or send card!

## 2022-03-15 NOTE — PROGRESS NOTES
Per NICOLLE Salgado, Ms. Means has been called with recent lab results & recommendations.  Continue current medications and follow-up as planned or sooner if any problems.

## 2022-03-17 DIAGNOSIS — E55.9 VITAMIN D DEFICIENCY: ICD-10-CM

## 2022-03-17 RX ORDER — ERGOCALCIFEROL 1.25 MG/1
50000 CAPSULE ORAL
Qty: 12 CAPSULE | Refills: 3 | Status: SHIPPED | OUTPATIENT
Start: 2022-03-17 | End: 2023-03-13 | Stop reason: SDUPTHER

## 2022-03-22 DIAGNOSIS — E78.2 MIXED HYPERLIPIDEMIA: ICD-10-CM

## 2022-03-22 RX ORDER — EZETIMIBE AND SIMVASTATIN 10; 40 MG/1; MG/1
1 TABLET ORAL NIGHTLY
Qty: 90 TABLET | Refills: 3 | Status: SHIPPED | OUTPATIENT
Start: 2022-03-22 | End: 2023-03-30 | Stop reason: SDUPTHER

## 2022-03-23 DIAGNOSIS — F41.1 GENERALIZED ANXIETY DISORDER: ICD-10-CM

## 2022-03-23 RX ORDER — ESCITALOPRAM OXALATE 10 MG/1
10 TABLET ORAL DAILY
Qty: 90 TABLET | Refills: 3 | Status: SHIPPED | OUTPATIENT
Start: 2022-03-23 | End: 2022-10-27

## 2022-05-05 DIAGNOSIS — I25.10 CORONARY ARTERIOSCLEROSIS: ICD-10-CM

## 2022-05-05 RX ORDER — CLOPIDOGREL BISULFATE 75 MG/1
75 TABLET ORAL DAILY
Qty: 90 TABLET | Refills: 3 | Status: SHIPPED | OUTPATIENT
Start: 2022-05-05

## 2022-05-27 DIAGNOSIS — E66.09 CLASS 1 OBESITY DUE TO EXCESS CALORIES WITH SERIOUS COMORBIDITY AND BODY MASS INDEX (BMI) OF 33.0 TO 33.9 IN ADULT: Primary | ICD-10-CM

## 2022-05-27 RX ORDER — SEMAGLUTIDE 0.25 MG/.5ML
0.25 INJECTION, SOLUTION SUBCUTANEOUS WEEKLY
Qty: 6 ML | Refills: 3 | Status: SHIPPED | OUTPATIENT
Start: 2022-05-27 | End: 2022-06-01 | Stop reason: SDUPTHER

## 2022-06-01 ENCOUNTER — TELEPHONE (OUTPATIENT)
Dept: FAMILY MEDICINE CLINIC | Facility: CLINIC | Age: 62
End: 2022-06-01

## 2022-06-01 DIAGNOSIS — E66.09 CLASS 1 OBESITY DUE TO EXCESS CALORIES WITH SERIOUS COMORBIDITY AND BODY MASS INDEX (BMI) OF 33.0 TO 33.9 IN ADULT: ICD-10-CM

## 2022-06-01 RX ORDER — SEMAGLUTIDE 0.25 MG/.5ML
0.25 INJECTION, SOLUTION SUBCUTANEOUS WEEKLY
Qty: 6 ML | Refills: 3 | Status: SHIPPED | OUTPATIENT
Start: 2022-06-01 | End: 2022-06-01

## 2022-06-01 NOTE — TELEPHONE ENCOUNTER
Kimberly said, [facility] didn't receive the rx that you sent in for her to take once a week.  She thinks it is Ozempic.  [facility] said, they never received this.

## 2022-06-08 ENCOUNTER — OFFICE VISIT (OUTPATIENT)
Dept: CARDIOLOGY | Facility: CLINIC | Age: 62
End: 2022-06-08

## 2022-06-08 VITALS
WEIGHT: 178 LBS | OXYGEN SATURATION: 98 % | BODY MASS INDEX: 33.61 KG/M2 | DIASTOLIC BLOOD PRESSURE: 88 MMHG | HEART RATE: 63 BPM | HEIGHT: 61 IN | SYSTOLIC BLOOD PRESSURE: 128 MMHG

## 2022-06-08 DIAGNOSIS — I25.10 CORONARY ARTERY DISEASE, UNSPECIFIED VESSEL OR LESION TYPE, UNSPECIFIED WHETHER ANGINA PRESENT, UNSPECIFIED WHETHER NATIVE OR TRANSPLANTED HEART: Primary | ICD-10-CM

## 2022-06-08 DIAGNOSIS — I10 ESSENTIAL HYPERTENSION: ICD-10-CM

## 2022-06-08 DIAGNOSIS — E78.01 FAMILIAL HYPERCHOLESTEROLEMIA: ICD-10-CM

## 2022-06-08 DIAGNOSIS — I25.10 CORONARY ARTERY DISEASE INVOLVING NATIVE CORONARY ARTERY OF NATIVE HEART WITHOUT ANGINA PECTORIS: ICD-10-CM

## 2022-06-08 PROCEDURE — 99213 OFFICE O/P EST LOW 20 MIN: CPT | Performed by: INTERNAL MEDICINE

## 2022-06-08 PROCEDURE — 93000 ELECTROCARDIOGRAM COMPLETE: CPT | Performed by: INTERNAL MEDICINE

## 2022-06-08 NOTE — PROGRESS NOTES
"Kimberly Means  61 y.o. female      1. Coronary artery disease, unspecified vessel or lesion type, unspecified whether angina present, unspecified whether native or transplanted heart    2. Coronary artery disease involving native coronary artery of native heart without angina pectoris    3. Essential hypertension    4. Familial hypercholesterolemia        History of Present Illness:  Kimberly Means is here for follow-up of coronary artery disease, hypertension hyperlipidemia.    She has done well and denied any cardiac symptoms with no chest pain, shortness of breath or palpitation.  She has been quite active without any restrictions.    Her PCI to left anterior descending coronary artery was back in 2007.    EKG today showed sinus rhythm with heart rate of 63 bpm. Mild baseline artifacts.  Minimal nonspecific T wave changes    SUBJECTIVE    Allergies   Allergen Reactions   • Kiwi Extract Anaphylaxis   • Lortab [Hydrocodone-Acetaminophen] Confusion and Hallucinations   • Paxil [Paroxetine] Mental Status Change     \"MESSES WITH MY MIND\"   • Dilaudid [Hydromorphone] Unknown - High Severity     Rapid heart rate     • Morphine And Related Itching   • Sulfa Antibiotics Hives and Rash         Past Medical History:   Diagnosis Date   • Abdominal pain    • Acute bronchitis    • Acute left otitis media    • Acute otitis externa    • Allergic rhinitis     Due to pollen   • Ankle pain    • Asthma    • Astigmatism    • Bacterial pneumonia 12/02/2015   • Contact dermatitis    • Coronary arteriosclerosis     stent to LAD 2006      • Cough    • Depressive disorder     pt states she has not suffered from depression, takes lexapro for anxiety   • Diarrhea    • Dysuria    • Encounter for Papanicolaou smear of cervix 06/01/2009   • Epigastric pain    • Epistaxis    • Essential hypertension    • Fatigue    • Fever    • Functional diarrhea    • Gastroenteritis    • Generalized anxiety disorder 01/2015    given samples of lexapro.   • " Heel pain    • Hematochezia    • Hernia of abdominal wall 05/13/2016    incarcerated- primarily repaired. Now recurrent      • History of bone density study 05/27/2014   • History of colonoscopy     diagnostic (screening) 22334- Normal colonoscopy.;  Last Performed: 12/10/2015   • History of esophagogastroduodenoscopy     epigastric pain, gastritis without bleeding,gastroesophageal reflux without esophagitis;  Last Performed: 01/06/1999   • History of mammogram 06/29/2016   • History of screening mammography    • Hyperglycemia    • Hyperlipidemia    • Impaired glucose tolerance    • Irritable bowel syndrome    • Measles    • Mechanical complication of internal orthopedic device ([facility])    • Migraine    • Morbid obesity due to excess calories ([facility])    • Mumps    • Myopia    • Nausea    • Need for DTaP vaccine 01/17/2015   • Retinal tear    • Spasm of back muscles    • Sprain of ankle    • Umbilical hernia    • Upper respiratory infection    • Urinary tract infectious disease    • Ventral hernia     Open ventral hernioplasty. Incarcerated ventral hernia;  Last Performed: 07/17/2012   • Vitamin D deficiency    • Vitreous detachment    • Vitreous opacities          Past Surgical History:   Procedure Laterality Date   • CERVICAL CONIZATION      stress urinary incontinence,endocervical dysplasia;  Last Performed: 09/05/1989   • CHOLECYSTECTOMY  08/26/1993    Laparoscopic   • COLONOSCOPY N/A 2/17/2017    Procedure: COLONOSCOPY;  Surgeon: Young Villegas MD;  Location: [facility] ENDOSCOPY;  Service:    • CORONARY ANGIOPLASTY      stent to LAD;  Last Performed: 2006   • CORONARY ANGIOPLASTY WITH STENT PLACEMENT     • EPIGASTRIC HERNIA REPAIR  02/02/1973   • GASTRIC SLEEVE LAPAROSCOPIC  11/08/2016   • HYSTERECTOMY  09/05/1989    mmk urethropexy   • TONSILLECTOMY      postoperative tonsillectomy,transfixed bleeding vessel, right tonsillar fossa;  Last Performed: 01/05/1974   • VAGINAL DELIVERY      x 2         Family History    Problem Relation Age of Onset   • Thyroid disease Mother    • Diabetes type II Mother    • Long QT syndrome Mother    • Hypothyroidism Mother    • Breast cancer Mother    • Thyroid disease Father    • Coronary artery disease Father    • Tremor Father    • Diabetes Father    • COPD Father    • Colon cancer Father    • Coronary artery disease Other    • Diabetes Other    • Hypertension Other          Social History     Socioeconomic History   • Marital status:      Spouse name: Milla   • Number of children: 2   Tobacco Use   • Smoking status: Never Smoker   • Smokeless tobacco: Never Used   Vaping Use   • Vaping Use: Never used   Substance and Sexual Activity   • Alcohol use: No   • Drug use: No   • Sexual activity: Yes     Partners: Male     Birth control/protection: Post-menopausal, Surgical         Current Outpatient Medications   Medication Sig Dispense Refill   • butalbital-aspirin-caffeine (FIORINAL) -40 MG capsule Take 1 capsule by mouth Every 4 (Four) Hours As Needed for Headache. 60 capsule 0   • clopidogrel (PLAVIX) 75 MG tablet Take 1 tablet by mouth Daily. 90 tablet 3   • cyanocobalamin 1000 MCG/ML injection INJECT 1ML INTO THE APPROPRIATE MUSCLE AS DIRECTED BY PRESCRIBER EVERY 30 DAYS 3 mL 3   • escitalopram (LEXAPRO) 10 MG tablet Take 1 tablet by mouth Daily. 90 tablet 3   • ezetimibe-simvastatin (VYTORIN) 10-40 MG per tablet Take 1 tablet by mouth Every Night. 90 tablet 3   • fluticasone (FLONASE) 50 MCG/ACT nasal spray 2 sprays into the nostril(s) as directed by provider Daily. Administer 2 sprays in each nostril for each dose. 16 g 5   • Liraglutide (SAXENDA) 18 MG/3ML injection pen Inject 0.6mg under skin daily for week one, THEN 1.2mg daily for week two, THEN 1.8mg daily for week three, then 2.4mg daily for week four. 5 pen 3   • loratadine (Claritin) 10 MG tablet Take 1 tablet by mouth Daily for 30 days. 30 tablet 0   • metoprolol succinate XL (TOPROL-XL) 25 MG 24 hr tablet Take 1  "tablet by mouth Daily. 90 tablet 3   • naproxen (NAPROSYN) 250 MG tablet Take 1 tablet by mouth 2 (Two) Times a Day As Needed for Moderate Pain . 20 tablet 0   • nystatin (MYCOSTATIN) 001731 UNIT/GM cream      • omeprazole (priLOSEC) 20 MG capsule TAKE ONE CAPSULE BY MOUTH DAILY 90 capsule 0   • vitamin D (ERGOCALCIFEROL) 1.25 MG (05928 UT) capsule capsule Take 1 capsule by mouth Every 7 (Seven) Days. 12 capsule 3     No current facility-administered medications for this visit.         OBJECTIVE    /88   Pulse 63   Ht 154.9 cm (61\")   Wt 80.7 kg (178 lb) Comment: per patient  LMP 04/11/1989 (Within Months)   SpO2 98%   BMI 33.63 kg/m²         Review of Systems : The following systems were reviewed and no changes noted    Constitutional:  Denies recent weight loss, weight gain, fever or chills, no change in exercise tolerance     HENT:  Denies any hearing loss, epistaxis, hoarseness, or difficulty speaking.     Eyes: Wears eyeglasses or contact lenses     Respiratory:  Denies dyspnea with exertion,no cough, wheezing, or hemoptysis.     Cardiovascular: Negative for palpations, chest pain, orthopnea, PND, peripheral edema, syncope, or claudication.     Gastrointestinal:  Denies change in bowel habits, dyspepsia, ulcer disease, hematochezia, or melena.     Endocrine: Negative for cold intolerance, heat intolerance, polydipsia, polyphagia and polyuria.    Genitourinary: Negative.      Musculoskeletal: Denies any history of arthritic symptoms or back problems     Neurological:  Denies any history of recurrent headaches, strokes, TIA, or seizure disorder.     Hematological: Denies any food allergies, seasonal allergies, bleeding disorders, or lymphadenopathy.     Psychiatric/Behavioral: history of depression, no substance abuse, or change in cognitive function.        Physical Exam : The following systems were reassessed and no changes noted     Constitutional: Cooperative, alert and oriented,  in no acute " distress.      HENT:   Head: Normocephalic, normal hair patterns, no masses or tenderness.  Ears, Nose, and Throat: No gross abnormalities. No pallor or cyanosis.   Eyes: EOMS intact, PERRL, conjunctivae and lids unremarkable. Fundoscopic exam and visual fields not performed.   Neck: No palpable masses or adenopathy, no thyromegaly, no JVD, carotid pulses are full and equal bilaterally and without  Bruits.      Cardiovascular: Regular rhythm, S1 and S2 normal, no S3 or S4.  No murmurs, gallops, or rubs detected.      Pulmonary/Chest: Chest: normal symmetry, no tenderness to palpation, normal respiratory excursion, no intercostal retraction, no use of accessory muscles.                                  Pulmonary: Normal breath sounds. No rales or ronchi.     Abdominal: Abdomen soft, bowel sounds normoactive, no masses, no hepatosplenomegaly, non-tender, no bruits.     Musculoskeletal: No deformities, clubbing, cyanosis, erythema, or edema observed.     Neurological: No gross motor or sensory deficits noted, affect appropriate, oriented to time, person, place.      Skin: Warm and dry to the touch, no apparent skin lesions or masses noted.     Psychiatric: She has a normal mood and affect. Her behavior is normal. Judgment and thought content normal.        Lab Results   Component Value Date    WBC 7.30 03/09/2022    HGB 14.3 03/09/2022    HCT 44.6 03/09/2022    MCV 91.6 03/09/2022     03/09/2022     Lab Results   Component Value Date    GLUCOSE 78 03/09/2022    BUN 15 03/09/2022    CREATININE 0.78 03/09/2022    EGFRIFNONA 57 (L) 09/26/2021    BCR 19.2 03/09/2022    CO2 26.3 03/09/2022    CALCIUM 9.2 03/09/2022    ALBUMIN 4.20 03/09/2022    AST 16 03/09/2022    ALT 17 03/09/2022     Lab Results   Component Value Date    CHOL 176 03/09/2022    CHOL 136 12/02/2021    CHOL 140 11/16/2020     Lab Results   Component Value Date    TRIG 125 03/09/2022    TRIG 65 12/02/2021    TRIG 94 11/16/2020     Lab Results    Component Value Date    HDL 66 (H) 03/09/2022    HDL 65 (H) 12/02/2021    HDL 67 (H) 11/16/2020     No components found for: LDLCALC  Lab Results   Component Value Date    LDL 88 03/09/2022    LDL 58 12/02/2021    LDL 56 11/16/2020     No results found for: HDLLDLRATIO  No components found for: CHOLHDL  Lab Results   Component Value Date    HGBA1C 5.40 09/23/2019     Lab Results   Component Value Date    TSH 1.460 03/09/2022           ASSESSMENT AND PLAN  Kimberly Means is stable with no clinical evidence of progression of coronary artery disease.  Her lab results from March 2022 were reviewed and CBC, CMP were within normal limits and LDL was 88 and HDL 66.   Antiplatelet therapy with Plavix, Antihypertensive therapy with metoprolol succinate and lipid-lowering therapy with Vytorin has been continued.    Diagnoses and all orders for this visit:    1. Coronary artery disease, unspecified vessel or lesion type, unspecified whether angina present, unspecified whether native or transplanted heart (Primary)  -     ECG 12 Lead    2. Coronary artery disease involving native coronary artery of native heart without angina pectoris    3. Essential hypertension    4. Familial hypercholesterolemia        Patient's Body mass index is 33.63 kg/m². BMI is above normal parameters. Recommendations include: exercise counseling and nutrition counseling.  Patient is a non-smoker    Nallely Foster MD  6/8/2022  07:58 CDT

## 2022-06-16 ENCOUNTER — TELEPHONE (OUTPATIENT)
Dept: CARDIOLOGY | Facility: CLINIC | Age: 62
End: 2022-06-16

## 2022-06-16 DIAGNOSIS — I10 ESSENTIAL HYPERTENSION: ICD-10-CM

## 2022-06-16 RX ORDER — METOPROLOL SUCCINATE 25 MG/1
25 TABLET, EXTENDED RELEASE ORAL DAILY
Qty: 90 TABLET | Refills: 3 | Status: SHIPPED | OUTPATIENT
Start: 2022-06-16 | End: 2022-10-27

## 2022-06-20 ENCOUNTER — TELEPHONE (OUTPATIENT)
Dept: FAMILY MEDICINE CLINIC | Facility: CLINIC | Age: 62
End: 2022-06-20

## 2022-06-20 DIAGNOSIS — E66.09 CLASS 1 OBESITY DUE TO EXCESS CALORIES WITH SERIOUS COMORBIDITY AND BODY MASS INDEX (BMI) OF 33.0 TO 33.9 IN ADULT: Primary | ICD-10-CM

## 2022-06-20 RX ORDER — SEMAGLUTIDE 0.25 MG/.5ML
0.25 INJECTION, SOLUTION SUBCUTANEOUS WEEKLY
Qty: 10 ML | Refills: 3 | Status: SHIPPED | OUTPATIENT
Start: 2022-06-20 | End: 2022-06-21 | Stop reason: SDUPTHER

## 2022-06-20 NOTE — TELEPHONE ENCOUNTER
[facility] [address] [place] [facility] [address] - [phone]  - [phone] FX        Says wegovy is unavailable the last 2 mo and future release dose is not even available at this time  so do we need to change to ozempic    [facility] says that they will disregard request

## 2022-06-21 ENCOUNTER — TELEPHONE (OUTPATIENT)
Dept: FAMILY MEDICINE CLINIC | Facility: CLINIC | Age: 62
End: 2022-06-21

## 2022-06-21 DIAGNOSIS — E66.09 CLASS 1 OBESITY DUE TO EXCESS CALORIES WITH SERIOUS COMORBIDITY AND BODY MASS INDEX (BMI) OF 33.0 TO 33.9 IN ADULT: ICD-10-CM

## 2022-06-21 RX ORDER — SEMAGLUTIDE 0.25 MG/.5ML
0.25 INJECTION, SOLUTION SUBCUTANEOUS WEEKLY
Qty: 10 ML | Refills: 3 | Status: SHIPPED | OUTPATIENT
Start: 2022-06-21 | End: 2022-07-11 | Stop reason: DRUGHIGH

## 2022-06-21 NOTE — TELEPHONE ENCOUNTER
PT CALLED [facility] AND THEY STILL HAVENT RECEIVED RX FOR WEGOVY. PT IS CHECKING STATUS. CHART SHOWS RX WAS SENT 6/20

## 2022-06-22 RX ORDER — OMEPRAZOLE 20 MG/1
20 CAPSULE, DELAYED RELEASE ORAL DAILY
Qty: 90 CAPSULE | Refills: 3 | Status: SHIPPED | OUTPATIENT
Start: 2022-06-22 | End: 2022-10-27

## 2022-06-28 LAB
QT INTERVAL: 402 MS
QTC INTERVAL: 411 MS

## 2022-06-30 PROCEDURE — 87077 CULTURE AEROBIC IDENTIFY: CPT | Performed by: NURSE PRACTITIONER

## 2022-06-30 PROCEDURE — 87086 URINE CULTURE/COLONY COUNT: CPT | Performed by: NURSE PRACTITIONER

## 2022-06-30 PROCEDURE — 87186 SC STD MICRODIL/AGAR DIL: CPT | Performed by: NURSE PRACTITIONER

## 2022-07-11 DIAGNOSIS — E66.09 CLASS 1 OBESITY DUE TO EXCESS CALORIES WITH SERIOUS COMORBIDITY AND BODY MASS INDEX (BMI) OF 33.0 TO 33.9 IN ADULT: Primary | ICD-10-CM

## 2022-07-11 RX ORDER — SEMAGLUTIDE 1.7 MG/.75ML
1.7 INJECTION, SOLUTION SUBCUTANEOUS WEEKLY
Qty: 3 ML | Refills: 2 | Status: SHIPPED | OUTPATIENT
Start: 2022-07-11 | End: 2022-09-01 | Stop reason: DRUGHIGH

## 2022-07-13 ENCOUNTER — IMMUNIZATION (OUTPATIENT)
Dept: FAMILY MEDICINE CLINIC | Facility: CLINIC | Age: 62
End: 2022-07-13

## 2022-07-13 PROCEDURE — 0054A COVID-19 (PFIZER) 12+ YRS: CPT | Performed by: SURGERY

## 2022-07-13 PROCEDURE — 91305 COVID-19 (PFIZER) 12+ YRS: CPT | Performed by: SURGERY

## 2022-07-26 ENCOUNTER — OFFICE VISIT (OUTPATIENT)
Dept: FAMILY MEDICINE CLINIC | Facility: CLINIC | Age: 62
End: 2022-07-26

## 2022-07-26 ENCOUNTER — LAB (OUTPATIENT)
Dept: LAB | Facility: HOSPITAL | Age: 62
End: 2022-07-26

## 2022-07-26 VITALS
HEART RATE: 65 BPM | DIASTOLIC BLOOD PRESSURE: 68 MMHG | HEIGHT: 61 IN | WEIGHT: 176 LBS | BODY MASS INDEX: 33.23 KG/M2 | SYSTOLIC BLOOD PRESSURE: 104 MMHG | OXYGEN SATURATION: 96 %

## 2022-07-26 DIAGNOSIS — R10.9 LEFT FLANK PAIN: ICD-10-CM

## 2022-07-26 DIAGNOSIS — N39.0 RECURRENT UTI: Primary | ICD-10-CM

## 2022-07-26 PROCEDURE — 99214 OFFICE O/P EST MOD 30 MIN: CPT | Performed by: NURSE PRACTITIONER

## 2022-07-26 PROCEDURE — 81001 URINALYSIS AUTO W/SCOPE: CPT | Performed by: NURSE PRACTITIONER

## 2022-07-26 NOTE — PROGRESS NOTES
Chief Complaint  Frequent UTI     Subjective  Over the past couple of months has had recurrent urinary tract infections with left flank pain.  Most recently treated with Keflex after being seen at the [facility] center on July 24.  Urine culture at that time showed positive for Klebsiella pneumoniae.  Today in office asking for referral to urology for further evaluation.      Kimberly Means presents to [facility] [place]  Urinary Tract Infection   This is a recurrent problem. The current episode started more than 1 month ago. The problem has been waxing and waning. The quality of the pain is described as burning. The pain is moderate. There has been no fever. Associated symptoms include flank pain, frequency, hematuria and urgency. She has tried antibiotics and NSAIDs for the symptoms. The treatment provided moderate relief.   Flank Pain  This is a recurrent problem. The current episode started more than 1 month ago. The problem occurs intermittently. The problem has been waxing and waning since onset. The quality of the pain is described as aching. The pain does not radiate. The pain is moderate. Pertinent negatives include no bladder incontinence or bowel incontinence. She has tried NSAIDs for the symptoms. The treatment provided mild relief.     Current Outpatient Medications on File Prior to Visit   Medication Sig Dispense Refill   • butalbital-aspirin-caffeine (FIORINAL) -40 MG capsule Take 1 capsule by mouth Every 4 (Four) Hours As Needed for Headache. 60 capsule 0   • cephalexin (KEFLEX) 500 MG capsule Take 1 capsule by mouth 3 (Three) Times a Day for 10 days. 30 capsule 0   • clopidogrel (PLAVIX) 75 MG tablet Take 1 tablet by mouth Daily. 90 tablet 3   • cyanocobalamin 1000 MCG/ML injection INJECT 1ML INTO THE APPROPRIATE MUSCLE AS DIRECTED BY PRESCRIBER EVERY 30 DAYS 3 mL 3   • escitalopram (LEXAPRO) 10 MG tablet Take 1 tablet by mouth Daily. 90 tablet 3   •  "ezetimibe-simvastatin (VYTORIN) 10-40 MG per tablet Take 1 tablet by mouth Every Night. 90 tablet 3   • fluticasone (FLONASE) 50 MCG/ACT nasal spray 2 sprays into the nostril(s) as directed by provider Daily. Administer 2 sprays in each nostril for each dose. 16 g 5   • metoprolol succinate XL (TOPROL-XL) 25 MG 24 hr tablet Take 1 tablet by mouth Daily. 90 tablet 3   • naproxen (NAPROSYN) 250 MG tablet Take 1 tablet by mouth 2 (Two) Times a Day As Needed for Moderate Pain . 20 tablet 0   • nystatin (MYCOSTATIN) 804260 UNIT/GM cream      • omeprazole (priLOSEC) 20 MG capsule Take 1 capsule by mouth Daily. 90 capsule 3   • Semaglutide-Weight Management (Wegovy) 1.7 MG/0.75ML solution auto-injector Inject 1.7 mg (1 pen) under the skin into the appropriate area as directed 1 (One) Time Per Week. 3 mL 2   • vitamin D (ERGOCALCIFEROL) 1.25 MG (94580 UT) capsule capsule Take 1 capsule by mouth Every 7 (Seven) Days. 12 capsule 3   • loratadine (Claritin) 10 MG tablet Take 1 tablet by mouth Daily for 30 days. 30 tablet 0   • [DISCONTINUED] fluconazole (DIFLUCAN) 150 MG tablet Take 1 tablet by mouth Daily. Take one tablet by mouth. Repeat in 3 days if needed. 2 tablet 2     No current facility-administered medications on file prior to visit.     Allergies   Allergen Reactions   • Kiwi Extract Anaphylaxis   • Lortab [Hydrocodone-Acetaminophen] Confusion and Hallucinations   • Paxil [Paroxetine] Mental Status Change     \"MESSES WITH MY MIND\"   • Dilaudid [Hydromorphone] Unknown - High Severity     Rapid heart rate     • Morphine And Related Itching   • Sulfa Antibiotics Hives and Rash       Objective   Vital Signs:  /68   Pulse 65   Ht 154.9 cm (61\")   Wt 79.8 kg (176 lb)   SpO2 96%   BMI 33.25 kg/m²   Estimated body mass index is 33.25 kg/m² as calculated from the following:    Height as of this encounter: 154.9 cm (61\").    Weight as of this encounter: 79.8 kg (176 lb).      Physical Exam  Vitals and nursing note " reviewed.   Constitutional:       Appearance: She is well-developed.   Cardiovascular:      Rate and Rhythm: Normal rate and regular rhythm.      Heart sounds: Normal heart sounds.   Pulmonary:      Effort: Pulmonary effort is normal.      Breath sounds: Normal breath sounds.   Abdominal:      General: Bowel sounds are normal.      Palpations: Abdomen is soft.      Tenderness: There is left CVA tenderness.   Musculoskeletal:         General: Normal range of motion.   Skin:     General: Skin is warm.      Capillary Refill: Capillary refill takes less than 2 seconds.   Neurological:      Mental Status: She is alert and oriented to person, place, and time.   Psychiatric:         Behavior: Behavior normal.        Result Review :  The following data was reviewed by: NICOLLE Gomez on 07/26/2022:  UA    Urinalysis 3/7/22 3/9/22 3/9/22 6/30/22     1544 1544    Squamous Epithelial Cells, UA   3-6 (A)    Specific Gravity, UA  1.028     Ketones, UA Trace (A) Trace (A)  Trace (A)   Blood, UA  Negative     Leukocytes, UA Moderate (2+) (A) Moderate (2+) (A)  Moderate (2+) (A)   Nitrite, UA  Positive (A)     RBC, UA   None Seen    WBC, UA   6-12 (A)    Bacteria, UA   2+ (A)    (A) Abnormal value            Urine Culture    Urine Culture 3/9/22 6/30/22   Urine Culture <25,000 CFU/mL Mixed Brandi Isolated >100,000 CFU/mL Klebsiella pneumoniae ssp pneumoniae (A)   (A) Abnormal value                      Assessment and Plan   Diagnoses and all orders for this visit:    1. Recurrent UTI (Primary)  -     Urinalysis With Culture If Indicated -; Future  -     Ambulatory Referral to Urology  -     Urinalysis With Culture If Indicated -    2. Left flank pain  -     XR Abdomen KUB; Future    1.  Recurrent UTI:  Urinalysis collected and sent to lab and will notify of results when available  Referral placed to urology will contact patient schedule appointment  Discussed importance of increasing water intake to help promote hydration  elimination    2.  Left flank pain:  Complete KUB x-ray as ordered and will notify of results when available  Seek emergency medical treatment for any new or worsening flank pain, blood in urine       Follow Up   Return if symptoms worsen or fail to improve, for Next scheduled follow up.  Patient was given instructions and counseling regarding her condition or for health maintenance advice. Please see specific information pulled into the AVS if appropriate.         This document has been electronically signed by NICOLLE Gomez on July 26, 2022 14:31 CDT

## 2022-07-27 ENCOUNTER — TELEPHONE (OUTPATIENT)
Dept: FAMILY MEDICINE CLINIC | Facility: CLINIC | Age: 62
End: 2022-07-27

## 2022-07-27 LAB
BACTERIA UR QL AUTO: ABNORMAL /HPF
BILIRUB UR QL STRIP: NEGATIVE
CLARITY UR: ABNORMAL
COLOR UR: YELLOW
GLUCOSE UR STRIP-MCNC: NEGATIVE MG/DL
HGB UR QL STRIP.AUTO: NEGATIVE
HYALINE CASTS UR QL AUTO: ABNORMAL /LPF
KETONES UR QL STRIP: ABNORMAL
LEUKOCYTE ESTERASE UR QL STRIP.AUTO: ABNORMAL
NITRITE UR QL STRIP: NEGATIVE
PH UR STRIP.AUTO: 5.5 [PH] (ref 5–8)
PROT UR QL STRIP: NEGATIVE
RBC # UR STRIP: ABNORMAL /HPF
REF LAB TEST METHOD: ABNORMAL
SP GR UR STRIP: 1.03 (ref 1–1.03)
SQUAMOUS #/AREA URNS HPF: ABNORMAL /HPF
UROBILINOGEN UR QL STRIP: ABNORMAL
WBC # UR STRIP: ABNORMAL /HPF

## 2022-07-27 NOTE — TELEPHONE ENCOUNTER
Per NICOLLE Salgado, Ms. Means has been called with recent lab results & recommendations.  Continue current medications and follow-up as planned or sooner if any problems.       ----- Message from NICOLLE Gomez sent at 7/27/2022  6:19 AM CDT -----  Urinalysis did show 1+ bacteria and a trace of ketones.  Appearance of the urine itself was turbid in color.  She needs to drink more water.  Nitrites were negative which means no UTI.

## 2022-07-28 ENCOUNTER — TELEPHONE (OUTPATIENT)
Dept: FAMILY MEDICINE CLINIC | Facility: CLINIC | Age: 62
End: 2022-07-28

## 2022-07-28 NOTE — TELEPHONE ENCOUNTER
Per NICOLLE Salgado, Ms. Means has been called with recent KUB x-ray results & recommendations.  Continue current medications and follow-up as planned or sooner if any problems.       ----- Message from NICOLLE Gomez sent at 7/28/2022  4:24 PM CDT -----  X-ray showed mildly increased stool burden which is suggestive of constipation but otherwise normal exam.  She is increase water intake and fiber intake to help promote elimination.  She can add MiraLAX daily to her morning routine.

## 2022-08-11 ENCOUNTER — LAB (OUTPATIENT)
Dept: LAB | Facility: HOSPITAL | Age: 62
End: 2022-08-11

## 2022-08-11 ENCOUNTER — OFFICE VISIT (OUTPATIENT)
Dept: FAMILY MEDICINE CLINIC | Facility: CLINIC | Age: 62
End: 2022-08-11

## 2022-08-11 VITALS
DIASTOLIC BLOOD PRESSURE: 66 MMHG | BODY MASS INDEX: 33.42 KG/M2 | SYSTOLIC BLOOD PRESSURE: 124 MMHG | WEIGHT: 177 LBS | HEIGHT: 61 IN | HEART RATE: 77 BPM | OXYGEN SATURATION: 96 %

## 2022-08-11 DIAGNOSIS — E78.2 MIXED HYPERLIPIDEMIA: ICD-10-CM

## 2022-08-11 DIAGNOSIS — I10 ESSENTIAL HYPERTENSION: ICD-10-CM

## 2022-08-11 DIAGNOSIS — F32.A DEPRESSIVE DISORDER: ICD-10-CM

## 2022-08-11 DIAGNOSIS — I10 ESSENTIAL HYPERTENSION: Primary | ICD-10-CM

## 2022-08-11 DIAGNOSIS — E66.09 CLASS 1 OBESITY DUE TO EXCESS CALORIES WITH SERIOUS COMORBIDITY AND BODY MASS INDEX (BMI) OF 33.0 TO 33.9 IN ADULT: ICD-10-CM

## 2022-08-11 LAB
ALBUMIN SERPL-MCNC: 4.3 G/DL (ref 3.5–5.2)
ALBUMIN/GLOB SERPL: 1.9 G/DL
ALP SERPL-CCNC: 65 U/L (ref 39–117)
ALT SERPL W P-5'-P-CCNC: 19 U/L (ref 1–33)
ANION GAP SERPL CALCULATED.3IONS-SCNC: 10 MMOL/L (ref 5–15)
AST SERPL-CCNC: 18 U/L (ref 1–32)
BASOPHILS # BLD AUTO: 0.06 10*3/MM3 (ref 0–0.2)
BASOPHILS NFR BLD AUTO: 0.8 % (ref 0–1.5)
BILIRUB SERPL-MCNC: 0.4 MG/DL (ref 0–1.2)
BUN SERPL-MCNC: 11 MG/DL (ref 8–23)
BUN/CREAT SERPL: 16.9 (ref 7–25)
CALCIUM SPEC-SCNC: 9.4 MG/DL (ref 8.6–10.5)
CHLORIDE SERPL-SCNC: 105 MMOL/L (ref 98–107)
CHOLEST SERPL-MCNC: 156 MG/DL (ref 0–200)
CO2 SERPL-SCNC: 25 MMOL/L (ref 22–29)
CREAT SERPL-MCNC: 0.65 MG/DL (ref 0.57–1)
DEPRECATED RDW RBC AUTO: 42.3 FL (ref 37–54)
EGFRCR SERPLBLD CKD-EPI 2021: 98.5 ML/MIN/1.73
EOSINOPHIL # BLD AUTO: 0.16 10*3/MM3 (ref 0–0.4)
EOSINOPHIL NFR BLD AUTO: 2.2 % (ref 0.3–6.2)
ERYTHROCYTE [DISTWIDTH] IN BLOOD BY AUTOMATED COUNT: 12.8 % (ref 12.3–15.4)
GLOBULIN UR ELPH-MCNC: 2.3 GM/DL
GLUCOSE SERPL-MCNC: 74 MG/DL (ref 65–99)
HCT VFR BLD AUTO: 43.4 % (ref 34–46.6)
HDLC SERPL-MCNC: 65 MG/DL (ref 40–60)
HGB BLD-MCNC: 14.2 G/DL (ref 12–15.9)
IMM GRANULOCYTES # BLD AUTO: 0.01 10*3/MM3 (ref 0–0.05)
IMM GRANULOCYTES NFR BLD AUTO: 0.1 % (ref 0–0.5)
LDLC SERPL CALC-MCNC: 72 MG/DL (ref 0–100)
LDLC/HDLC SERPL: 1.07 {RATIO}
LYMPHOCYTES # BLD AUTO: 2.27 10*3/MM3 (ref 0.7–3.1)
LYMPHOCYTES NFR BLD AUTO: 31.1 % (ref 19.6–45.3)
MCH RBC QN AUTO: 30 PG (ref 26.6–33)
MCHC RBC AUTO-ENTMCNC: 32.7 G/DL (ref 31.5–35.7)
MCV RBC AUTO: 91.6 FL (ref 79–97)
MONOCYTES # BLD AUTO: 0.55 10*3/MM3 (ref 0.1–0.9)
MONOCYTES NFR BLD AUTO: 7.5 % (ref 5–12)
NEUTROPHILS NFR BLD AUTO: 4.25 10*3/MM3 (ref 1.7–7)
NEUTROPHILS NFR BLD AUTO: 58.3 % (ref 42.7–76)
NRBC BLD AUTO-RTO: 0 /100 WBC (ref 0–0.2)
PLATELET # BLD AUTO: 257 10*3/MM3 (ref 140–450)
PMV BLD AUTO: 9.6 FL (ref 6–12)
POTASSIUM SERPL-SCNC: 4.2 MMOL/L (ref 3.5–5.2)
PROT SERPL-MCNC: 6.6 G/DL (ref 6–8.5)
RBC # BLD AUTO: 4.74 10*6/MM3 (ref 3.77–5.28)
SODIUM SERPL-SCNC: 140 MMOL/L (ref 136–145)
TRIGL SERPL-MCNC: 107 MG/DL (ref 0–150)
VLDLC SERPL-MCNC: 19 MG/DL (ref 5–40)
WBC NRBC COR # BLD: 7.3 10*3/MM3 (ref 3.4–10.8)

## 2022-08-11 PROCEDURE — 99213 OFFICE O/P EST LOW 20 MIN: CPT | Performed by: NURSE PRACTITIONER

## 2022-08-11 PROCEDURE — 85025 COMPLETE CBC W/AUTO DIFF WBC: CPT

## 2022-08-11 PROCEDURE — 36415 COLL VENOUS BLD VENIPUNCTURE: CPT | Performed by: NURSE PRACTITIONER

## 2022-08-11 PROCEDURE — 80061 LIPID PANEL: CPT

## 2022-08-11 PROCEDURE — 80053 COMPREHEN METABOLIC PANEL: CPT | Performed by: NURSE PRACTITIONER

## 2022-08-11 NOTE — PROGRESS NOTES
"Chief Complaint  Hypertension (6 mo f/u ), Hyperlipidemia, Depression, and Obesity    Subjective   Six month follow-up for HTN, hyperlipidemia, depression and obesity.  Recently begin Wegovy for weight loss.  \"It has been working really well.\"        Kimberly Means presents to [facility] [place]  Hypertension  This is a chronic problem. The current episode started more than 1 year ago. The problem is unchanged. The problem is controlled. Associated symptoms include blurred vision. Pertinent negatives include no shortness of breath. There are no associated agents to hypertension. Risk factors for coronary artery disease include dyslipidemia, family history, post-menopausal state, stress and obesity. Past treatments include beta blockers. Current antihypertension treatment includes beta blockers. The current treatment provides significant improvement. There are no compliance problems.  Hypertensive end-organ damage includes CAD/MI.   Hyperlipidemia  This is a chronic problem. The current episode started more than 1 year ago. The problem is controlled. Recent lipid tests were reviewed and are high. Exacerbating diseases include obesity. Factors aggravating her hyperlipidemia include beta blockers and fatty foods. Pertinent negatives include no shortness of breath. Current antihyperlipidemic treatment includes statins and ezetimibe. The current treatment provides moderate improvement of lipids. There are no compliance problems.  Risk factors for coronary artery disease include dyslipidemia, family history, hypertension, obesity and post-menopausal.   Depression  Visit Type: follow-up  Patient presents with the following symptoms: depressed mood.  Patient is not experiencing: confusion, shortness of breath, suicidal ideas and thoughts of death.  Frequency of symptoms: occasionally   Severity: mild   Sleep quality: good  Nighttime awakenings: occasional  Compliance with " medications:  %        Obesity  This is a chronic problem. The current episode started more than 1 year ago. The problem occurs constantly. The problem has been unchanged. Associated symptoms include nausea and vomiting. Pertinent negatives include no chills, coughing, diaphoresis, fatigue or fever. The symptoms are aggravated by drinking and eating. Treatments tried: gastric sleeve and Wegovy  The treatment provided mild relief.     Current Outpatient Medications on File Prior to Visit   Medication Sig Dispense Refill   • butalbital-aspirin-caffeine (FIORINAL) -40 MG capsule Take 1 capsule by mouth Every 4 (Four) Hours As Needed for Headache. 60 capsule 0   • clopidogrel (PLAVIX) 75 MG tablet Take 1 tablet by mouth Daily. 90 tablet 3   • cyanocobalamin 1000 MCG/ML injection INJECT 1ML INTO THE APPROPRIATE MUSCLE AS DIRECTED BY PRESCRIBER EVERY 30 DAYS 3 mL 3   • escitalopram (LEXAPRO) 10 MG tablet Take 1 tablet by mouth Daily. 90 tablet 3   • ezetimibe-simvastatin (VYTORIN) 10-40 MG per tablet Take 1 tablet by mouth Every Night. 90 tablet 3   • fluticasone (FLONASE) 50 MCG/ACT nasal spray 2 sprays into the nostril(s) as directed by provider Daily. Administer 2 sprays in each nostril for each dose. 16 g 5   • metoprolol succinate XL (TOPROL-XL) 25 MG 24 hr tablet Take 1 tablet by mouth Daily. 90 tablet 3   • naproxen (NAPROSYN) 250 MG tablet Take 1 tablet by mouth 2 (Two) Times a Day As Needed for Moderate Pain . 20 tablet 0   • nystatin (MYCOSTATIN) 310476 UNIT/GM cream      • omeprazole (priLOSEC) 20 MG capsule Take 1 capsule by mouth Daily. 90 capsule 3   • Semaglutide-Weight Management (Wegovy) 1.7 MG/0.75ML solution auto-injector Inject 1.7 mg (1 pen) under the skin into the appropriate area as directed 1 (One) Time Per Week. 3 mL 2   • vitamin D (ERGOCALCIFEROL) 1.25 MG (41485 UT) capsule capsule Take 1 capsule by mouth Every 7 (Seven) Days. 12 capsule 3   • [DISCONTINUED] loratadine (Claritin) 10  "MG tablet Take 1 tablet by mouth Daily for 30 days. 30 tablet 0     No current facility-administered medications on file prior to visit.     Allergies   Allergen Reactions   • Kiwi Extract Anaphylaxis   • Lortab [Hydrocodone-Acetaminophen] Confusion and Hallucinations   • Paxil [Paroxetine] Mental Status Change     \"MESSES WITH MY MIND\"   • Dilaudid [Hydromorphone] Unknown - High Severity     Rapid heart rate     • Morphine And Related Itching   • Sulfa Antibiotics Hives and Rash       Objective   Vital Signs:  /66   Pulse 77   Ht 154.9 cm (61\")   Wt 80.3 kg (177 lb)   SpO2 96%   BMI 33.44 kg/m²   Estimated body mass index is 33.44 kg/m² as calculated from the following:    Height as of this encounter: 154.9 cm (61\").    Weight as of this encounter: 80.3 kg (177 lb).      Physical Exam  Vitals and nursing note reviewed.   Constitutional:       Appearance: Normal appearance. She is well-developed. She is obese.   HENT:      Head: Normocephalic.      Right Ear: External ear normal.      Left Ear: External ear normal.   Cardiovascular:      Rate and Rhythm: Normal rate and regular rhythm.      Heart sounds: Normal heart sounds.   Pulmonary:      Effort: Pulmonary effort is normal.      Breath sounds: Normal breath sounds.   Abdominal:      General: Bowel sounds are normal.      Palpations: Abdomen is soft.   Musculoskeletal:         General: Normal range of motion.      Cervical back: Normal range of motion and neck supple.   Skin:     General: Skin is warm.      Capillary Refill: Capillary refill takes less than 2 seconds.   Neurological:      Mental Status: She is alert and oriented to person, place, and time.   Psychiatric:         Behavior: Behavior normal.        Result Review :  The following data was reviewed by: NICOLLE Gomez on 08/11/2022:  Common labs    Common Labsle 9/26/21 9/26/21 12/2/21 3/9/22 3/9/22 3/9/22    1525 1525  0751 0751 0751   Glucose  105 (A)   78    BUN  14   15    Creatinine "  0.99   0.78    eGFR Non African Am  57 (A)       Sodium  142   140    Potassium  3.8   4.1    Chloride  107   104    Calcium  8.8   9.2    Albumin  4.00   4.20    Total Bilirubin  0.3   0.5    Alkaline Phosphatase  80   76    AST (SGOT)  17   16    ALT (SGPT)  13   17    WBC 6.28   7.30     Hemoglobin 13.8   14.3     Hematocrit 43.0   44.6     Platelets 226   236     Total Cholesterol   136   176   Triglycerides   65   125   HDL Cholesterol   65 (A)   66 (A)   LDL Cholesterol    58   88   (A) Abnormal value       Comments are available for some flowsheets but are not being displayed.                     Assessment and Plan   Diagnoses and all orders for this visit:    1. Essential hypertension (Primary)  -     CBC & Differential; Future  -     Comprehensive Metabolic Panel    2. Mixed hyperlipidemia  -     Lipid Panel; Future    3. Depressive disorder    4. Class 1 obesity due to excess calories with serious comorbidity and body mass index (BMI) of 33.0 to 33.9 in adult    1.  Essential hypertension:  Normotensive  Complete CBC and chemistry panel as ordered and will notify of results when available  Continue low-sodium diet  Continue metoprolol succinate  Target systolic BP to remain below 130    2.  Mixed hyperlipidemia:  Complete fasting lipid panel as ordered and will notify results when available  Continue low-fat diet    3.  Depressive disorder:  Well-controlled  Continue Lexapro as previously prescribed    4.  Class I obesity due to excess calories with serious comorbidity and a body mass index of 33.0-33.9 in adult:  Body mass index is 33.44 kg/m².  BMI is >= 30 and <35. (Class 1 Obesity). The following options were offered after discussion;: exercise counseling/recommendations, nutrition counseling/recommendations and pharmacological intervention options   Continue Wegovy as previously prescribed       I spent 22 minutes caring for Kimberly on this date of service. This time includes time spent by me in the  following activities:preparing for the visit, reviewing tests, obtaining and/or reviewing a separately obtained history, performing a medically appropriate examination and/or evaluation , counseling and educating the patient/family/caregiver, ordering medications, tests, or procedures and documenting information in the medical record  Follow Up   Return in about 6 months (around 2/11/2023) for Annual physical.  Patient was given instructions and counseling regarding her condition or for health maintenance advice. Please see specific information pulled into the AVS if appropriate.         This document has been electronically signed by NICOLLE Gomez on August 11, 2022 09:02 CDT

## 2022-08-12 ENCOUNTER — TELEPHONE (OUTPATIENT)
Dept: FAMILY MEDICINE CLINIC | Facility: CLINIC | Age: 62
End: 2022-08-12

## 2022-08-12 NOTE — TELEPHONE ENCOUNTER
Per NICOLLE Salgado, Ms. Means  has been called with recent lab results & recommendations.  Continue current medications and follow-up as planned or sooner if any problems.       ----- Message from NICOLLE Gomez sent at 8/12/2022  6:26 AM CDT -----  Good cholesterol remains slightly elevated but stable.  Make sure she is adhering to a low-fat diet and walking.  All other labs were essentially normal.

## 2022-08-12 NOTE — TELEPHONE ENCOUNTER
Per NICOLLE Salgado, Ms. Means  has been called with recent lab results & recommendations.  Continue current medications and follow-up as planned or sooner if any problems.       ----- Message from NICOLLE Gomez sent at 8/12/2022  6:26 AM CDT -----  Labs normal, please call or send card!

## 2022-09-01 ENCOUNTER — TELEPHONE (OUTPATIENT)
Dept: FAMILY MEDICINE CLINIC | Facility: CLINIC | Age: 62
End: 2022-09-01

## 2022-09-01 DIAGNOSIS — E66.09 CLASS 1 OBESITY DUE TO EXCESS CALORIES WITH SERIOUS COMORBIDITY AND BODY MASS INDEX (BMI) OF 33.0 TO 33.9 IN ADULT: Primary | ICD-10-CM

## 2022-09-01 NOTE — TELEPHONE ENCOUNTER
Kimberly said, you told her to let you know when she wanted to raise her dosage on the Wegovy and she is ready.  Asking, that you please call this to [facility] please.

## 2022-10-03 ENCOUNTER — TELEPHONE (OUTPATIENT)
Dept: FAMILY MEDICINE CLINIC | Facility: CLINIC | Age: 62
End: 2022-10-03

## 2022-10-03 DIAGNOSIS — R10.9 ABDOMINAL PAIN, UNSPECIFIED ABDOMINAL LOCATION: Primary | ICD-10-CM

## 2022-10-03 NOTE — TELEPHONE ENCOUNTER
Kimberly is asking for a referral to Dr. Villegas.  Said, she has a horrible belly ache in one spot and it really hurts when she eats.

## 2022-10-10 ENCOUNTER — OFFICE VISIT (OUTPATIENT)
Dept: GASTROENTEROLOGY | Facility: CLINIC | Age: 62
End: 2022-10-10

## 2022-10-10 VITALS
WEIGHT: 176 LBS | HEART RATE: 66 BPM | SYSTOLIC BLOOD PRESSURE: 115 MMHG | BODY MASS INDEX: 33.23 KG/M2 | HEIGHT: 61 IN | DIASTOLIC BLOOD PRESSURE: 80 MMHG

## 2022-10-10 DIAGNOSIS — R11.0 NAUSEA: ICD-10-CM

## 2022-10-10 DIAGNOSIS — R19.7 DIARRHEA, UNSPECIFIED TYPE: ICD-10-CM

## 2022-10-10 DIAGNOSIS — R10.12 LEFT UPPER QUADRANT ABDOMINAL PAIN: Primary | ICD-10-CM

## 2022-10-10 DIAGNOSIS — K62.5 HEMORRHAGE OF ANUS AND RECTUM: ICD-10-CM

## 2022-10-10 DIAGNOSIS — K59.09 OTHER CONSTIPATION: ICD-10-CM

## 2022-10-10 PROCEDURE — 99204 OFFICE O/P NEW MOD 45 MIN: CPT | Performed by: INTERNAL MEDICINE

## 2022-10-10 RX ORDER — DICYCLOMINE HCL 20 MG
20 TABLET ORAL EVERY 6 HOURS
Qty: 120 TABLET | Refills: 5 | Status: SHIPPED | OUTPATIENT
Start: 2022-10-10 | End: 2022-11-09

## 2022-10-10 RX ORDER — DEXTROSE AND SODIUM CHLORIDE 5; .45 G/100ML; G/100ML
30 INJECTION, SOLUTION INTRAVENOUS CONTINUOUS PRN
Status: CANCELLED | OUTPATIENT
Start: 2022-11-01

## 2022-10-27 DIAGNOSIS — E66.09 CLASS 1 OBESITY DUE TO EXCESS CALORIES WITH SERIOUS COMORBIDITY AND BODY MASS INDEX (BMI) OF 33.0 TO 33.9 IN ADULT: ICD-10-CM

## 2022-10-27 RX ORDER — METOPROLOL SUCCINATE 25 MG/1
25 TABLET, EXTENDED RELEASE ORAL NIGHTLY
COMMUNITY

## 2022-10-27 RX ORDER — ESCITALOPRAM OXALATE 10 MG/1
10 TABLET ORAL NIGHTLY
COMMUNITY
End: 2023-03-27 | Stop reason: SDUPTHER

## 2022-10-27 RX ORDER — OMEPRAZOLE 20 MG/1
20 CAPSULE, DELAYED RELEASE ORAL NIGHTLY
COMMUNITY

## 2022-10-30 NOTE — PROGRESS NOTES
[facility], operated by [facility]      Chief Complaint:   Chief Complaint   Patient presents with   • Abdominal Pain       Subjective     HPI:   Ms. Means is a 62-year-old  female with past medical history of gastric sleeve surgery 8 years ago, bronchitis, otitis media, otitis externa, astigmatism, bacterial pneumonia, contact dermatitis, coronary arteriosclerosis, depression, hypertension, epistaxis presented for evaluation for abdominal pain.  She has intermittent bouts of left upper quadrant pain associated with constipation alternating with diarrhea and nausea for past month.  She also has intermittent bouts of rectal bleeding and small to moderate amounts and denied vomiting or weight loss.  Denied NSAID usage.  Taking Prilosec daily without much help.    Past Medical History:   Past Medical History:   Diagnosis Date   • Abdominal pain    • Acute bronchitis    • Acute left otitis media    • Acute otitis externa    • Allergic rhinitis     Due to pollen   • Ankle pain    • Asthma    • Astigmatism    • Bacterial pneumonia 12/02/2015   • Contact dermatitis    • Coronary arteriosclerosis     stent to LAD 2006   .  followed by cassandra   • Cough    • Depressive disorder     pt states she has not suffered from depression, takes lexapro for anxiety   • Diarrhea    • Dysuria    • Encounter for Papanicolaou smear of cervix 06/01/2009   • Epigastric pain    • Epistaxis    • Essential hypertension    • Fatigue    • Fever    • Functional diarrhea    • Gastroenteritis    • Generalized anxiety disorder 01/2015    given samples of lexapro.   • GERD (gastroesophageal reflux disease)    • Heel pain    • Hematochezia    • Hernia of abdominal wall 05/13/2016    incarcerated- primarily repaired. Now recurrent      • History of bone density study 05/27/2014   • History of colonoscopy     diagnostic (screening) 50910- Normal colonoscopy.;  Last Performed: 12/10/2015   • History of esophagogastroduodenoscopy     epigastric  pain, gastritis without bleeding,gastroesophageal reflux without esophagitis;  Last Performed: 01/06/1999   • History of mammogram 06/29/2016   • History of screening mammography    • Hyperglycemia    • Hyperlipidemia    • Impaired glucose tolerance    • Irritable bowel syndrome    • Leaky heart valve    • Measles    • Mechanical complication of internal orthopedic device ([facility])    • Migraine    • Morbid obesity due to excess calories ([facility])    • Mumps    • Myopia    • Nausea    • Need for DTaP vaccine 01/17/2015   • Retinal tear    • Spasm of back muscles    • Sprain of ankle    • Umbilical hernia    • Upper respiratory infection    • Urinary tract infectious disease    • Ventral hernia     Open ventral hernioplasty. Incarcerated ventral hernia;  Last Performed: 07/17/2012   • Vitamin D deficiency    • Vitreous detachment    • Vitreous opacities        Past Surgical History:  Past Surgical History:   Procedure Laterality Date   • CERVICAL CONIZATION      stress urinary incontinence,endocervical dysplasia;  Last Performed: 09/05/1989   • CHOLECYSTECTOMY  08/26/1993    Laparoscopic   • COLONOSCOPY N/A 02/17/2017    Procedure: COLONOSCOPY;  Surgeon: Young Villegas MD;  Location: [facility] ENDOSCOPY;  Service:    • CORONARY ANGIOPLASTY      stent to LAD;  Last Performed: 2006   • EPIGASTRIC HERNIA REPAIR  02/02/1973   • GASTRIC SLEEVE LAPAROSCOPIC  11/08/2016   • HYSTERECTOMY  09/05/1989    mmk urethropexy   • ORIF ANKLE FRACTURE Left    • TONSILLECTOMY      postoperative tonsillectomy,transfixed bleeding vessel, right tonsillar fossa;  Last Performed: 01/05/1974   • VAGINAL DELIVERY      x 2       Family History:  Family History   Problem Relation Age of Onset   • Thyroid disease Mother    • Diabetes type II Mother    • Long QT syndrome Mother    • Hypothyroidism Mother    • Breast cancer Mother    • Thyroid disease Father    • Coronary artery disease Father    • Tremor Father    • Diabetes Father    • COPD Father    •  Colon cancer Father    • Coronary artery disease Other    • Diabetes Other    • Hypertension Other        Social History:   reports that she has never smoked. She has never used smokeless tobacco. She reports that she does not drink alcohol and does not use drugs.    Medications:   Prior to Admission medications    Medication Sig Start Date End Date Taking? Authorizing Provider   butalbital-aspirin-caffeine (FIORINAL) -40 MG capsule Take 1 capsule by mouth Every 4 (Four) Hours As Needed for Headache. 2/4/21  Yes Naomi Ross APRN   clopidogrel (PLAVIX) 75 MG tablet Take 1 tablet by mouth Daily. 5/5/22  Yes Naomi Ross APRN   cyanocobalamin 1000 MCG/ML injection INJECT 1ML INTO THE APPROPRIATE MUSCLE AS DIRECTED BY PRESCRIBER EVERY 30 DAYS 11/23/21  Yes Naomi Ross APRN   ezetimibe-simvastatin (VYTORIN) 10-40 MG per tablet Take 1 tablet by mouth Every Night. 3/22/22  Yes Nallely Foster MD   fluticasone (FLONASE) 50 MCG/ACT nasal spray 2 sprays into the nostril(s) as directed by provider Daily. Administer 2 sprays in each nostril for each dose. 2/4/21  Yes Naomi Ross APRN   nystatin (MYCOSTATIN) 870267 UNIT/GM cream Apply 1 application topically to the appropriate area as directed As Needed. 10/22/20  Yes Nicole Farrell MD   vitamin D (ERGOCALCIFEROL) 1.25 MG (15308 UT) capsule capsule Take 1 capsule by mouth Every 7 (Seven) Days. 3/17/22  Yes Naomi Ross APRN   dicyclomine (BENTYL) 20 MG tablet Take 1 tablet by mouth Every 6 (Six) Hours for 30 days. 10/10/22 11/9/22  Susi Power MD   escitalopram (LEXAPRO) 10 MG tablet Take 1 tablet by mouth Every Night.    Nicole Farrell MD   metoprolol succinate XL (TOPROL-XL) 25 MG 24 hr tablet Take 1 tablet by mouth Every Night.    Nicole Farrell MD   omeprazole (priLOSEC) 20 MG capsule Take 1 capsule by mouth Every Night.    Nicole Farrell MD   Semaglutide-Weight Management 2.4 MG/0.75ML solution auto-injector  "Inject 2.4 mg under the skin into the appropriate area as directed 1 (One) Time Per Week. 10/27/22   Naomi Ross APRN       Allergies:  Kiwi extract, Lortab [hydrocodone-acetaminophen], Paxil [paroxetine], Dilaudid [hydromorphone], Morphine and related, and Sulfa antibiotics    ROS:    Review of Systems   Constitutional: Negative for chills, fatigue, fever and unexpected weight change.   HENT: Negative for congestion, ear discharge, hearing loss, nosebleeds and sore throat.    Eyes: Negative for pain, discharge and redness.   Respiratory: Negative for cough, chest tightness, shortness of breath and wheezing.    Cardiovascular: Negative for chest pain and palpitations.   Gastrointestinal: Positive for abdominal pain, constipation, diarrhea and nausea. Negative for abdominal distention, blood in stool and vomiting.   Endocrine: Negative for cold intolerance, polydipsia, polyphagia and polyuria.   Genitourinary: Negative for dysuria, flank pain, frequency, hematuria and urgency.   Musculoskeletal: Negative for arthralgias, back pain, joint swelling and myalgias.   Skin: Negative for color change, pallor and rash.   Neurological: Negative for tremors, seizures, syncope, weakness and headaches.   Hematological: Negative for adenopathy. Does not bruise/bleed easily.   Psychiatric/Behavioral: Negative for behavioral problems, confusion, dysphoric mood, hallucinations and suicidal ideas. The patient is not nervous/anxious.      Objective     /80 (BP Location: Right arm)   Pulse 66   Ht 154.9 cm (61\")   Wt 79.8 kg (176 lb)   LMP 04/11/1989 (Within Months)   BMI 33.25 kg/m²     Physical Exam  Constitutional:       Appearance: She is well-developed.   HENT:      Head: Normocephalic and atraumatic.   Eyes:      Conjunctiva/sclera: Conjunctivae normal.      Pupils: Pupils are equal, round, and reactive to light.   Neck:      Thyroid: No thyromegaly.   Cardiovascular:      Rate and Rhythm: Normal rate and regular " rhythm.      Heart sounds: Normal heart sounds. No murmur heard.  Pulmonary:      Effort: Pulmonary effort is normal.      Breath sounds: Normal breath sounds. No wheezing.   Abdominal:      General: Bowel sounds are normal. There is no distension.      Palpations: Abdomen is soft. There is no mass.      Tenderness: There is no abdominal tenderness.      Hernia: No hernia is present.   Genitourinary:     Comments: No lesions noted  Musculoskeletal:         General: No tenderness. Normal range of motion.      Cervical back: Normal range of motion and neck supple.   Lymphadenopathy:      Cervical: No cervical adenopathy.   Skin:     General: Skin is warm and dry.      Findings: No rash.   Neurological:      Mental Status: She is alert and oriented to person, place, and time.      Cranial Nerves: No cranial nerve deficit.   Psychiatric:         Thought Content: Thought content normal.        Extremities: No edema, cyanosis or clubbing.    Assessment & Plan    1.  Left upper quadrant pain with nausea rule out peptic ulcer disease, gastritis and pancreaticobiliary pathology.  Could also be due to adhesions from previous gastric sleeve surgery.  Continue Prilosec and add Bentyl.  Proceed with EGD for further evaluation.  2.  Abdominal pain with diarrhea alternating with constipation rule out IBD, IBS and colorectal neoplasia.  Add Bentyl and high-fiber diet.  Proceed with colonoscopy for further evaluation.  CT abdomen pelvis if colonoscopy is unremarkable.  3.  Obesity, recommend exercise and diet control.  4.  History of gastric sleeve surgery  Diagnoses and all orders for this visit:    1. Left upper quadrant abdominal pain (Primary)  -     Case Request; Standing  -     dextrose 5 % and sodium chloride 0.45 % infusion  -     Case Request    2. Nausea  -     Case Request; Standing  -     dextrose 5 % and sodium chloride 0.45 % infusion  -     Case Request    3. Other constipation  -     Case Request; Standing  -      dextrose 5 % and sodium chloride 0.45 % infusion  -     Case Request    4. Diarrhea, unspecified type  -     Case Request; Standing  -     dextrose 5 % and sodium chloride 0.45 % infusion  -     Case Request    5. Hemorrhage of anus and rectum  -     Case Request; Standing  -     dextrose 5 % and sodium chloride 0.45 % infusion  -     Case Request    Other orders  -     Follow Anesthesia Guidelines / Standing Orders; Future  -     Obtain Informed Consent; Future  -     Implement Anesthesia Orders Day of Procedure; Standing  -     Obtain Informed Consent; Standing  -     POC Glucose Once; Standing  -     Insert Peripheral IV; Standing  -     dicyclomine (BENTYL) 20 MG tablet; Take 1 tablet by mouth Every 6 (Six) Hours for 30 days.  Dispense: 120 tablet; Refill: 5        ESOPHAGOGASTRODUODENOSCOPY (N/A), COLONOSCOPY (N/A)     Diagnosis Plan   1. Left upper quadrant abdominal pain  Case Request    dextrose 5 % and sodium chloride 0.45 % infusion    Case Request      2. Nausea  Case Request    dextrose 5 % and sodium chloride 0.45 % infusion    Case Request      3. Other constipation  Case Request    dextrose 5 % and sodium chloride 0.45 % infusion    Case Request      4. Diarrhea, unspecified type  Case Request    dextrose 5 % and sodium chloride 0.45 % infusion    Case Request      5. Hemorrhage of anus and rectum  Case Request    dextrose 5 % and sodium chloride 0.45 % infusion    Case Request          Anticipated Surgical Procedure:  Orders Placed This Encounter   Procedures   • Follow Anesthesia Guidelines / Standing Orders     Standing Status:   Future   • Obtain Informed Consent     Standing Status:   Future     Order Specific Question:   Informed Consent Given For     Answer:   egd and colonoscopy       The risks, benefits, and alternatives of this procedure have been discussed with the patient or the responsible party- the patient understands and agrees to proceed.            This document has been  electronically signed by Susi Power MD on October 30, 2022 08:51 CDT

## 2022-11-01 ENCOUNTER — ANESTHESIA (OUTPATIENT)
Dept: GASTROENTEROLOGY | Facility: HOSPITAL | Age: 62
End: 2022-11-01

## 2022-11-01 ENCOUNTER — HOSPITAL ENCOUNTER (OUTPATIENT)
Facility: HOSPITAL | Age: 62
Setting detail: HOSPITAL OUTPATIENT SURGERY
Discharge: HOME | End: 2022-11-01
Attending: INTERNAL MEDICINE | Admitting: INTERNAL MEDICINE

## 2022-11-01 ENCOUNTER — ANESTHESIA EVENT (OUTPATIENT)
Dept: GASTROENTEROLOGY | Facility: HOSPITAL | Age: 62
End: 2022-11-01

## 2022-11-01 VITALS
RESPIRATION RATE: 18 BRPM | WEIGHT: 176 LBS | HEART RATE: 64 BPM | HEIGHT: 61 IN | SYSTOLIC BLOOD PRESSURE: 105 MMHG | BODY MASS INDEX: 33.23 KG/M2 | TEMPERATURE: 96.9 F | OXYGEN SATURATION: 98 % | DIASTOLIC BLOOD PRESSURE: 67 MMHG

## 2022-11-01 DIAGNOSIS — R19.7 DIARRHEA, UNSPECIFIED TYPE: ICD-10-CM

## 2022-11-01 DIAGNOSIS — K62.5 HEMORRHAGE OF ANUS AND RECTUM: ICD-10-CM

## 2022-11-01 DIAGNOSIS — R11.0 NAUSEA: ICD-10-CM

## 2022-11-01 DIAGNOSIS — R10.12 LEFT UPPER QUADRANT ABDOMINAL PAIN: ICD-10-CM

## 2022-11-01 DIAGNOSIS — K59.09 OTHER CONSTIPATION: ICD-10-CM

## 2022-11-01 PROCEDURE — 88305 TISSUE EXAM BY PATHOLOGIST: CPT

## 2022-11-01 PROCEDURE — 88342 IMHCHEM/IMCYTCHM 1ST ANTB: CPT

## 2022-11-01 PROCEDURE — 25010000002 PROPOFOL 10 MG/ML EMULSION: Performed by: NURSE ANESTHETIST, CERTIFIED REGISTERED

## 2022-11-01 PROCEDURE — 43239 EGD BIOPSY SINGLE/MULTIPLE: CPT | Performed by: INTERNAL MEDICINE

## 2022-11-01 PROCEDURE — 45380 COLONOSCOPY AND BIOPSY: CPT | Performed by: INTERNAL MEDICINE

## 2022-11-01 RX ORDER — PROPOFOL 10 MG/ML
VIAL (ML) INTRAVENOUS AS NEEDED
Status: DISCONTINUED | OUTPATIENT
Start: 2022-11-01 | End: 2022-11-01 | Stop reason: SURG

## 2022-11-01 RX ORDER — DEXTROSE AND SODIUM CHLORIDE 5; .45 G/100ML; G/100ML
30 INJECTION, SOLUTION INTRAVENOUS CONTINUOUS PRN
Status: DISCONTINUED | OUTPATIENT
Start: 2022-11-01 | End: 2022-11-01 | Stop reason: HOSPADM

## 2022-11-01 RX ORDER — LIDOCAINE HYDROCHLORIDE 20 MG/ML
INJECTION, SOLUTION INTRAVENOUS AS NEEDED
Status: DISCONTINUED | OUTPATIENT
Start: 2022-11-01 | End: 2022-11-01 | Stop reason: SURG

## 2022-11-01 RX ADMIN — PROPOFOL 40 MG: 10 INJECTION, EMULSION INTRAVENOUS at 13:13

## 2022-11-01 RX ADMIN — PROPOFOL 80 MG: 10 INJECTION, EMULSION INTRAVENOUS at 13:05

## 2022-11-01 RX ADMIN — LIDOCAINE HYDROCHLORIDE 60 MG: 20 INJECTION, SOLUTION INTRAVENOUS at 13:05

## 2022-11-01 RX ADMIN — DEXTROSE AND SODIUM CHLORIDE 30 ML/HR: 5; 450 INJECTION, SOLUTION INTRAVENOUS at 12:48

## 2022-11-01 RX ADMIN — PROPOFOL 30 MG: 10 INJECTION, EMULSION INTRAVENOUS at 13:08

## 2022-11-01 RX ADMIN — PROPOFOL 20 MG: 10 INJECTION, EMULSION INTRAVENOUS at 13:16

## 2022-11-01 NOTE — ANESTHESIA POSTPROCEDURE EVALUATION
Patient: Kimberly Means    Procedure Summary     Date: 11/01/22 Room / Location: [facility] ENDOSCOPY 2 / [facility] ENDOSCOPY    Anesthesia Start: 1303 Anesthesia Stop: 1323    Procedures:       ESOPHAGOGASTRODUODENOSCOPY      COLONOSCOPY Diagnosis:       Left upper quadrant abdominal pain      Nausea      Other constipation      Diarrhea, unspecified type      Hemorrhage of anus and rectum      (Left upper quadrant abdominal pain [R10.12])      (Nausea [R11.0])      (Other constipation [K59.09])      (Diarrhea, unspecified type [R19.7])      (Hemorrhage of anus and rectum [K62.5])    Surgeons: Susi Power MD Provider: Milla Khan CRNA    Anesthesia Type: general ASA Status: 3          Anesthesia Type: general    Vitals  No vitals data found for the desired time range.          Post Anesthesia Care and Evaluation    Patient location during evaluation: bedside  Patient participation: complete - patient participated  Level of consciousness: sleepy but conscious  Pain score: 0  Pain management: adequate    Airway patency: patent  Anesthetic complications: No anesthetic complications  PONV Status: none  Cardiovascular status: acceptable  Respiratory status: acceptable and room air  Hydration status: acceptable  No anesthesia care post op

## 2022-11-01 NOTE — ANESTHESIA PREPROCEDURE EVALUATION
Anesthesia Evaluation     NPO Solid Status: > 8 hours  NPO Liquid Status: > 8 hours           Airway   Mallampati: II  TM distance: >3 FB  Neck ROM: full  No difficulty expected  Dental    (+) poor dentition    Pulmonary - negative pulmonary ROS    breath sounds clear to auscultation  Cardiovascular   Exercise tolerance: good (4-7 METS)    ECG reviewed    (+) hypertension well controlled less than 2 medications, CAD, cardiac stents more than 12 months ago hyperlipidemia,     ROS comment: Normal ECG  When compared with ECG of 20-MAY-2021 14:41,  No significant change was found    Neuro/Psych  (+) psychiatric history Anxiety,    GI/Hepatic/Renal/Endo    (+) obesity,  GERD well controlled,      Musculoskeletal     Abdominal    Substance History - negative use     OB/GYN          Other                        Anesthesia Plan    ASA 3     general   total IV anesthesia  intravenous induction     Anesthetic plan, risks, benefits, and alternatives have been provided, discussed and informed consent has been obtained with: patient.  Pre-procedure education provided      CODE STATUS:

## 2022-11-04 LAB — REF LAB TEST METHOD: NORMAL

## 2022-11-14 ENCOUNTER — OFFICE VISIT (OUTPATIENT)
Dept: GASTROENTEROLOGY | Facility: CLINIC | Age: 62
End: 2022-11-14

## 2022-11-14 VITALS
DIASTOLIC BLOOD PRESSURE: 76 MMHG | HEIGHT: 61 IN | HEART RATE: 65 BPM | SYSTOLIC BLOOD PRESSURE: 137 MMHG | BODY MASS INDEX: 33.04 KG/M2 | WEIGHT: 175 LBS

## 2022-11-14 DIAGNOSIS — K58.9 IRRITABLE BOWEL SYNDROME, UNSPECIFIED TYPE: Primary | ICD-10-CM

## 2022-11-14 PROCEDURE — 99213 OFFICE O/P EST LOW 20 MIN: CPT | Performed by: INTERNAL MEDICINE

## 2022-11-17 ENCOUNTER — OFFICE VISIT (OUTPATIENT)
Dept: FAMILY MEDICINE CLINIC | Facility: CLINIC | Age: 62
End: 2022-11-17

## 2022-11-17 VITALS
BODY MASS INDEX: 33.04 KG/M2 | SYSTOLIC BLOOD PRESSURE: 122 MMHG | DIASTOLIC BLOOD PRESSURE: 74 MMHG | WEIGHT: 175 LBS | HEIGHT: 61 IN | HEART RATE: 68 BPM | OXYGEN SATURATION: 99 %

## 2022-11-17 DIAGNOSIS — I10 ESSENTIAL HYPERTENSION: ICD-10-CM

## 2022-11-17 DIAGNOSIS — E66.09 CLASS 1 OBESITY DUE TO EXCESS CALORIES WITH SERIOUS COMORBIDITY AND BODY MASS INDEX (BMI) OF 33.0 TO 33.9 IN ADULT: ICD-10-CM

## 2022-11-17 DIAGNOSIS — K21.9 GASTROESOPHAGEAL REFLUX DISEASE WITHOUT ESOPHAGITIS: ICD-10-CM

## 2022-11-17 DIAGNOSIS — Z00.00 ANNUAL PHYSICAL EXAM: Primary | ICD-10-CM

## 2022-11-17 DIAGNOSIS — E78.01 FAMILIAL HYPERCHOLESTEROLEMIA: ICD-10-CM

## 2022-11-17 PROCEDURE — 99396 PREV VISIT EST AGE 40-64: CPT | Performed by: NURSE PRACTITIONER

## 2022-11-17 NOTE — PROGRESS NOTES
"Chief Complaint  Annual Exam    Subjective  Annual physical exam.  Has high blood pressure, high cholesterol, depression and obesity.  Currently on Wegovy for weight loss.  Has not had any significant weight loss but has not gained any weight either.  Recently saw Dr. Carmona and had upper and lower endoscopy and started on Bentyl.  \"It has really helped my stomach.\"      Kimberly Means presents to [facility]  Hypertension  This is a chronic problem. The current episode started more than 1 year ago. The problem is unchanged. The problem is controlled. Associated symptoms include blurred vision. Pertinent negatives include no shortness of breath. There are no associated agents to hypertension. Risk factors for coronary artery disease include dyslipidemia, family history, post-menopausal state, stress and obesity. Past treatments include beta blockers. Current antihypertension treatment includes beta blockers. The current treatment provides significant improvement. There are no compliance problems.  Hypertensive end-organ damage includes CAD/MI.   Hyperlipidemia  This is a chronic problem. The current episode started more than 1 year ago. The problem is controlled. Recent lipid tests were reviewed and are high. Exacerbating diseases include obesity. Factors aggravating her hyperlipidemia include beta blockers and fatty foods. Pertinent negatives include no shortness of breath. Current antihyperlipidemic treatment includes statins and ezetimibe. The current treatment provides moderate improvement of lipids. There are no compliance problems.  Risk factors for coronary artery disease include dyslipidemia, family history, hypertension, obesity and post-menopausal.   Depression  Visit Type: follow-up  Patient presents with the following symptoms: depressed mood.  Patient is not experiencing: confusion, shortness of breath, suicidal ideas and thoughts of " death.  Frequency of symptoms: occasionally   Severity: mild   Sleep quality: good  Nighttime awakenings: occasional  Compliance with medications:  %        Obesity  This is a chronic problem. The current episode started more than 1 year ago. The problem occurs constantly. The problem has been unchanged. Associated symptoms include nausea and vomiting. Pertinent negatives include no chills, coughing, diaphoresis, fatigue or fever. The symptoms are aggravated by drinking and eating. Treatments tried: gastric sleeve and Wegovy  The treatment provided mild relief.     Current Outpatient Medications on File Prior to Visit   Medication Sig Dispense Refill   • butalbital-aspirin-caffeine (FIORINAL) -40 MG capsule Take 1 capsule by mouth Every 4 (Four) Hours As Needed for Headache. 60 capsule 0   • clopidogrel (PLAVIX) 75 MG tablet Take 1 tablet by mouth Daily. 90 tablet 3   • cyanocobalamin 1000 MCG/ML injection INJECT 1ML INTO THE APPROPRIATE MUSCLE AS DIRECTED BY PRESCRIBER EVERY 30 DAYS 3 mL 3   • escitalopram (LEXAPRO) 10 MG tablet Take 1 tablet by mouth Every Night.     • ezetimibe-simvastatin (VYTORIN) 10-40 MG per tablet Take 1 tablet by mouth Every Night. 90 tablet 3   • fluticasone (FLONASE) 50 MCG/ACT nasal spray 2 sprays into the nostril(s) as directed by provider Daily. Administer 2 sprays in each nostril for each dose. 16 g 5   • metoprolol succinate XL (TOPROL-XL) 25 MG 24 hr tablet Take 1 tablet by mouth Every Night.     • nystatin (MYCOSTATIN) 731913 UNIT/GM cream Apply 1 application topically to the appropriate area as directed As Needed.     • omeprazole (priLOSEC) 20 MG capsule Take 1 capsule by mouth Every Night.     • vitamin D (ERGOCALCIFEROL) 1.25 MG (46564 UT) capsule capsule Take 1 capsule by mouth Every 7 (Seven) Days. 12 capsule 3   • [DISCONTINUED] Semaglutide-Weight Management 2.4 MG/0.75ML solution auto-injector Inject 2.4 mg under the skin into the appropriate area as directed 1  "(One) Time Per Week. 4.5 mL 1     No current facility-administered medications on file prior to visit.     Allergies   Allergen Reactions   • Kiwi Extract Anaphylaxis   • Lortab [Hydrocodone-Acetaminophen] Confusion and Hallucinations   • Paxil [Paroxetine] Mental Status Change   • Dilaudid [Hydromorphone] Palpitations          • Morphine And Related Itching   • Sulfa Antibiotics Hives       Objective   Vital Signs:  /74   Pulse 68   Ht 154.9 cm (61\")   Wt 79.4 kg (175 lb)   SpO2 99%   BMI 33.07 kg/m²   Estimated body mass index is 33.07 kg/m² as calculated from the following:    Height as of this encounter: 154.9 cm (61\").    Weight as of this encounter: 79.4 kg (175 lb).      Physical Exam  Vitals and nursing note reviewed.   Constitutional:       Appearance: Normal appearance. She is well-developed. She is obese.   HENT:      Head: Normocephalic and atraumatic.      Right Ear: Tympanic membrane, ear canal and external ear normal.      Left Ear: Tympanic membrane, ear canal and external ear normal.      Nose: Nose normal.      Mouth/Throat:      Mouth: Mucous membranes are moist.      Pharynx: Oropharynx is clear.   Eyes:      Extraocular Movements: Extraocular movements intact.      Conjunctiva/sclera: Conjunctivae normal.      Pupils: Pupils are equal, round, and reactive to light.   Cardiovascular:      Rate and Rhythm: Normal rate and regular rhythm.      Heart sounds: Normal heart sounds.   Pulmonary:      Effort: Pulmonary effort is normal.      Breath sounds: Normal breath sounds.   Abdominal:      General: Bowel sounds are normal.      Palpations: Abdomen is soft.   Musculoskeletal:         General: Normal range of motion.      Cervical back: Normal range of motion and neck supple.   Skin:     General: Skin is warm.      Capillary Refill: Capillary refill takes less than 2 seconds.   Neurological:      Mental Status: She is alert and oriented to person, place, and time.   Psychiatric:         " Behavior: Behavior normal.        Result Review :                Assessment and Plan   Diagnoses and all orders for this visit:    1. Annual physical exam (Primary)    2. Essential hypertension    3. Familial hypercholesterolemia    4. Gastroesophageal reflux disease without esophagitis    5. Class 1 obesity due to excess calories with serious comorbidity and body mass index (BMI) of 33.0 to 33.9 in adult  -     Semaglutide-Weight Management 2.4 MG/0.75ML solution auto-injector; Inject 2.4 mg under the skin into the appropriate area as directed 1 (One) Time Per Week.  Dispense: 4.5 mL; Refill: 1      1.  Annual physical exam:  Continue on current medications as previously prescribed   Counseling on importance of heathy eating habits and regular physical activity regimen on improving overall physical and mental health.     2.  Essential hypertension:  Normotensive  Continue low-sodium diet  Target systolic BP to remain below 130    3.  Hypercholesterolemia:  Continue low-fat diet  Continue Vytorin previously prescribed    4.  Gastroesophageal reflux disease without esophagitis:  Continue Prilosec  Avoid eating approximately 2 hours prior to bedtime    5.  Class I obesity due to excess calories with serious comorbidity and a body mass index of 33.0-33.9 in adult:  BMI is >= 30 and <35. (Class 1 Obesity). The following options were offered after discussion;: exercise counseling/recommendations, nutrition counseling/recommendations and pharmacological intervention options  Body mass index is 33.07 kg/m².  Continue glucosamine and refill Rx sent to pharmacy  Thoroughly educated patient on importance of increasing physical activity regimen to a minimum of 30 minutes/day at least 5 days/week  Discussed using her treadmill at home while watching TV so that it will not feel like exercise       Follow Up   Return in about 6 months (around 5/17/2023) for Recheck.  Patient was given instructions and counseling regarding her  condition or for health maintenance advice. Please see specific information pulled into the AVS if appropriate.         This document has been electronically signed by NICOLLE Gomez on November 17, 2022 08:30 CST

## 2022-11-20 NOTE — PROGRESS NOTES
Chief Complaint   Patient presents with   • Follow-up      after endo        Subjective    Kimberly Means is a 62 y.o. female.    History of Present Illness  Patient presented to GI clinic for follow-up visit today.  Feels better currently.  Abdominal pain is well controlled.  Denied nausea or vomiting.  Bowel movements regular.  Weight is stable.  EGD was consistent with hiatal hernia, esophagitis and gastritis.  Colonoscopy was consistent with diverticulosis and hemorrhoids.  Path was unremarkable.       The following portions of the patient's history were reviewed and updated as appropriate:   Past Medical History:   Diagnosis Date   • Abdominal pain    • Acute bronchitis    • Acute left otitis media    • Acute otitis externa    • Allergic rhinitis     Due to pollen   • Ankle pain    • Asthma    • Astigmatism    • Bacterial pneumonia 12/02/2015   • Contact dermatitis    • Coronary arteriosclerosis     stent to LAD 2006   .  followed by cassandra   • Cough    • Depressive disorder     pt states she has not suffered from depression, takes lexapro for anxiety   • Diarrhea    • Dysuria    • Encounter for Papanicolaou smear of cervix 06/01/2009   • Epigastric pain    • Epistaxis    • Essential hypertension    • Fatigue    • Fever    • Functional diarrhea    • Gastroenteritis    • Generalized anxiety disorder 01/2015    given samples of lexapro.   • GERD (gastroesophageal reflux disease)    • Heel pain    • Hematochezia    • Hernia of abdominal wall 05/13/2016    incarcerated- primarily repaired. Now recurrent      • History of bone density study 05/27/2014   • History of colonoscopy     diagnostic (screening) 13457- Normal colonoscopy.;  Last Performed: 12/10/2015   • History of esophagogastroduodenoscopy     epigastric pain, gastritis without bleeding,gastroesophageal reflux without esophagitis;  Last Performed: 01/06/1999   • History of mammogram 06/29/2016   • History of screening mammography    • Hyperglycemia     • Hyperlipidemia    • Impaired glucose tolerance    • Irritable bowel syndrome    • Leaky heart valve    • Measles    • Mechanical complication of internal orthopedic device ([facility])    • Migraine    • Morbid obesity due to excess calories ([facility])    • Mumps    • Myopia    • Nausea    • Need for DTaP vaccine 01/17/2015   • Retinal tear    • Spasm of back muscles    • Sprain of ankle    • Umbilical hernia    • Upper respiratory infection    • Urinary tract infectious disease    • Ventral hernia     Open ventral hernioplasty. Incarcerated ventral hernia;  Last Performed: 07/17/2012   • Vitamin D deficiency    • Vitreous detachment    • Vitreous opacities      Past Surgical History:   Procedure Laterality Date   • CERVICAL CONIZATION      stress urinary incontinence,endocervical dysplasia;  Last Performed: 09/05/1989   • CHOLECYSTECTOMY  08/26/1993    Laparoscopic   • COLONOSCOPY N/A 02/17/2017    Procedure: COLONOSCOPY;  Surgeon: Young Villegas MD;  Location: [facility] ENDOSCOPY;  Service:    • COLONOSCOPY N/A 11/1/2022    Procedure: COLONOSCOPY;  Surgeon: Susi Power MD;  Location: [facility] ENDOSCOPY;  Service: Gastroenterology;  Laterality: N/A;   • CORONARY ANGIOPLASTY      stent to LAD;  Last Performed: 2006   • ENDOSCOPY N/A 11/1/2022    Procedure: ESOPHAGOGASTRODUODENOSCOPY;  Surgeon: Susi Power MD;  Location: [facility] ENDOSCOPY;  Service: Gastroenterology;  Laterality: N/A;   • EPIGASTRIC HERNIA REPAIR  02/02/1973   • GASTRIC SLEEVE LAPAROSCOPIC  11/08/2016   • HYSTERECTOMY  09/05/1989    mmk urethropexy   • ORIF ANKLE FRACTURE Left    • TONSILLECTOMY      postoperative tonsillectomy,transfixed bleeding vessel, right tonsillar fossa;  Last Performed: 01/05/1974   • VAGINAL DELIVERY      x 2     Family History   Problem Relation Age of Onset   • Thyroid disease Mother    • Diabetes type II Mother    • Long QT syndrome Mother    • Hypothyroidism Mother    • Breast cancer Mother    • Thyroid disease Father     • Coronary artery disease Father    • Tremor Father    • Diabetes Father    • COPD Father    • Colon cancer Father    • Coronary artery disease Other    • Diabetes Other    • Hypertension Other      OB History        2    Para   2    Term   2            AB        Living   2       SAB        IAB        Ectopic        Molar        Multiple        Live Births                  Prior to Admission medications    Medication Sig Start Date End Date Taking? Authorizing Provider   butalbital-aspirin-caffeine (FIORINAL) -40 MG capsule Take 1 capsule by mouth Every 4 (Four) Hours As Needed for Headache. 21  Yes Naomi Ross APRN   clopidogrel (PLAVIX) 75 MG tablet Take 1 tablet by mouth Daily. 22  Yes Naomi Ross APRN   cyanocobalamin 1000 MCG/ML injection INJECT 1ML INTO THE APPROPRIATE MUSCLE AS DIRECTED BY PRESCRIBER EVERY 30 DAYS 21  Yes Naomi Ross APRN   escitalopram (LEXAPRO) 10 MG tablet Take 1 tablet by mouth Every Night.   Yes ProviderNicole MD   ezetimibe-simvastatin (VYTORIN) 10-40 MG per tablet Take 1 tablet by mouth Every Night. 3/22/22  Yes Nallely Foster MD   fluticasone (FLONASE) 50 MCG/ACT nasal spray 2 sprays into the nostril(s) as directed by provider Daily. Administer 2 sprays in each nostril for each dose. 21  Yes Naomi Ross APRN   metoprolol succinate XL (TOPROL-XL) 25 MG 24 hr tablet Take 1 tablet by mouth Every Night.   Yes Nicole Farrell MD   nystatin (MYCOSTATIN) 141034 UNIT/GM cream Apply 1 application topically to the appropriate area as directed As Needed. 10/22/20  Yes Nicole Farrell MD   omeprazole (priLOSEC) 20 MG capsule Take 1 capsule by mouth Every Night.   Yes Nicole Farrell MD   vitamin D (ERGOCALCIFEROL) 1.25 MG (11928 UT) capsule capsule Take 1 capsule by mouth Every 7 (Seven) Days. 3/17/22  Yes Naomi Ross APRN   Semaglutide-Weight Management 2.4 MG/0.75ML solution auto-injector Inject 2.4 mg  under the skin into the appropriate area as directed 1 (One) Time Per Week. 11/17/22   Naomi Ross, NICOLLE     Allergies   Allergen Reactions   • Kiwi Extract Anaphylaxis   • Lortab [Hydrocodone-Acetaminophen] Confusion and Hallucinations   • Paxil [Paroxetine] Mental Status Change   • Dilaudid [Hydromorphone] Palpitations          • Morphine And Related Itching   • Sulfa Antibiotics Hives     Social History     Socioeconomic History   • Marital status:      Spouse name: Milla   • Number of children: 2   Tobacco Use   • Smoking status: Never   • Smokeless tobacco: Never   Vaping Use   • Vaping Use: Never used   Substance and Sexual Activity   • Alcohol use: No   • Drug use: No   • Sexual activity: Defer       Review of Systems  Review of Systems   Constitutional: Negative for chills, fatigue, fever and unexpected weight change.   HENT: Negative for congestion, ear discharge, hearing loss, nosebleeds and sore throat.    Eyes: Negative for pain, discharge and redness.   Respiratory: Negative for cough, chest tightness, shortness of breath and wheezing.    Cardiovascular: Negative for chest pain and palpitations.   Gastrointestinal: Negative for abdominal distention, abdominal pain, blood in stool, constipation, diarrhea, nausea and vomiting.   Endocrine: Negative for cold intolerance, polydipsia, polyphagia and polyuria.   Genitourinary: Negative for dysuria, flank pain, frequency, hematuria and urgency.   Musculoskeletal: Negative for arthralgias, back pain, joint swelling and myalgias.   Skin: Negative for color change, pallor and rash.   Neurological: Negative for tremors, seizures, syncope, weakness and headaches.   Hematological: Negative for adenopathy. Does not bruise/bleed easily.   Psychiatric/Behavioral: Negative for behavioral problems, confusion, dysphoric mood, hallucinations and suicidal ideas. The patient is not nervous/anxious.         /76 (BP Location: Left arm, Patient Position: Sitting,  "Cuff Size: Adult)   Pulse 65   Ht 154.9 cm (61\")   Wt 79.4 kg (175 lb)   LMP 04/11/1989 (Within Months)   BMI 33.07 kg/m²     Objective    Physical Exam  Constitutional:       Appearance: She is well-developed.   HENT:      Head: Normocephalic and atraumatic.   Eyes:      Conjunctiva/sclera: Conjunctivae normal.      Pupils: Pupils are equal, round, and reactive to light.   Neck:      Thyroid: No thyromegaly.   Cardiovascular:      Rate and Rhythm: Normal rate and regular rhythm.      Heart sounds: Normal heart sounds. No murmur heard.  Pulmonary:      Effort: Pulmonary effort is normal.      Breath sounds: Normal breath sounds. No wheezing.   Abdominal:      General: Bowel sounds are normal. There is no distension.      Palpations: Abdomen is soft. There is no mass.      Tenderness: There is no abdominal tenderness.      Hernia: No hernia is present.   Genitourinary:     Comments: No lesions noted  Musculoskeletal:         General: No tenderness. Normal range of motion.      Cervical back: Normal range of motion and neck supple.   Lymphadenopathy:      Cervical: No cervical adenopathy.   Skin:     General: Skin is warm and dry.      Findings: No rash.   Neurological:      Mental Status: She is alert and oriented to person, place, and time.      Cranial Nerves: No cranial nerve deficit.   Psychiatric:         Thought Content: Thought content normal.         Assessment & Plan    No diagnosis found..   1.  Left upper quadrant pain with nausea, well controlled.  Likely due to gastritis.  Continue Prilosec and Bentyl.  2.  Abdominal pain with diarrhea alternating constipation, well controlled.  Continue high-fiber diet and Bentyl.  3.  Diverticulosis, continue high-fiber diet.  4.  Obesity s/p Gastric Sleeve Surgery, Patient to Continue Exercise and Diet Control.  5.  Rectal Bleeding, Resolved.  Likely Hemorrhoidal.    Orders placed during this encounter include:  No orders of the defined types were placed in this " encounter.      * Surgery not found *    Review and/or summary of lab tests, radiology, procedures, medications. Review and summary of old records and obtaining of history. The risks and benefits of my recommendations, as well as other treatment options were discussed with the patient today. Questions were answered.    No orders of the defined types were placed in this encounter.      Follow-up: Return in about 3 months (around 2/14/2023).                     This document has been electronically signed by Susi Power MD on November 20, 2022 13:48 CST

## 2022-12-15 DIAGNOSIS — E53.8 VITAMIN B 12 DEFICIENCY: ICD-10-CM

## 2022-12-15 RX ORDER — CYANOCOBALAMIN 1000 UG/ML
1000 INJECTION, SOLUTION INTRAMUSCULAR; SUBCUTANEOUS
Qty: 3 ML | Refills: 3 | Status: SHIPPED | OUTPATIENT
Start: 2022-12-15

## 2023-01-27 ENCOUNTER — OFFICE VISIT (OUTPATIENT)
Dept: CARDIOLOGY | Facility: CLINIC | Age: 63
End: 2023-01-27
Payer: COMMERCIAL

## 2023-01-27 VITALS
BODY MASS INDEX: 32.66 KG/M2 | SYSTOLIC BLOOD PRESSURE: 120 MMHG | OXYGEN SATURATION: 100 % | DIASTOLIC BLOOD PRESSURE: 70 MMHG | HEART RATE: 62 BPM | HEIGHT: 61 IN | TEMPERATURE: 98 F | WEIGHT: 173 LBS

## 2023-01-27 DIAGNOSIS — E78.2 MIXED HYPERLIPIDEMIA: ICD-10-CM

## 2023-01-27 DIAGNOSIS — I10 ESSENTIAL HYPERTENSION: ICD-10-CM

## 2023-01-27 DIAGNOSIS — I25.10 CORONARY ARTERY DISEASE INVOLVING NATIVE CORONARY ARTERY OF NATIVE HEART WITHOUT ANGINA PECTORIS: Primary | ICD-10-CM

## 2023-01-27 PROCEDURE — 99214 OFFICE O/P EST MOD 30 MIN: CPT | Performed by: INTERNAL MEDICINE

## 2023-01-27 NOTE — PROGRESS NOTES
Kimberly Means  62 y.o. female      1. Coronary artery disease involving native coronary artery of native heart without angina pectoris    2. Essential hypertension    3. Mixed hyperlipidemia        History of Present Illness:  Kimberly Means is here for follow-up of coronary artery disease, hypertension hyperlipidemia.  She has progressed well and denied any cardiac symptoms with no chest pain, shortness of breath or palpitation.  She is able to perform all her activities of daily living.  Clinical exam today was unremarkable.  Her heart rate and blood pressure were in the normal range.    Her PCI to left anterior descending coronary artery was back in 2007.    SUBJECTIVE    Allergies   Allergen Reactions   • Kiwi Extract Anaphylaxis   • Lortab [Hydrocodone-Acetaminophen] Confusion and Hallucinations   • Paxil [Paroxetine] Mental Status Change   • Dilaudid [Hydromorphone] Palpitations          • Morphine And Related Itching   • Sulfa Antibiotics Hives         Past Medical History:   Diagnosis Date   • Abdominal pain    • Acute bronchitis    • Acute left otitis media    • Acute otitis externa    • Allergic rhinitis     Due to pollen   • Ankle pain    • Asthma    • Astigmatism    • Bacterial pneumonia 12/02/2015   • Contact dermatitis    • Coronary arteriosclerosis     stent to LAD 2006   .  followed by cassandra   • Cough    • Depressive disorder     pt states she has not suffered from depression, takes lexapro for anxiety   • Diarrhea    • Dysuria    • Encounter for Papanicolaou smear of cervix 06/01/2009   • Epigastric pain    • Epistaxis    • Essential hypertension    • Fatigue    • Fever    • Functional diarrhea    • Gastroenteritis    • Generalized anxiety disorder 01/2015    given samples of lexapro.   • GERD (gastroesophageal reflux disease)    • Heel pain    • Hematochezia    • Hernia of abdominal wall 05/13/2016    incarcerated- primarily repaired. Now recurrent      • History of bone density study  05/27/2014   • History of colonoscopy     diagnostic (screening) 36407- Normal colonoscopy.;  Last Performed: 12/10/2015   • History of esophagogastroduodenoscopy     epigastric pain, gastritis without bleeding,gastroesophageal reflux without esophagitis;  Last Performed: 01/06/1999   • History of mammogram 06/29/2016   • History of screening mammography    • Hyperglycemia    • Hyperlipidemia    • Impaired glucose tolerance    • Irritable bowel syndrome    • Leaky heart valve    • Measles    • Mechanical complication of internal orthopedic device (Formerly [facility])    • Migraine    • Morbid obesity due to excess calories (Formerly [facility])    • Mumps    • Myopia    • Nausea    • Need for DTaP vaccine 01/17/2015   • Retinal tear    • Spasm of back muscles    • Sprain of ankle    • Umbilical hernia    • Upper respiratory infection    • Urinary tract infectious disease    • Ventral hernia     Open ventral hernioplasty. Incarcerated ventral hernia;  Last Performed: 07/17/2012   • Vitamin D deficiency    • Vitreous detachment    • Vitreous opacities          Past Surgical History:   Procedure Laterality Date   • CERVICAL CONIZATION      stress urinary incontinence,endocervical dysplasia;  Last Performed: 09/05/1989   • CHOLECYSTECTOMY  08/26/1993    Laparoscopic   • COLONOSCOPY N/A 02/17/2017    Procedure: COLONOSCOPY;  Surgeon: Young Villegas MD;  Location: [facility] ENDOSCOPY;  Service:    • COLONOSCOPY N/A 11/1/2022    Procedure: COLONOSCOPY;  Surgeon: Susi Power MD;  Location: [facility] ENDOSCOPY;  Service: Gastroenterology;  Laterality: N/A;   • CORONARY ANGIOPLASTY      stent to LAD;  Last Performed: 2006   • ENDOSCOPY N/A 11/1/2022    Procedure: ESOPHAGOGASTRODUODENOSCOPY;  Surgeon: Susi Power MD;  Location: [facility] ENDOSCOPY;  Service: Gastroenterology;  Laterality: N/A;   • EPIGASTRIC HERNIA REPAIR  02/02/1973   • GASTRIC SLEEVE LAPAROSCOPIC  11/08/2016   • HYSTERECTOMY  09/05/1989    mmk urethropexy   • ORIF ANKLE FRACTURE  Left    • TONSILLECTOMY      postoperative tonsillectomy,transfixed bleeding vessel, right tonsillar fossa;  Last Performed: 01/05/1974   • VAGINAL DELIVERY      x 2         Family History   Problem Relation Age of Onset   • Thyroid disease Mother    • Diabetes type II Mother    • Long QT syndrome Mother    • Hypothyroidism Mother    • Breast cancer Mother    • Thyroid disease Father    • Coronary artery disease Father    • Tremor Father    • Diabetes Father    • COPD Father    • Colon cancer Father    • Coronary artery disease Other    • Diabetes Other    • Hypertension Other          Social History     Socioeconomic History   • Marital status:      Spouse name: Milla   • Number of children: 2   Tobacco Use   • Smoking status: Never   • Smokeless tobacco: Never   Vaping Use   • Vaping Use: Never used   Substance and Sexual Activity   • Alcohol use: No   • Drug use: No   • Sexual activity: Defer         Current Outpatient Medications   Medication Sig Dispense Refill   • butalbital-aspirin-caffeine (FIORINAL) -40 MG capsule Take 1 capsule by mouth Every 4 (Four) Hours As Needed for Headache. 60 capsule 0   • clopidogrel (PLAVIX) 75 MG tablet Take 1 tablet by mouth Daily. 90 tablet 3   • cyanocobalamin 1000 MCG/ML injection Inject 1 mL into the appropriate muscle as directed by prescriber Every 30 (Thirty) Days. 3 mL 3   • escitalopram (LEXAPRO) 10 MG tablet Take 1 tablet by mouth Every Night.     • ezetimibe-simvastatin (VYTORIN) 10-40 MG per tablet Take 1 tablet by mouth Every Night. 90 tablet 3   • fluticasone (FLONASE) 50 MCG/ACT nasal spray 2 sprays into the nostril(s) as directed by provider Daily. Administer 2 sprays in each nostril for each dose. 16 g 5   • metoprolol succinate XL (TOPROL-XL) 25 MG 24 hr tablet Take 1 tablet by mouth Every Night.     • nystatin (MYCOSTATIN) 239616 UNIT/GM cream Apply 1 application topically to the appropriate area as directed As Needed.     • omeprazole (priLOSEC)  "20 MG capsule Take 1 capsule by mouth Every Night.     • Semaglutide-Weight Management 2.4 MG/0.75ML solution auto-injector Inject 2.4 mg under the skin into the appropriate area as directed 1 (One) Time Per Week. 4.5 mL 1   • vitamin D (ERGOCALCIFEROL) 1.25 MG (29751 UT) capsule capsule Take 1 capsule by mouth Every 7 (Seven) Days. 12 capsule 3     No current facility-administered medications for this visit.         OBJECTIVE    /70 (BP Location: Left arm, Patient Position: Sitting, Cuff Size: Adult)   Pulse 62   Temp 98 °F (36.7 °C)   Ht 154.9 cm (61\")   Wt 78.5 kg (173 lb)   LMP 04/11/1989 (Within Months)   SpO2 100%   BMI 32.69 kg/m²         Review of Systems : The following systems were reviewed and no changes noted    Constitutional:  Denies recent weight loss, weight gain, fever or chills, no change in exercise tolerance     HENT:  Denies any hearing loss, epistaxis, hoarseness, or difficulty speaking.     Eyes: Wears eyeglasses or contact lenses     Respiratory:  Denies dyspnea with exertion,no cough, wheezing, or hemoptysis.     Cardiovascular: Negative for palpations, chest pain, orthopnea, PND, peripheral edema, syncope, or claudication.     Gastrointestinal:  Denies change in bowel habits, dyspepsia, ulcer disease, hematochezia, or melena.     Endocrine: Negative for cold intolerance, heat intolerance, polydipsia, polyphagia and polyuria.    Genitourinary: Negative.      Musculoskeletal: Denies any history of arthritic symptoms or back problems     Neurological:  Denies any history of recurrent headaches, strokes, TIA, or seizure disorder.     Hematological: Denies any food allergies, seasonal allergies, bleeding disorders, or lymphadenopathy.     Psychiatric/Behavioral: history of depression, no substance abuse, or change in cognitive function.        Physical Exam : The following systems were reassessed and no changes noted     Constitutional: Cooperative, alert and oriented,  in no acute " distress.      HENT:   Head: Normocephalic, normal hair patterns, no masses or tenderness.  Ears, Nose, and Throat: No gross abnormalities. No pallor or cyanosis.   Eyes: EOMS intact, PERRL, conjunctivae and lids unremarkable. Fundoscopic exam and visual fields not performed.   Neck: No palpable masses or adenopathy, no thyromegaly, no JVD, carotid pulses are full and equal bilaterally and without  Bruits.      Cardiovascular: Regular rhythm, S1 and S2 normal, no S3 or S4.  No murmurs, gallops, or rubs detected.      Pulmonary/Chest: Chest: normal symmetry, no tenderness to palpation, normal respiratory excursion, no intercostal retraction, no use of accessory muscles.                                  Pulmonary: Normal breath sounds. No rales or ronchi.     Abdominal: Abdomen soft, bowel sounds normoactive, no masses, no hepatosplenomegaly, non-tender, no bruits.     Musculoskeletal: No deformities, clubbing, cyanosis, erythema, or edema observed.     Neurological: No gross motor or sensory deficits noted, affect appropriate, oriented to time, person, place.      Skin: Warm and dry to the touch, no apparent skin lesions or masses noted.     Psychiatric: She has a normal mood and affect. Her behavior is normal. Judgment and thought content normal.        Lab Results   Component Value Date    WBC 7.30 08/11/2022    HGB 14.2 08/11/2022    HCT 43.4 08/11/2022    MCV 91.6 08/11/2022     08/11/2022     Lab Results   Component Value Date    GLUCOSE 74 08/11/2022    BUN 11 08/11/2022    CREATININE 0.65 08/11/2022    EGFRIFNONA 57 (L) 09/26/2021    BCR 16.9 08/11/2022    CO2 25.0 08/11/2022    CALCIUM 9.4 08/11/2022    ALBUMIN 4.30 08/11/2022    AST 18 08/11/2022    ALT 19 08/11/2022     Lab Results   Component Value Date    CHOL 156 08/11/2022    CHOL 176 03/09/2022    CHOL 136 12/02/2021     Lab Results   Component Value Date    TRIG 107 08/11/2022    TRIG 125 03/09/2022    TRIG 65 12/02/2021     Lab Results    Component Value Date    HDL 65 (H) 08/11/2022    HDL 66 (H) 03/09/2022    HDL 65 (H) 12/02/2021     No components found for: LDLCALC  Lab Results   Component Value Date    LDL 72 08/11/2022    LDL 88 03/09/2022    LDL 58 12/02/2021     No results found for: HDLLDLRATIO  No components found for: CHOLHDL  Lab Results   Component Value Date    HGBA1C 5.40 09/23/2019     Lab Results   Component Value Date    TSH 1.460 03/09/2022           ASSESSMENT AND PLAN  Kimberly Means is stable with no clinical evidence of progression of coronary artery disease.  She is able to perform her activities of daily living.  She has been watching her diet and has lost weight.    Her lab results from August 2022 were reviewed and CBC, CMP were within normal limits.  LDL was 72 and HDL 65.    Antiplatelet therapy with Plavix, Antihypertensive therapy with metoprolol succinate and lipid-lowering therapy with Vytorin has been continued.    Diagnoses and all orders for this visit:    1. Coronary artery disease involving native coronary artery of native heart without angina pectoris (Primary)    2. Essential hypertension    3. Mixed hyperlipidemia        Patient's Body mass index is 32.69 kg/m². BMI is above normal parameters. Recommendations include: exercise counseling and nutrition counseling.  Patient is a non-smoker    Nallely Foster MD  1/27/2023  09:42 CST

## 2023-02-06 ENCOUNTER — TELEPHONE (OUTPATIENT)
Dept: FAMILY MEDICINE CLINIC | Facility: CLINIC | Age: 63
End: 2023-02-06
Payer: COMMERCIAL

## 2023-02-06 DIAGNOSIS — E66.09 CLASS 1 OBESITY DUE TO EXCESS CALORIES WITH SERIOUS COMORBIDITY AND BODY MASS INDEX (BMI) OF 33.0 TO 33.9 IN ADULT: ICD-10-CM

## 2023-02-06 NOTE — TELEPHONE ENCOUNTER
[facility] calling to request additional info/ call [phone] [place] needs clinical info to process PA for wegovy 2.4 injection

## 2023-02-14 ENCOUNTER — TELEPHONE (OUTPATIENT)
Dept: FAMILY MEDICINE CLINIC | Facility: CLINIC | Age: 63
End: 2023-02-14
Payer: COMMERCIAL

## 2023-02-14 NOTE — TELEPHONE ENCOUNTER
[facility] called to see if Naomi would like to contine with a different PA for the Pt.s Wegovy medication. They stated the First PA was only partially denied.      You can call back at [phone]     Reference Duffy: [id number]

## 2023-02-21 ENCOUNTER — TELEPHONE (OUTPATIENT)
Dept: FAMILY MEDICINE CLINIC | Facility: CLINIC | Age: 63
End: 2023-02-21
Payer: COMMERCIAL

## 2023-02-21 NOTE — TELEPHONE ENCOUNTER
Spoke with patient notified that PA on wegovy was denied twice, just waiting on determination on Appeal. Which could take 3-5 days for a determination. Patients isn't qualified for mounjaro since patient doesn't have a diabetes diagnosis.

## 2023-02-21 NOTE — TELEPHONE ENCOUNTER
Patient called stating her Wegovy was not refilled and she was not sure about the PA. Patient was wanting to know if she needed to change to Mounjaro or what should she do. Patient stated she did not want to miss a dose. Please advise [phone]

## 2023-03-13 DIAGNOSIS — E55.9 VITAMIN D DEFICIENCY: ICD-10-CM

## 2023-03-13 RX ORDER — ERGOCALCIFEROL 1.25 MG/1
50000 CAPSULE ORAL
Qty: 12 CAPSULE | Refills: 3 | Status: SHIPPED | OUTPATIENT
Start: 2023-03-13

## 2023-03-27 RX ORDER — CEFUROXIME AXETIL 500 MG/1
500 TABLET ORAL 2 TIMES DAILY
Qty: 20 TABLET | Refills: 0 | Status: SHIPPED | OUTPATIENT
Start: 2023-03-27 | End: 2023-04-06

## 2023-03-27 RX ORDER — ESCITALOPRAM OXALATE 10 MG/1
10 TABLET ORAL NIGHTLY
Qty: 30 TABLET | Refills: 1 | Status: SHIPPED | OUTPATIENT
Start: 2023-03-27

## 2023-03-30 DIAGNOSIS — E78.2 MIXED HYPERLIPIDEMIA: ICD-10-CM

## 2023-03-30 RX ORDER — EZETIMIBE AND SIMVASTATIN 10; 40 MG/1; MG/1
1 TABLET ORAL NIGHTLY
Qty: 90 TABLET | Refills: 3 | Status: SHIPPED | OUTPATIENT
Start: 2023-03-30

## 2023-04-24 DIAGNOSIS — I25.10 CORONARY ARTERIOSCLEROSIS: ICD-10-CM

## 2023-04-24 RX ORDER — FLUTICASONE PROPIONATE 50 MCG
2 SPRAY, SUSPENSION (ML) NASAL DAILY
Qty: 16 G | Refills: 5 | Status: SHIPPED | OUTPATIENT
Start: 2023-04-24

## 2023-04-24 RX ORDER — CLOPIDOGREL BISULFATE 75 MG/1
75 TABLET ORAL DAILY
Qty: 90 TABLET | Refills: 3 | Status: SHIPPED | OUTPATIENT
Start: 2023-04-24

## 2023-04-26 RX ORDER — AMOXICILLIN 500 MG/1
500 CAPSULE ORAL 3 TIMES DAILY
Qty: 30 CAPSULE | Refills: 0 | Status: SHIPPED | OUTPATIENT
Start: 2023-04-26

## 2023-05-11 DIAGNOSIS — I25.10 CORONARY ARTERIOSCLEROSIS: ICD-10-CM

## 2023-05-11 DIAGNOSIS — I10 ESSENTIAL HYPERTENSION: ICD-10-CM

## 2023-05-11 DIAGNOSIS — E55.9 VITAMIN D DEFICIENCY: ICD-10-CM

## 2023-05-11 DIAGNOSIS — E78.01 FAMILIAL HYPERCHOLESTEROLEMIA: ICD-10-CM

## 2023-05-11 DIAGNOSIS — E53.8 VITAMIN B 12 DEFICIENCY: ICD-10-CM

## 2023-05-11 DIAGNOSIS — Z00.00 ANNUAL PHYSICAL EXAM: Primary | ICD-10-CM

## 2023-05-12 ENCOUNTER — LAB (OUTPATIENT)
Dept: LAB | Facility: HOSPITAL | Age: 63
End: 2023-05-12
Payer: COMMERCIAL

## 2023-05-12 ENCOUNTER — TELEPHONE (OUTPATIENT)
Dept: CARDIOLOGY | Facility: CLINIC | Age: 63
End: 2023-05-12
Payer: COMMERCIAL

## 2023-05-12 DIAGNOSIS — E53.8 VITAMIN B 12 DEFICIENCY: ICD-10-CM

## 2023-05-12 DIAGNOSIS — D64.9 LOW HEMOGLOBIN: ICD-10-CM

## 2023-05-12 DIAGNOSIS — I10 ESSENTIAL HYPERTENSION: ICD-10-CM

## 2023-05-12 DIAGNOSIS — R53.83 OTHER FATIGUE: ICD-10-CM

## 2023-05-12 DIAGNOSIS — E55.9 VITAMIN D DEFICIENCY: ICD-10-CM

## 2023-05-12 DIAGNOSIS — Z00.00 ANNUAL PHYSICAL EXAM: ICD-10-CM

## 2023-05-12 DIAGNOSIS — E78.01 FAMILIAL HYPERCHOLESTEROLEMIA: ICD-10-CM

## 2023-05-12 DIAGNOSIS — I25.10 CORONARY ARTERIOSCLEROSIS: ICD-10-CM

## 2023-05-12 LAB
25(OH)D3 SERPL-MCNC: 22.2 NG/ML (ref 30–100)
ALBUMIN SERPL-MCNC: 3.7 G/DL (ref 3.5–5.2)
ALBUMIN/GLOB SERPL: 1.4 G/DL
ALP SERPL-CCNC: 75 U/L (ref 39–117)
ALT SERPL W P-5'-P-CCNC: 20 U/L (ref 1–33)
ANION GAP SERPL CALCULATED.3IONS-SCNC: 8.3 MMOL/L (ref 5–15)
AST SERPL-CCNC: 20 U/L (ref 1–32)
BASOPHILS # BLD AUTO: 0.05 10*3/MM3 (ref 0–0.2)
BASOPHILS NFR BLD AUTO: 0.7 % (ref 0–1.5)
BILIRUB SERPL-MCNC: 0.3 MG/DL (ref 0–1.2)
BUN SERPL-MCNC: 12 MG/DL (ref 8–23)
BUN/CREAT SERPL: 16.9 (ref 7–25)
CALCIUM SPEC-SCNC: 9.2 MG/DL (ref 8.6–10.5)
CHLORIDE SERPL-SCNC: 107 MMOL/L (ref 98–107)
CHOLEST SERPL-MCNC: 150 MG/DL (ref 0–200)
CO2 SERPL-SCNC: 27.7 MMOL/L (ref 22–29)
CREAT SERPL-MCNC: 0.71 MG/DL (ref 0.57–1)
DEPRECATED RDW RBC AUTO: 42.3 FL (ref 37–54)
EGFRCR SERPLBLD CKD-EPI 2021: 95.1 ML/MIN/1.73
EOSINOPHIL # BLD AUTO: 0.16 10*3/MM3 (ref 0–0.4)
EOSINOPHIL NFR BLD AUTO: 2.1 % (ref 0.3–6.2)
ERYTHROCYTE [DISTWIDTH] IN BLOOD BY AUTOMATED COUNT: 13 % (ref 12.3–15.4)
GLOBULIN UR ELPH-MCNC: 2.6 GM/DL
GLUCOSE SERPL-MCNC: 93 MG/DL (ref 65–99)
HBA1C MFR BLD: 5.7 % (ref 4.8–5.6)
HCT VFR BLD AUTO: 38.3 % (ref 34–46.6)
HDLC SERPL-MCNC: 58 MG/DL (ref 40–60)
HGB BLD-MCNC: 12.8 G/DL (ref 12–15.9)
IMM GRANULOCYTES # BLD AUTO: 0.02 10*3/MM3 (ref 0–0.05)
IMM GRANULOCYTES NFR BLD AUTO: 0.3 % (ref 0–0.5)
LDLC SERPL CALC-MCNC: 53 MG/DL (ref 0–100)
LDLC/HDLC SERPL: 0.73 {RATIO}
LYMPHOCYTES # BLD AUTO: 2.16 10*3/MM3 (ref 0.7–3.1)
LYMPHOCYTES NFR BLD AUTO: 28.6 % (ref 19.6–45.3)
MCH RBC QN AUTO: 30.3 PG (ref 26.6–33)
MCHC RBC AUTO-ENTMCNC: 33.4 G/DL (ref 31.5–35.7)
MCV RBC AUTO: 90.5 FL (ref 79–97)
MONOCYTES # BLD AUTO: 0.56 10*3/MM3 (ref 0.1–0.9)
MONOCYTES NFR BLD AUTO: 7.4 % (ref 5–12)
NEUTROPHILS NFR BLD AUTO: 4.59 10*3/MM3 (ref 1.7–7)
NEUTROPHILS NFR BLD AUTO: 60.9 % (ref 42.7–76)
NRBC BLD AUTO-RTO: 0 /100 WBC (ref 0–0.2)
PLATELET # BLD AUTO: 244 10*3/MM3 (ref 140–450)
PMV BLD AUTO: 10.4 FL (ref 6–12)
POTASSIUM SERPL-SCNC: 4.3 MMOL/L (ref 3.5–5.2)
PROT SERPL-MCNC: 6.3 G/DL (ref 6–8.5)
RBC # BLD AUTO: 4.23 10*6/MM3 (ref 3.77–5.28)
SODIUM SERPL-SCNC: 143 MMOL/L (ref 136–145)
TRIGL SERPL-MCNC: 248 MG/DL (ref 0–150)
TSH SERPL DL<=0.05 MIU/L-ACNC: 1.24 UIU/ML (ref 0.27–4.2)
VIT B12 BLD-MCNC: 550 PG/ML (ref 211–946)
VLDLC SERPL-MCNC: 39 MG/DL (ref 5–40)
WBC NRBC COR # BLD: 7.54 10*3/MM3 (ref 3.4–10.8)

## 2023-05-12 PROCEDURE — 83540 ASSAY OF IRON: CPT

## 2023-05-12 PROCEDURE — 82728 ASSAY OF FERRITIN: CPT

## 2023-05-12 PROCEDURE — 80050 GENERAL HEALTH PANEL: CPT

## 2023-05-12 PROCEDURE — 82306 VITAMIN D 25 HYDROXY: CPT

## 2023-05-12 PROCEDURE — 80061 LIPID PANEL: CPT

## 2023-05-12 PROCEDURE — 82607 VITAMIN B-12: CPT

## 2023-05-12 PROCEDURE — 83036 HEMOGLOBIN GLYCOSYLATED A1C: CPT

## 2023-05-12 PROCEDURE — 84466 ASSAY OF TRANSFERRIN: CPT

## 2023-05-12 PROCEDURE — 36415 COLL VENOUS BLD VENIPUNCTURE: CPT

## 2023-05-12 NOTE — TELEPHONE ENCOUNTER
Called pt she was having pressure to chest not pain but advised she needed to go to the ER, and dizziness. And wanted to see sooner than later. Message was sent to Dr Johnson for a response ----- Message from Nik Morales sent at 5/11/2023  8:28 AM CDT -----  Wants to move her July 7 apt up,  she stated she is having some issues and needs to be seen before that. Let me know when to move her thx

## 2023-05-15 ENCOUNTER — OFFICE VISIT (OUTPATIENT)
Dept: FAMILY MEDICINE CLINIC | Facility: CLINIC | Age: 63
End: 2023-05-15
Payer: COMMERCIAL

## 2023-05-15 ENCOUNTER — LAB (OUTPATIENT)
Dept: LAB | Facility: HOSPITAL | Age: 63
End: 2023-05-15
Payer: COMMERCIAL

## 2023-05-15 VITALS
TEMPERATURE: 98.6 F | DIASTOLIC BLOOD PRESSURE: 80 MMHG | HEIGHT: 61 IN | RESPIRATION RATE: 18 BRPM | SYSTOLIC BLOOD PRESSURE: 130 MMHG | BODY MASS INDEX: 35.04 KG/M2 | OXYGEN SATURATION: 98 % | HEART RATE: 58 BPM | WEIGHT: 185.6 LBS

## 2023-05-15 DIAGNOSIS — F41.1 GENERALIZED ANXIETY DISORDER: ICD-10-CM

## 2023-05-15 DIAGNOSIS — E78.2 MIXED HYPERLIPIDEMIA: ICD-10-CM

## 2023-05-15 DIAGNOSIS — F32.A DEPRESSIVE DISORDER: ICD-10-CM

## 2023-05-15 DIAGNOSIS — Z76.89 ENCOUNTER TO ESTABLISH CARE: Primary | ICD-10-CM

## 2023-05-15 DIAGNOSIS — I25.10 CORONARY ARTERY DISEASE INVOLVING NATIVE CORONARY ARTERY OF NATIVE HEART WITHOUT ANGINA PECTORIS: ICD-10-CM

## 2023-05-15 DIAGNOSIS — G43.009 MIGRAINE WITHOUT AURA AND WITHOUT STATUS MIGRAINOSUS, NOT INTRACTABLE: ICD-10-CM

## 2023-05-15 DIAGNOSIS — I10 ESSENTIAL HYPERTENSION: ICD-10-CM

## 2023-05-15 DIAGNOSIS — D64.9 LOW HEMOGLOBIN: ICD-10-CM

## 2023-05-15 DIAGNOSIS — R53.83 OTHER FATIGUE: ICD-10-CM

## 2023-05-15 DIAGNOSIS — R42 DIZZINESS: ICD-10-CM

## 2023-05-15 DIAGNOSIS — E66.01 MORBID OBESITY DUE TO EXCESS CALORIES: ICD-10-CM

## 2023-05-15 DIAGNOSIS — E55.9 VITAMIN D DEFICIENCY: ICD-10-CM

## 2023-05-15 DIAGNOSIS — Z90.3 S/P GASTRIC SLEEVE PROCEDURE: ICD-10-CM

## 2023-05-15 LAB
AMPHET+METHAMPHET UR QL: NEGATIVE
AMPHETAMINES UR QL: NEGATIVE
BACTERIA UR QL AUTO: ABNORMAL /HPF
BARBITURATES UR QL SCN: NEGATIVE
BENZODIAZ UR QL SCN: POSITIVE
BILIRUB UR QL STRIP: NEGATIVE
BUPRENORPHINE SERPL-MCNC: NEGATIVE NG/ML
CANNABINOIDS SERPL QL: NEGATIVE
CLARITY UR: ABNORMAL
COCAINE UR QL: NEGATIVE
COLOR UR: YELLOW
FENTANYL UR-MCNC: NEGATIVE NG/ML
FERRITIN SERPL-MCNC: 31.8 NG/ML (ref 13–150)
GLUCOSE UR STRIP-MCNC: NEGATIVE MG/DL
HGB UR QL STRIP.AUTO: NEGATIVE
HYALINE CASTS UR QL AUTO: ABNORMAL /LPF
IRON 24H UR-MRATE: 58 MCG/DL (ref 37–145)
IRON SATN MFR SERPL: 14 % (ref 20–50)
KETONES UR QL STRIP: NEGATIVE
LEUKOCYTE ESTERASE UR QL STRIP.AUTO: ABNORMAL
METHADONE UR QL SCN: NEGATIVE
NITRITE UR QL STRIP: POSITIVE
OPIATES UR QL: NEGATIVE
OXYCODONE UR QL SCN: NEGATIVE
PCP UR QL SCN: NEGATIVE
PH UR STRIP.AUTO: 6 [PH] (ref 5–9)
PROPOXYPH UR QL: NEGATIVE
PROT UR QL STRIP: NEGATIVE
RBC # UR STRIP: ABNORMAL /HPF
REF LAB TEST METHOD: ABNORMAL
SP GR UR STRIP: 1.02 (ref 1–1.03)
SQUAMOUS #/AREA URNS HPF: ABNORMAL /HPF
TIBC SERPL-MCNC: 405 MCG/DL (ref 298–536)
TRANSFERRIN SERPL-MCNC: 272 MG/DL (ref 200–360)
TRICYCLICS UR QL SCN: NEGATIVE
UROBILINOGEN UR QL STRIP: ABNORMAL
VIT B12 BLD-MCNC: 472 PG/ML (ref 211–946)
WBC # UR STRIP: ABNORMAL /HPF

## 2023-05-15 PROCEDURE — 81001 URINALYSIS AUTO W/SCOPE: CPT

## 2023-05-15 PROCEDURE — 82607 VITAMIN B-12: CPT

## 2023-05-15 PROCEDURE — 36415 COLL VENOUS BLD VENIPUNCTURE: CPT

## 2023-05-15 PROCEDURE — 86645 CMV ANTIBODY IGM: CPT

## 2023-05-15 PROCEDURE — 86665 EPSTEIN-BARR CAPSID VCA: CPT

## 2023-05-15 PROCEDURE — 86644 CMV ANTIBODY: CPT

## 2023-05-15 PROCEDURE — 80307 DRUG TEST PRSMV CHEM ANLYZR: CPT

## 2023-05-15 RX ORDER — ALPRAZOLAM 0.25 MG/1
0.25 TABLET ORAL 2 TIMES DAILY PRN
Qty: 10 TABLET | Refills: 0 | Status: SHIPPED | OUTPATIENT
Start: 2023-05-15

## 2023-05-15 RX ORDER — SEMAGLUTIDE 0.25 MG/.5ML
0.25 INJECTION, SOLUTION SUBCUTANEOUS WEEKLY
Qty: 2 ML | Refills: 0 | Status: SHIPPED | OUTPATIENT
Start: 2023-05-15

## 2023-05-15 RX ORDER — CEFUROXIME AXETIL 500 MG/1
500 TABLET ORAL 2 TIMES DAILY
Qty: 20 TABLET | Refills: 0 | Status: SHIPPED | OUTPATIENT
Start: 2023-05-15 | End: 2023-05-25

## 2023-05-15 RX ORDER — BUTALBITAL, ASPIRIN, AND CAFFEINE 325; 50; 40 MG/1; MG/1; MG/1
1 CAPSULE ORAL EVERY 4 HOURS PRN
Qty: 20 CAPSULE | Refills: 0 | Status: SHIPPED | OUTPATIENT
Start: 2023-05-15

## 2023-05-15 RX ORDER — ALPRAZOLAM 0.25 MG/1
0.25 TABLET ORAL 2 TIMES DAILY PRN
COMMUNITY
End: 2023-05-15 | Stop reason: SDUPTHER

## 2023-05-15 NOTE — PROGRESS NOTES
Subjective   Kimberly Measn is a 62 y.o. female.     History of Present Illness  CC: Establish care- hypertension, hyperlipidemia, CAD, vitamin D deficiency, obesity, SP gastric sleeve, anxiety, depression  Fatigue  This is a new problem. The current episode started 1 to 4 weeks ago. The problem occurs constantly. The problem has been unchanged. Associated symptoms include fatigue. Pertinent negatives include no abdominal pain, arthralgias, chest pain, chills, congestion, coughing, headaches, myalgias, nausea, rash, sore throat or vomiting. Nothing aggravates the symptoms. She has tried rest, sleep and drinking for the symptoms. The treatment provided no relief.   Dizziness  This is a new problem. The current episode started 1 to 4 weeks ago. The problem occurs intermittently. The problem has been waxing and waning. Associated symptoms include fatigue. Pertinent negatives include no abdominal pain, arthralgias, chest pain, chills, congestion, coughing, headaches, myalgias, nausea, rash, sore throat or vomiting. Nothing aggravates the symptoms. She has tried nothing for the symptoms. The treatment provided no relief.   Hypertension  This is a chronic problem. The current episode started more than 1 year ago. The problem is controlled. Associated symptoms include anxiety. Pertinent negatives include no chest pain, headaches, palpitations or shortness of breath. Risk factors for coronary artery disease include family history, dyslipidemia, obesity and sedentary lifestyle. Current antihypertension treatment includes beta blockers. The current treatment provides significant improvement. Compliance problems include exercise and diet.  There is no history of angina, kidney disease or CAD/MI.   Hyperlipidemia  This is a chronic problem. The current episode started more than 1 year ago. The problem is controlled. Recent lipid tests were reviewed and are variable. Exacerbating diseases include obesity. Factors aggravating  her hyperlipidemia include fatty foods and beta blockers. Pertinent negatives include no chest pain, focal sensory loss, focal weakness, leg pain, myalgias or shortness of breath. Current antihyperlipidemic treatment includes statins and ezetimibe. The current treatment provides significant improvement of lipids. Compliance problems include adherence to diet and adherence to exercise.  Risk factors for coronary artery disease include family history, dyslipidemia, hypertension, obesity, a sedentary lifestyle and post-menopausal.   Coronary Artery Disease  Presents for follow-up visit. Symptoms include dizziness. Pertinent negatives include no chest pain, chest tightness, leg swelling, palpitations, shortness of breath or weight gain. Risk factors include hyperlipidemia and obesity. Risk factors do not include stress. The symptoms have been stable. Compliance with diet is variable. Compliance with exercise is variable. Compliance with medications is good.   Obesity  This is a chronic problem. The current episode started more than 1 year ago. The problem occurs constantly. The problem has been unchanged. Associated symptoms include fatigue. Pertinent negatives include no abdominal pain, arthralgias, chest pain, chills, congestion, coughing, headaches, myalgias, nausea, rash, sore throat or vomiting. The symptoms are aggravated by drinking and eating. She has tried drinking and eating (Wegovy, SP gastric sleeve) for the symptoms. The treatment provided significant relief.   Anxiety  Presents for follow-up visit. Symptoms include dizziness. Patient reports no chest pain, depressed mood, excessive worry, insomnia, nausea, nervous/anxious behavior, palpitations, panic, restlessness, shortness of breath or suicidal ideas. Symptoms occur occasionally. The severity of symptoms is mild. The quality of sleep is fair. Nighttime awakenings: occasional.     Her past medical history is significant for depression. Compliance with  medications is %.   Depression  Visit Type: follow-up  Patient is not experiencing: anhedonia, depressed mood, excessive worry, feelings of hopelessness, feelings of worthlessness, insomnia, nervousness/anxiety, palpitations, panic, restlessness, shortness of breath, suicidal ideas, suicidal planning, thoughts of death, weight gain and weight loss.  Frequency of symptoms: occasionally   Severity: mild   Sleep quality: fair  Nighttime awakenings: occasional  Compliance with medications:  %             The following portions of the patient's history were reviewed and updated as appropriate: allergies, current medications, past family history, past medical history, past social history, past surgical history and problem list.    Review of Systems   Constitutional: Positive for fatigue. Negative for activity change, appetite change, chills, unexpected weight gain and unexpected weight loss.   HENT: Negative for congestion, sore throat, trouble swallowing and voice change.    Eyes: Negative.    Respiratory: Negative for cough, chest tightness, shortness of breath and wheezing.    Cardiovascular: Negative for chest pain, palpitations and leg swelling.   Gastrointestinal: Negative for abdominal pain, constipation, diarrhea, nausea and vomiting.   Endocrine: Negative.  Negative for cold intolerance, heat intolerance, polydipsia, polyphagia and polyuria.   Genitourinary: Negative for dysuria.   Musculoskeletal: Negative for arthralgias and myalgias.   Skin: Negative for rash.   Allergic/Immunologic: Negative.    Neurological: Positive for dizziness. Negative for focal weakness.   Hematological: Negative.    Psychiatric/Behavioral: Negative.  Negative for suicidal ideas, depressed mood and stress. The patient is not nervous/anxious and does not have insomnia.        Objective   Physical Exam  Vitals and nursing note reviewed.   Constitutional:       General: She is not in acute distress.     Appearance: Normal  appearance. She is well-developed. She is obese. She is not ill-appearing, toxic-appearing or diaphoretic.   HENT:      Head: Normocephalic and atraumatic.      Right Ear: External ear normal. No swelling or tenderness. A middle ear effusion is present. There is no impacted cerumen.      Left Ear: External ear normal. No swelling or tenderness. A middle ear effusion is present. There is no impacted cerumen.      Nose: Nose normal.   Eyes:      Conjunctiva/sclera: Conjunctivae normal.      Pupils: Pupils are equal, round, and reactive to light.   Neck:      Thyroid: No thyromegaly.      Vascular: No carotid bruit.      Trachea: No tracheal deviation.   Cardiovascular:      Rate and Rhythm: Normal rate and regular rhythm.      Heart sounds: Normal heart sounds. No murmur heard.    No friction rub. No gallop.   Pulmonary:      Effort: Pulmonary effort is normal. No respiratory distress.      Breath sounds: Normal breath sounds. No stridor. No wheezing, rhonchi or rales.   Abdominal:      General: Bowel sounds are normal. There is no distension.      Palpations: Abdomen is soft. There is no mass.      Tenderness: There is no abdominal tenderness. There is no guarding or rebound.      Hernia: No hernia is present.   Musculoskeletal:         General: No tenderness. Normal range of motion.      Cervical back: Normal range of motion and neck supple.   Lymphadenopathy:      Cervical: No cervical adenopathy.   Skin:     General: Skin is warm and dry.      Coloration: Skin is not pale.      Findings: No erythema or rash.   Neurological:      Mental Status: She is alert and oriented to person, place, and time.      Cranial Nerves: No cranial nerve deficit.      Coordination: Coordination normal.   Psychiatric:         Mood and Affect: Mood normal.         Behavior: Behavior normal.         Thought Content: Thought content normal.         Judgment: Judgment normal.           Assessment & Plan   Diagnoses and all orders for this  visit:    1. Encounter to establish care (Primary)    2. Essential hypertension   -Controlled.  Continue Toprol-XL as prescribed.  We will continue to monitor.    3. Mixed hyperlipidemia   - Lipid panel reviewed.  Continue low-fat diet and Vytorin as prescribed.  We will continue to monitor.    4. Coronary artery disease involving native coronary artery of native heart without angina pectoris  -     ECG 12 Lead; Future, will call with results.  Denies chest pain or shortness of breath.  Does report a 2-week history of fatigue and dizziness.  Patient has moved up cardiology appointment for sooner reevaluation.  Patient did have fluid behind her eardrums today which could explain her dizziness.  Patient no acute distress.  We will continue to monitor.  Present to the ER for new or worsening symptoms.    5. Vitamin D deficiency   -Vitamin D level reviewed.  Continue vitamin D as prescribed.    6. Morbid obesity due to excess calories  -     Semaglutide-Weight Management (Wegovy) 0.25 MG/0.5ML solution auto-injector; Inject 0.25 mg under the skin into the appropriate area as directed 1 (One) Time Per Week.  Dispense: 2 mL; Refill: 0   -Patient has regained weight since stopping Wegovy.  New prescription for Wegovy sent to pharmacy.  Patient aware of potential adverse side effects.  Recommended highly plant-based diet with lean meat protein choices and routine physical activity along with Wegovy.  Patient verbalized understanding of instruction.    7. S/P gastric sleeve procedure  -     Semaglutide-Weight Management (Wegovy) 0.25 MG/0.5ML solution auto-injector; Inject 0.25 mg under the skin into the appropriate area as directed 1 (One) Time Per Week.  Dispense: 2 mL; Refill: 0   - Plan of care stated above #6.  We will continue to monitor.    8. Depressive disorder   -Stable with no suicidal homicidal ideations.  Continue Lexapro as prescribed.  We will continue to monitor.    9. Generalized anxiety disorder  -      Urine Drug Screen - Urine, Clean Catch; Future  -     ALPRAZolam (XANAX) 0.25 MG tablet; Take 1 tablet by mouth 2 (Two) Times a Day As Needed for Anxiety.  Dispense: 10 tablet; Refill: 0   -Stable no suicidal homicidal ideations.  Continue Lexapro as prescribed.  Luis Felipe reviewed and appropriate.  Urine drug screen ordered.  Controlled substance contract signed.  Xanax refilled.  Patient reports needing Xanax very infrequently and thus only a 10 tablet supply was prescribed.  Patient verbalized understanding of instruction.  We will continue to monitor.      10. Other fatigue  -     Iron Profile; Future  -     Ferritin; Future  -     Occult Blood X 1, Stool - Stool, Per Rectum; Future  -     Urinalysis With Microscopic - Urine, Clean Catch; Future  -     ECG 12 Lead; Future  -     EBVCA(IgG / M); Future  -     Cytomegalovirus Antibody, IgG; Future  -     Cytomegalovirus Antibody, IgM; Future  -     Vitamin B12; Future, will call with results.  Patient has had 2 recent respiratory illnesses within the last month.  These have subsequently resolved but could also explain her fatigue.  We will call with results and further plan of care    11. Low hemoglobin  -     Iron Profile; Future  -     Ferritin; Future  -     Occult Blood X 1, Stool - Stool, Per Rectum; Future  -     Urinalysis With Microscopic - Urine, Clean Catch; Future  -     Vitamin B12; Future, will call with results.  Two-point drop in hemoglobin over the last 9 to 12 months.  We will continue to monitor.    12. Dizziness  -     ECG 12 Lead; Future, will call with results.  As noted above, patient does have bilateral serous otitis media likely secondary to her recent URIs.  We will continue to monitor.  Plan of care stated above in #4.    13. Migraine without aura and without status migrainosus, not intractable  -     Urine Drug Screen - Urine, Clean Catch; Future  -     butalbital-aspirin-caffeine (FIORINAL) -40 MG capsule; Take 1 capsule by mouth  Every 4 (Four) Hours As Needed for Headache.  Dispense: 20 capsule; Refill: 0   -Stable/controlled.  Controlled substance contract signed and urine drug screen ordered.  Luis Felipe reviewed and appropriate.  Fiorinal refilled.  Due to infrequent need/use 20 capsule supply prescribed.  Patient verbalized understanding of instruction.    14.  Follow-up in 6 months or sooner for any acute needs.  Follow-up and plan of care may change pending lab results and resolution of patient's acute symptoms.            This document has been electronically signed by NICOLLE Paris on May 15, 2023 10:01 CDT

## 2023-05-15 NOTE — PROGRESS NOTES
Bacteria noted on UA.  Ceftin 500 mg twice a day for 10 days sent to pharmacy.  Culture pending.  Other labs pending

## 2023-05-16 ENCOUNTER — LAB (OUTPATIENT)
Dept: LAB | Facility: HOSPITAL | Age: 63
End: 2023-05-16
Payer: COMMERCIAL

## 2023-05-16 DIAGNOSIS — R53.83 OTHER FATIGUE: ICD-10-CM

## 2023-05-16 DIAGNOSIS — D64.9 LOW HEMOGLOBIN: ICD-10-CM

## 2023-05-16 LAB
CMV IGG SERPL IA-ACNC: 9.5 U/ML (ref 0–0.59)
CMV IGM SERPL IA-ACNC: <30 AU/ML (ref 0–29.9)
EBV VCA IGG SER IA-ACNC: >600 U/ML (ref 0–17.9)
EBV VCA IGM SER IA-ACNC: <36 U/ML (ref 0–35.9)
HEMOCCULT STL QL: NEGATIVE

## 2023-05-16 PROCEDURE — 82272 OCCULT BLD FECES 1-3 TESTS: CPT

## 2023-05-16 NOTE — PROGRESS NOTES
Mono IgG also significantly elevated but acute markers are negative.  Follow-up if symptoms persist.

## 2023-05-16 NOTE — PROGRESS NOTES
Her CMV acute titer was negative but her IgG is significantly elevated so there is possibility that she had that infection recently.  This may explain her prolonged fatigue.  As such, time should resolve this.  Other labs still pending.

## 2023-05-17 ENCOUNTER — TELEPHONE (OUTPATIENT)
Dept: CARDIOLOGY | Facility: CLINIC | Age: 63
End: 2023-05-17
Payer: COMMERCIAL

## 2023-05-17 NOTE — TELEPHONE ENCOUNTER
Scheduled to see Cleopatra VALVERDE 5/19/23 at 8 am ----- Message from NICOLLE Bishop sent at 5/15/2023  5:09 PM CDT -----  You can put her in a 30 min spot this week if there is one.       ----- Message -----  From: Donna Lehman MA  Sent: 5/15/2023   9:00 AM CDT  To: NICOLLE Bishop, Catarina Bowers MA, #    Can one of yall see this patient ?   ----- Message -----  From: Nallely Foster MD  Sent: 5/12/2023   4:33 PM CDT  To: Donna Lehman MA    That is fine.  Please see if NICOLLE can see her.  ----- Message -----  From: Donna Lehman MA  Sent: 5/12/2023   4:17 PM CDT  To: Nallely Foster MD    Pt wants to be seen earlier having pressure and dizziness and is ok seeing a NP if needed. I advised if she's having chest pressure she needed to go to the ER and she agreed  ----- Message -----  From: Eneida Navarro RegSched Rep  Sent: 5/11/2023   8:30 AM CDT  To: Donna Lehman MA    Wants to move her July 7 apt up,  she stated she is having some issues and needs to be seen before that. Let me know when to move her thx

## 2023-05-18 LAB
QT INTERVAL: 432 MS
QTC INTERVAL: 442 MS

## 2023-05-30 ENCOUNTER — DOCUMENTATION (OUTPATIENT)
Dept: FAMILY MEDICINE CLINIC | Facility: CLINIC | Age: 63
End: 2023-05-30

## 2023-05-30 NOTE — PROGRESS NOTES
Prior authorization for WEGOVY was approved from 05/30/2023-12/20/2023. Patient/pharmacy notified.

## 2023-06-08 DIAGNOSIS — E66.01 MORBID OBESITY DUE TO EXCESS CALORIES: Primary | ICD-10-CM

## 2023-06-08 RX ORDER — PEN NEEDLE, DIABETIC 30 GX5/16"
1 NEEDLE, DISPOSABLE MISCELLANEOUS DAILY
Qty: 100 EACH | Refills: 11 | Status: SHIPPED | OUTPATIENT
Start: 2023-06-08

## 2023-06-09 ENCOUNTER — DOCUMENTATION (OUTPATIENT)
Dept: FAMILY MEDICINE CLINIC | Facility: CLINIC | Age: 63
End: 2023-06-09
Payer: COMMERCIAL

## 2023-06-09 ENCOUNTER — TELEPHONE (OUTPATIENT)
Dept: FAMILY MEDICINE CLINIC | Facility: CLINIC | Age: 63
End: 2023-06-09
Payer: COMMERCIAL

## 2023-06-09 DIAGNOSIS — E66.01 MORBID OBESITY DUE TO EXCESS CALORIES: ICD-10-CM

## 2023-06-09 NOTE — TELEPHONE ENCOUNTER
[facility] [id number] - [place] [address] AT [facility] &  [address] - [phone] PH - [phone] FX      Clarification needed on as usually increases to 3 mg as the maintenance and also says 5 ml and assumes meant to say 5 pens instead so needs corrected   Liraglutide (SAXENDA) 18 MG/3ML injection pen [[id number]] (Order [id number])        Please call [phone]  [facility]

## 2023-06-09 NOTE — PROGRESS NOTES
Prior authorization for [facility] was approved from 06/09/2023-10/09/2023. Patient/pharmacy notified

## 2023-06-12 ENCOUNTER — TELEPHONE (OUTPATIENT)
Dept: FAMILY MEDICINE CLINIC | Facility: CLINIC | Age: 63
End: 2023-06-12
Payer: COMMERCIAL

## 2023-06-13 NOTE — TELEPHONE ENCOUNTER
Please let the pharmacy know directions are okay.  After the 2.4 mg daily, he can increase to 3.0 with next prescription as long as patient is tolerating.  ThanksKARLEY

## 2023-07-26 RX ORDER — ESCITALOPRAM OXALATE 10 MG/1
10 TABLET ORAL NIGHTLY
Qty: 90 TABLET | Refills: 1 | Status: SHIPPED | OUTPATIENT
Start: 2023-07-26

## 2023-07-26 NOTE — TELEPHONE ENCOUNTER
Incoming Refill Request      Medication requested (name and dose): escitalopram (LEXAPRO) 10 MG tablet     Pharmacy where request should be sent: [facility]    Additional details provided by patient: BRET IS TRYING TO GET HER MEDICATIONS FILLED BEFORE THE 31ST. WOULD LIKE DAYA TO SEND THIS IN IF SHE IS TO STILL BE ON.     Best call back number: [phone]    Does the patient have less than a 3 day supply:  [x] Yes  [] No    Nik Stephenson Rep  07/26/23, 11:06 CDT

## 2023-08-21 DIAGNOSIS — E66.01 MORBID OBESITY DUE TO EXCESS CALORIES: ICD-10-CM

## 2023-09-06 DIAGNOSIS — F41.1 GENERALIZED ANXIETY DISORDER: ICD-10-CM

## 2023-09-06 DIAGNOSIS — E66.01 MORBID OBESITY DUE TO EXCESS CALORIES: ICD-10-CM

## 2023-09-06 DIAGNOSIS — G43.009 MIGRAINE WITHOUT AURA AND WITHOUT STATUS MIGRAINOSUS, NOT INTRACTABLE: ICD-10-CM

## 2023-09-06 RX ORDER — ALPRAZOLAM 0.25 MG/1
TABLET ORAL
Qty: 10 TABLET | OUTPATIENT
Start: 2023-09-06

## 2023-09-06 RX ORDER — CEFUROXIME AXETIL 500 MG/1
TABLET ORAL
Qty: 20 TABLET | Refills: 0 | OUTPATIENT
Start: 2023-09-06

## 2023-09-06 RX ORDER — BUTALBITAL, ASPIRIN, AND CAFFEINE 325; 50; 40 MG/1; MG/1; MG/1
CAPSULE ORAL
Qty: 20 CAPSULE | OUTPATIENT
Start: 2023-09-06

## 2023-09-06 RX ORDER — BUTALBITAL, ASPIRIN, AND CAFFEINE 325; 50; 40 MG/1; MG/1; MG/1
1 CAPSULE ORAL EVERY 4 HOURS PRN
Qty: 20 CAPSULE | Refills: 0 | Status: CANCELLED | OUTPATIENT
Start: 2023-09-06

## 2023-09-06 RX ORDER — ALPRAZOLAM 0.25 MG/1
0.25 TABLET ORAL 2 TIMES DAILY PRN
Qty: 10 TABLET | Refills: 0 | Status: CANCELLED | OUTPATIENT
Start: 2023-09-06

## 2023-09-07 ENCOUNTER — DOCUMENTATION (OUTPATIENT)
Dept: FAMILY MEDICINE CLINIC | Facility: CLINIC | Age: 63
End: 2023-09-07
Payer: MEDICARE

## 2023-09-07 ENCOUNTER — TELEPHONE (OUTPATIENT)
Dept: FAMILY MEDICINE CLINIC | Facility: CLINIC | Age: 63
End: 2023-09-07
Payer: MEDICARE

## 2023-09-07 DIAGNOSIS — E66.01 MORBID OBESITY DUE TO EXCESS CALORIES: Primary | ICD-10-CM

## 2023-09-07 NOTE — TELEPHONE ENCOUNTER
Kimberly called stating she went to get her Saxenda prescription and it was going to be $1400. She is wanting to know if there is something else that may be cheaper or if a prior authorization can be sent in for this.     Please call back @ [phone]

## (undated) DEVICE — CANN SMPL SOFTECH BIFLO ETCO2 A/M 7FT

## (undated) DEVICE — SINGLE-USE BIOPSY FORCEPS: Brand: RADIAL JAW 4

## (undated) DEVICE — BITEBLOCK ENDO W/STRAP 60F A/ LF DISP